# Patient Record
Sex: FEMALE | Race: WHITE | NOT HISPANIC OR LATINO | Employment: FULL TIME | URBAN - METROPOLITAN AREA
[De-identification: names, ages, dates, MRNs, and addresses within clinical notes are randomized per-mention and may not be internally consistent; named-entity substitution may affect disease eponyms.]

---

## 2017-02-24 PROBLEM — Z3A.39 39 WEEKS GESTATION OF PREGNANCY: Status: ACTIVE | Noted: 2017-02-24

## 2018-02-05 PROBLEM — Z3A.39 39 WEEKS GESTATION OF PREGNANCY: Status: RESOLVED | Noted: 2017-02-24 | Resolved: 2018-02-05

## 2018-04-20 PROBLEM — R10.2 PELVIC PAIN IN FEMALE: Status: ACTIVE | Noted: 2018-04-20

## 2018-06-05 PROBLEM — R06.4 HYPERVENTILATION: Status: ACTIVE | Noted: 2018-06-05

## 2018-10-05 ENCOUNTER — OFFICE VISIT (OUTPATIENT)
Dept: URGENT CARE | Facility: CLINIC | Age: 23
End: 2018-10-05
Payer: MEDICAID

## 2018-10-05 DIAGNOSIS — Z01.89 ENCOUNTER FOR LABORATORY TEST: Primary | ICD-10-CM

## 2018-10-05 PROCEDURE — 99000 SPECIMEN HANDLING OFFICE-LAB: CPT | Mod: S$GLB,,, | Performed by: PHYSICIAN ASSISTANT

## 2018-10-05 PROCEDURE — 36415 COLL VENOUS BLD VENIPUNCTURE: CPT | Mod: S$GLB,,, | Performed by: PHYSICIAN ASSISTANT

## 2018-10-05 PROCEDURE — 99204 OFFICE O/P NEW MOD 45 MIN: CPT | Mod: S$GLB,,, | Performed by: PHYSICIAN ASSISTANT

## 2018-10-05 NOTE — LETTER
October 5, 2018      [facility] [place]  [address], [address]  [place] [address]  Phone: [phone]  Fax: [phone]       Patient: Tess Rodríguez   YOB: 1995  Date of Visit: 10/05/2018    To Whom It May Concern:    Katelynn Rodríguez  was at [facility] on 10/05/2018. She may return to work/school on 10/05/2018 with no restrictions. If you have any questions or concerns, or if I can be of further assistance, please do not hesitate to contact me.    Sincerely,    RAFAT Osorio

## 2018-10-06 LAB — VZV IGG SER IA-ACNC: 1436 INDEX

## 2018-10-08 ENCOUNTER — TELEPHONE (OUTPATIENT)
Dept: URGENT CARE | Facility: CLINIC | Age: 23
End: 2018-10-08

## 2018-10-08 NOTE — TELEPHONE ENCOUNTER
Patient called about lab results from DOS 10/05.  Patient was given results, per Dr. Noonan, and stated will come and  a copy of results.

## 2020-01-30 PROBLEM — R10.13 ABDOMINAL PAIN, EPIGASTRIC: Status: ACTIVE | Noted: 2020-01-30

## 2020-04-19 PROBLEM — R10.31 RIGHT LOWER QUADRANT ABDOMINAL PAIN: Status: ACTIVE | Noted: 2020-04-19

## 2020-06-27 PROBLEM — R10.9 ABDOMINAL PAIN AFFECTING PREGNANCY: Status: ACTIVE | Noted: 2020-06-27

## 2020-06-27 PROBLEM — O26.899 ABDOMINAL PAIN AFFECTING PREGNANCY: Status: ACTIVE | Noted: 2020-06-27

## 2020-08-01 PROBLEM — O21.9 VOMITING AFFECTING PREGNANCY, ANTEPARTUM: Status: ACTIVE | Noted: 2020-08-01

## 2020-09-20 PROBLEM — O47.9 UTERINE CONTRACTIONS DURING PREGNANCY: Status: ACTIVE | Noted: 2020-09-20

## 2020-09-30 PROBLEM — Z3A.39 39 WEEKS GESTATION OF PREGNANCY: Status: ACTIVE | Noted: 2020-09-30

## 2021-07-27 PROBLEM — N94.4 PRIMARY DYSMENORRHEA: Status: ACTIVE | Noted: 2021-07-27

## 2021-09-13 PROBLEM — Z3A.39 39 WEEKS GESTATION OF PREGNANCY: Status: RESOLVED | Noted: 2020-09-30 | Resolved: 2021-09-13

## 2021-10-06 ENCOUNTER — OFFICE VISIT (OUTPATIENT)
Dept: URGENT CARE | Facility: CLINIC | Age: 26
End: 2021-10-06
Payer: MEDICAID

## 2021-10-06 VITALS
BODY MASS INDEX: 33.99 KG/M2 | DIASTOLIC BLOOD PRESSURE: 81 MMHG | OXYGEN SATURATION: 99 % | WEIGHT: 204 LBS | TEMPERATURE: 98 F | HEART RATE: 75 BPM | HEIGHT: 65 IN | SYSTOLIC BLOOD PRESSURE: 121 MMHG | RESPIRATION RATE: 16 BRPM

## 2021-10-06 DIAGNOSIS — Z11.59 SCREENING FOR VIRAL DISEASE: ICD-10-CM

## 2021-10-06 DIAGNOSIS — K52.9 GASTROENTERITIS: Primary | ICD-10-CM

## 2021-10-06 LAB
CTP QC/QA: YES
CTP QC/QA: YES
POC MOLECULAR INFLUENZA A AGN: NEGATIVE
POC MOLECULAR INFLUENZA B AGN: NEGATIVE
SARS-COV-2 RDRP RESP QL NAA+PROBE: NEGATIVE

## 2021-10-06 PROCEDURE — 87635 SARS-COV-2 COVID-19 AMP PRB: CPT | Mod: QW,S$GLB,, | Performed by: NURSE PRACTITIONER

## 2021-10-06 PROCEDURE — 87502 POCT INFLUENZA A/B MOLECULAR: ICD-10-PCS | Mod: QW,S$GLB,, | Performed by: NURSE PRACTITIONER

## 2021-10-06 PROCEDURE — 87502 INFLUENZA DNA AMP PROBE: CPT | Mod: QW,S$GLB,, | Performed by: NURSE PRACTITIONER

## 2021-10-06 PROCEDURE — 99214 OFFICE O/P EST MOD 30 MIN: CPT | Mod: S$GLB,CS,, | Performed by: NURSE PRACTITIONER

## 2021-10-06 PROCEDURE — 99214 PR OFFICE/OUTPT VISIT, EST, LEVL IV, 30-39 MIN: ICD-10-PCS | Mod: S$GLB,CS,, | Performed by: NURSE PRACTITIONER

## 2021-10-06 PROCEDURE — 87635: ICD-10-PCS | Mod: QW,S$GLB,, | Performed by: NURSE PRACTITIONER

## 2021-10-06 RX ORDER — DICYCLOMINE HYDROCHLORIDE 10 MG/1
10 CAPSULE ORAL 3 TIMES DAILY PRN
Qty: 12 CAPSULE | Refills: 0 | Status: SHIPPED | OUTPATIENT
Start: 2021-10-06 | End: 2021-12-13

## 2021-10-06 RX ORDER — ONDANSETRON 8 MG/1
8 TABLET, ORALLY DISINTEGRATING ORAL EVERY 8 HOURS PRN
Qty: 6 TABLET | Refills: 0 | Status: SHIPPED | OUTPATIENT
Start: 2021-10-06 | End: 2021-10-09

## 2021-10-06 RX ORDER — BUPROPION HYDROCHLORIDE 75 MG/1
75 TABLET ORAL 2 TIMES DAILY
COMMUNITY
End: 2021-12-13

## 2022-03-23 ENCOUNTER — OFFICE VISIT (OUTPATIENT)
Dept: URGENT CARE | Facility: CLINIC | Age: 27
End: 2022-03-23
Payer: MEDICAID

## 2022-03-23 VITALS
OXYGEN SATURATION: 98 % | BODY MASS INDEX: 32.15 KG/M2 | DIASTOLIC BLOOD PRESSURE: 87 MMHG | WEIGHT: 193 LBS | SYSTOLIC BLOOD PRESSURE: 124 MMHG | HEIGHT: 65 IN | TEMPERATURE: 98 F | HEART RATE: 100 BPM

## 2022-03-23 DIAGNOSIS — K52.9 GASTROENTERITIS: Primary | ICD-10-CM

## 2022-03-23 DIAGNOSIS — Z11.59 SCREENING FOR VIRAL DISEASE: ICD-10-CM

## 2022-03-23 DIAGNOSIS — R11.2 NAUSEA AND VOMITING, INTRACTABILITY OF VOMITING NOT SPECIFIED, UNSPECIFIED VOMITING TYPE: ICD-10-CM

## 2022-03-23 LAB
CTP QC/QA: YES
POC MOLECULAR INFLUENZA A AGN: NEGATIVE
POC MOLECULAR INFLUENZA B AGN: NEGATIVE

## 2022-03-23 PROCEDURE — 3008F PR BODY MASS INDEX (BMI) DOCUMENTED: ICD-10-PCS | Mod: CPTII,S$GLB,, | Performed by: NURSE PRACTITIONER

## 2022-03-23 PROCEDURE — 3074F PR MOST RECENT SYSTOLIC BLOOD PRESSURE < 130 MM HG: ICD-10-PCS | Mod: CPTII,S$GLB,, | Performed by: NURSE PRACTITIONER

## 2022-03-23 PROCEDURE — S0119 ONDANSETRON 4 MG: HCPCS | Mod: S$GLB,,, | Performed by: NURSE PRACTITIONER

## 2022-03-23 PROCEDURE — 1160F PR REVIEW ALL MEDS BY PRESCRIBER/CLIN PHARMACIST DOCUMENTED: ICD-10-PCS | Mod: CPTII,S$GLB,, | Performed by: NURSE PRACTITIONER

## 2022-03-23 PROCEDURE — 87502 INFLUENZA DNA AMP PROBE: CPT | Mod: QW,S$GLB,, | Performed by: NURSE PRACTITIONER

## 2022-03-23 PROCEDURE — 87502 POCT INFLUENZA A/B MOLECULAR: ICD-10-PCS | Mod: QW,S$GLB,, | Performed by: NURSE PRACTITIONER

## 2022-03-23 PROCEDURE — 3079F PR MOST RECENT DIASTOLIC BLOOD PRESSURE 80-89 MM HG: ICD-10-PCS | Mod: CPTII,S$GLB,, | Performed by: NURSE PRACTITIONER

## 2022-03-23 PROCEDURE — S0119 PR ONDANSETRON, ORAL, 4MG: ICD-10-PCS | Mod: S$GLB,,, | Performed by: NURSE PRACTITIONER

## 2022-03-23 PROCEDURE — 3079F DIAST BP 80-89 MM HG: CPT | Mod: CPTII,S$GLB,, | Performed by: NURSE PRACTITIONER

## 2022-03-23 PROCEDURE — 99214 OFFICE O/P EST MOD 30 MIN: CPT | Mod: S$GLB,,, | Performed by: NURSE PRACTITIONER

## 2022-03-23 PROCEDURE — 3074F SYST BP LT 130 MM HG: CPT | Mod: CPTII,S$GLB,, | Performed by: NURSE PRACTITIONER

## 2022-03-23 PROCEDURE — 1159F PR MEDICATION LIST DOCUMENTED IN MEDICAL RECORD: ICD-10-PCS | Mod: CPTII,S$GLB,, | Performed by: NURSE PRACTITIONER

## 2022-03-23 PROCEDURE — 1160F RVW MEDS BY RX/DR IN RCRD: CPT | Mod: CPTII,S$GLB,, | Performed by: NURSE PRACTITIONER

## 2022-03-23 PROCEDURE — 3008F BODY MASS INDEX DOCD: CPT | Mod: CPTII,S$GLB,, | Performed by: NURSE PRACTITIONER

## 2022-03-23 PROCEDURE — 1159F MED LIST DOCD IN RCRD: CPT | Mod: CPTII,S$GLB,, | Performed by: NURSE PRACTITIONER

## 2022-03-23 PROCEDURE — 99214 PR OFFICE/OUTPT VISIT, EST, LEVL IV, 30-39 MIN: ICD-10-PCS | Mod: S$GLB,,, | Performed by: NURSE PRACTITIONER

## 2022-03-23 RX ORDER — ONDANSETRON 8 MG/1
8 TABLET, ORALLY DISINTEGRATING ORAL
Status: COMPLETED | OUTPATIENT
Start: 2022-03-23 | End: 2022-03-23

## 2022-03-23 RX ORDER — DICYCLOMINE HYDROCHLORIDE 10 MG/1
10 CAPSULE ORAL
Qty: 16 CAPSULE | Refills: 0 | Status: SHIPPED | OUTPATIENT
Start: 2022-03-23 | End: 2022-03-27

## 2022-03-23 RX ORDER — ONDANSETRON 8 MG/1
8 TABLET, ORALLY DISINTEGRATING ORAL EVERY 8 HOURS PRN
Qty: 6 TABLET | Refills: 0 | Status: SHIPPED | OUTPATIENT
Start: 2022-03-23 | End: 2022-03-26

## 2022-03-23 RX ADMIN — ONDANSETRON 8 MG: 8 TABLET, ORALLY DISINTEGRATING ORAL at 04:03

## 2022-03-23 NOTE — LETTER
March 23, 2022      [facility]  [address], [address]  [place] [address]  Phone: [phone]  Fax: [phone]       Patient: Tess Rodríguez   YOB: 1995  Date of Visit: 03/23/2022    To Whom It May Concern:    Katelynn Rodríguez  was at [facility] on 03/23/2022. She may return to work/school on 03/25/2022 with no restrictions. If you have any questions or concerns, or if I can be of further assistance, please do not hesitate to contact me.    Sincerely,      Christine Magallon, NP

## 2022-03-23 NOTE — PATIENT INSTRUCTIONS
.1.  Take all medications as directed (only as needed for your symptoms).  2.  Rest and keep yourself/patient well hydrated. Start with small sips every 15 minutes and increase as tolerated. Use Gatorade/Pedialyte/Coconut water or rehydration packets to help stay hydrated.  Vitamin water and plain water do not contain rehydrating electrolytes.  Hold off on solids for 12-18 hours then advance to a BRAT/bland diet for the next several days. Increase as tolerated. Avoid all spicy, greasy, and acidic foods as these will make your symptoms worse. If you are unable to tolerate anything by mouth even after taking prescription meds and following the above instructions, you will likely need to go to the ER for IV fluids.  3. Perform good handwashing and Clorox/Lysol all surfaces well (especially bathrooms) to prevent spread of infection.   4.  For patients above 6 months of age who are not allergic to and are not on anticoagulants, you can alternate Tylenol and Motrin every 4-6 hours for fever above 100.4F and/or pain.  For patients less than 6 months of age, allergic to or intolerant to NSAIDS, have gastritis, gastric ulcers, or history of GI bleeds, are pregnant, or are on anticoagulant therapy, you can take Tylenol every 4 hours as needed for fever above 100.4F and/or pain.   5. You should schedule a follow-up appointment with your Primary Care Provider/Pediatrician for recheck in 2-3 days or as directed at this visit.   6.  If your condition fails to improve in a timely manner, you should receive another evaluation by your Primary Care Provider/Pediatrician to discuss your concerns or return to urgent care for a recheck.  If your condition worsens at any time, you should report immediately to your nearest Emergency Department for further evaluation. **You must understand that you have received Urgent Care treatment only and that you may be released before all of your medical problems are known or treated. You, the  patient, are responsible to arrange for follow-up care as instructed.

## 2022-03-23 NOTE — PROGRESS NOTES
"Subjective:       Patient ID: Tess Rodríguez is a 27 y.o. female.    Vitals:  height is 5' 5" (1.651 m) and weight is 87.5 kg (193 lb). Her tympanic temperature is 98.3 °F (36.8 °C). Her blood pressure is 124/87 and her pulse is 100. Her oxygen saturation is 98%.     Chief Complaint: Emesis (Patient stated she came back from lunch and started feeling bad.)    Pt reports symptoms started today after eating [employer] for lunch. She also states her daughter vomited this morning. She request a flu swab.     Emesis   This is a new problem. The current episode started today. The problem occurs 5 to 10 times per day. The problem has been gradually worsening. The emesis has an appearance of bile and stomach contents. There has been no fever. Associated symptoms include abdominal pain and diarrhea. She has tried nothing for the symptoms. The treatment provided no relief.       Constitution: Negative.   Neck: neck negative.   Cardiovascular: Negative.    Respiratory: Negative.    Gastrointestinal: Positive for abdominal pain, vomiting and diarrhea. Negative for abdominal trauma, abdominal bloating, history of abdominal surgery, nausea and constipation.       Objective:      Physical Exam   Constitutional: She is oriented to person, place, and time. She appears well-developed.  Non-toxic appearance. She does not appear ill. No distress.   HENT:   Head: Normocephalic and atraumatic.   Ears:   Right Ear: External ear normal.   Left Ear: External ear normal.   Nose: Nose normal.   Mouth/Throat: Mucous membranes are normal.   Eyes: Conjunctivae and lids are normal.   Neck: Trachea normal. Neck supple.   Cardiovascular: Normal rate, regular rhythm and normal heart sounds.   Pulmonary/Chest: Effort normal and breath sounds normal. No respiratory distress.   Abdominal: Normal appearance and bowel sounds are normal. She exhibits no distension, no abdominal bruit, no pulsatile midline mass and no mass. Soft. There is no abdominal " tenderness. There is no rebound, no guarding, no left CVA tenderness and no right CVA tenderness.   Musculoskeletal: Normal range of motion.         General: Normal range of motion.   Neurological: She is alert and oriented to person, place, and time. She has normal strength.   Skin: Skin is warm, dry, intact, not diaphoretic and not pale.   Psychiatric: Her speech is normal and behavior is normal. Judgment and thought content normal.   Nursing note and vitals reviewed.        Assessment:       1. Gastroenteritis    2. Nausea and vomiting, intractability of vomiting not specified, unspecified vomiting type    3. Screening for viral disease          Plan:         Gastroenteritis  -     dicyclomine (BENTYL) 10 MG capsule; Take 1 capsule (10 mg total) by mouth 4 (four) times daily before meals and nightly. for 4 days  Dispense: 16 capsule; Refill: 0  -     ondansetron (ZOFRAN-ODT) 8 MG TbDL; Take 1 tablet (8 mg total) by mouth every 8 (eight) hours as needed (nausea and vomiting).  Dispense: 6 tablet; Refill: 0    Nausea and vomiting, intractability of vomiting not specified, unspecified vomiting type  -     ondansetron disintegrating tablet 8 mg  -     dicyclomine (BENTYL) 10 MG capsule; Take 1 capsule (10 mg total) by mouth 4 (four) times daily before meals and nightly. for 4 days  Dispense: 16 capsule; Refill: 0  -     ondansetron (ZOFRAN-ODT) 8 MG TbDL; Take 1 tablet (8 mg total) by mouth every 8 (eight) hours as needed (nausea and vomiting).  Dispense: 6 tablet; Refill: 0    Screening for viral disease  -     POCT Influenza A/B MOLECULAR      Results for orders placed or performed in visit on 03/23/22   POCT Influenza A/B MOLECULAR   Result Value Ref Range    POC Molecular Influenza A Ag Negative Negative, Not Reported    POC Molecular Influenza B Ag Negative Negative, Not Reported     Acceptable Yes            Patient Instructions   .1.  Take all medications as directed (only as needed for your  symptoms).  2.  Rest and keep yourself/patient well hydrated. Start with small sips every 15 minutes and increase as tolerated. Use Gatorade/Pedialyte/Coconut water or rehydration packets to help stay hydrated.  Vitamin water and plain water do not contain rehydrating electrolytes.  Hold off on solids for 12-18 hours then advance to a BRAT/bland diet for the next several days. Increase as tolerated. Avoid all spicy, greasy, and acidic foods as these will make your symptoms worse. If you are unable to tolerate anything by mouth even after taking prescription meds and following the above instructions, you will likely need to go to the ER for IV fluids.  3. Perform good handwashing and Clorox/Lysol all surfaces well (especially bathrooms) to prevent spread of infection.   4.  For patients above 6 months of age who are not allergic to and are not on anticoagulants, you can alternate Tylenol and Motrin every 4-6 hours for fever above 100.4F and/or pain.  For patients less than 6 months of age, allergic to or intolerant to NSAIDS, have gastritis, gastric ulcers, or history of GI bleeds, are pregnant, or are on anticoagulant therapy, you can take Tylenol every 4 hours as needed for fever above 100.4F and/or pain.   5. You should schedule a follow-up appointment with your Primary Care Provider/Pediatrician for recheck in 2-3 days or as directed at this visit.   6.  If your condition fails to improve in a timely manner, you should receive another evaluation by your Primary Care Provider/Pediatrician to discuss your concerns or return to urgent care for a recheck.  If your condition worsens at any time, you should report immediately to your nearest Emergency Department for further evaluation. **You must understand that you have received Urgent Care treatment only and that you may be released before all of your medical problems are known or treated. You, the patient, are responsible to arrange for follow-up care as instructed.

## 2022-11-04 ENCOUNTER — TELEPHONE (OUTPATIENT)
Dept: GASTROENTEROLOGY | Facility: CLINIC | Age: 27
End: 2022-11-04
Payer: MEDICAID

## 2022-12-28 ENCOUNTER — TELEPHONE (OUTPATIENT)
Dept: GASTROENTEROLOGY | Facility: CLINIC | Age: 27
End: 2022-12-28
Payer: MEDICAID

## 2022-12-28 NOTE — TELEPHONE ENCOUNTER
Spoke with staff of Dr Delatorre's office explain that dr tamayo  is doesn't see pt for Gastris pacemakers or jejunostomy. It would best to refer pt to Dr Durán in general surgery.

## 2022-12-29 ENCOUNTER — TELEPHONE (OUTPATIENT)
Dept: GASTROENTEROLOGY | Facility: CLINIC | Age: 27
End: 2022-12-29

## 2022-12-29 NOTE — TELEPHONE ENCOUNTER
----- Message from Sandra Velez sent at 12/29/2022  2:03 PM CST -----  Regarding: Referral  Good Evening,      Dr. Rene would like to refer the following patient to  in the Gastro department. The patients diagnosis is possible pacemaker vs jejunotomy. I have scanned the patients referral and records into .     Thank you,   Sandra Rivera

## 2023-01-17 ENCOUNTER — OFFICE VISIT (OUTPATIENT)
Dept: URGENT CARE | Facility: CLINIC | Age: 28
End: 2023-01-17
Payer: MEDICAID

## 2023-01-17 VITALS
OXYGEN SATURATION: 98 % | HEIGHT: 65 IN | WEIGHT: 168 LBS | SYSTOLIC BLOOD PRESSURE: 99 MMHG | BODY MASS INDEX: 27.99 KG/M2 | HEART RATE: 59 BPM | DIASTOLIC BLOOD PRESSURE: 67 MMHG | TEMPERATURE: 98 F | RESPIRATION RATE: 18 BRPM

## 2023-01-17 DIAGNOSIS — R68.89 FLU-LIKE SYMPTOMS: Primary | ICD-10-CM

## 2023-01-17 DIAGNOSIS — J01.00 ACUTE NON-RECURRENT MAXILLARY SINUSITIS: ICD-10-CM

## 2023-01-17 LAB
CTP QC/QA: YES
POC MOLECULAR INFLUENZA A AGN: NEGATIVE
POC MOLECULAR INFLUENZA B AGN: NEGATIVE

## 2023-01-17 PROCEDURE — 99214 OFFICE O/P EST MOD 30 MIN: CPT | Mod: S$GLB,,, | Performed by: PHYSICIAN ASSISTANT

## 2023-01-17 PROCEDURE — 3078F DIAST BP <80 MM HG: CPT | Mod: CPTII,S$GLB,, | Performed by: PHYSICIAN ASSISTANT

## 2023-01-17 PROCEDURE — 1160F RVW MEDS BY RX/DR IN RCRD: CPT | Mod: CPTII,S$GLB,, | Performed by: PHYSICIAN ASSISTANT

## 2023-01-17 PROCEDURE — 1159F PR MEDICATION LIST DOCUMENTED IN MEDICAL RECORD: ICD-10-PCS | Mod: CPTII,S$GLB,, | Performed by: PHYSICIAN ASSISTANT

## 2023-01-17 PROCEDURE — 1160F PR REVIEW ALL MEDS BY PRESCRIBER/CLIN PHARMACIST DOCUMENTED: ICD-10-PCS | Mod: CPTII,S$GLB,, | Performed by: PHYSICIAN ASSISTANT

## 2023-01-17 PROCEDURE — 3074F PR MOST RECENT SYSTOLIC BLOOD PRESSURE < 130 MM HG: ICD-10-PCS | Mod: CPTII,S$GLB,, | Performed by: PHYSICIAN ASSISTANT

## 2023-01-17 PROCEDURE — 99214 PR OFFICE/OUTPT VISIT, EST, LEVL IV, 30-39 MIN: ICD-10-PCS | Mod: S$GLB,,, | Performed by: PHYSICIAN ASSISTANT

## 2023-01-17 PROCEDURE — 3008F BODY MASS INDEX DOCD: CPT | Mod: CPTII,S$GLB,, | Performed by: PHYSICIAN ASSISTANT

## 2023-01-17 PROCEDURE — 87502 INFLUENZA DNA AMP PROBE: CPT | Mod: QW,S$GLB,, | Performed by: PHYSICIAN ASSISTANT

## 2023-01-17 PROCEDURE — 3078F PR MOST RECENT DIASTOLIC BLOOD PRESSURE < 80 MM HG: ICD-10-PCS | Mod: CPTII,S$GLB,, | Performed by: PHYSICIAN ASSISTANT

## 2023-01-17 PROCEDURE — 87502 POCT INFLUENZA A/B MOLECULAR: ICD-10-PCS | Mod: QW,S$GLB,, | Performed by: PHYSICIAN ASSISTANT

## 2023-01-17 PROCEDURE — 1159F MED LIST DOCD IN RCRD: CPT | Mod: CPTII,S$GLB,, | Performed by: PHYSICIAN ASSISTANT

## 2023-01-17 PROCEDURE — 3008F PR BODY MASS INDEX (BMI) DOCUMENTED: ICD-10-PCS | Mod: CPTII,S$GLB,, | Performed by: PHYSICIAN ASSISTANT

## 2023-01-17 PROCEDURE — 3074F SYST BP LT 130 MM HG: CPT | Mod: CPTII,S$GLB,, | Performed by: PHYSICIAN ASSISTANT

## 2023-01-17 RX ORDER — FLUTICASONE PROPIONATE 50 MCG
1 SPRAY, SUSPENSION (ML) NASAL 2 TIMES DAILY PRN
Qty: 15 G | Refills: 0 | Status: SHIPPED | OUTPATIENT
Start: 2023-01-17

## 2023-01-17 RX ORDER — CETIRIZINE HYDROCHLORIDE 10 MG/1
10 TABLET ORAL DAILY
Qty: 30 TABLET | Refills: 0 | Status: SHIPPED | OUTPATIENT
Start: 2023-01-17 | End: 2024-01-17

## 2023-01-17 RX ORDER — DEXTROMETHORPHAN HYDROBROMIDE, GUAIFENESIN, PHENYLEPHRINE HYDROCHLORIDE 17.5; 400; 1 MG/1; MG/1; MG/1
1 TABLET ORAL
Qty: 20 TABLET | Refills: 0 | Status: ON HOLD | OUTPATIENT
Start: 2023-01-17 | End: 2023-05-19

## 2023-01-17 RX ORDER — AMOXICILLIN AND CLAVULANATE POTASSIUM 875; 125 MG/1; MG/1
1 TABLET, FILM COATED ORAL EVERY 12 HOURS
Qty: 14 TABLET | Refills: 0 | Status: SHIPPED | OUTPATIENT
Start: 2023-01-17 | End: 2023-01-24

## 2023-01-17 NOTE — PROGRESS NOTES
"Subjective:       Patient ID: Tess Rodríguez is a 27 y.o. female.    Vitals:  height is 5' 5" (1.651 m) and weight is 76.2 kg (168 lb). Her oral temperature is 97.7 °F (36.5 °C). Her blood pressure is 99/67 and her pulse is 59 (abnormal). Her respiration is 18 and oxygen saturation is 98%.     Chief Complaint: Sore Throat    Sore Throat   This is a new problem. The current episode started in the past 7 days. The problem has been gradually worsening. There has been no fever. Associated symptoms include congestion, ear pain, headaches, swollen glands and trouble swallowing. Pertinent negatives include no coughing or diarrhea. She has had no exposure to mono. She has tried acetaminophen (chloriseptic) for the symptoms.     HENT:  Positive for ear pain, congestion, sore throat and trouble swallowing.    Respiratory:  Negative for cough.    Gastrointestinal:  Negative for diarrhea.   Neurological:  Positive for headaches.     Objective:      Physical Exam   Constitutional: She is oriented to person, place, and time. She appears well-developed. She is cooperative.  Non-toxic appearance. She does not appear ill. No distress.   HENT:   Head: Normocephalic and atraumatic.   Ears:   Right Ear: Hearing, external ear and ear canal normal. Tympanic membrane is not erythematous, not retracted and not bulging. No middle ear effusion. impacted cerumen  Left Ear: Hearing, external ear and ear canal normal. Tympanic membrane is bulging. Tympanic membrane is not erythematous and not retracted. A middle ear effusion is present. impacted cerumen  Nose: No rhinorrhea or congestion. Right sinus exhibits maxillary sinus tenderness. Right sinus exhibits no frontal sinus tenderness. Left sinus exhibits maxillary sinus tenderness. Left sinus exhibits no frontal sinus tenderness.   Mouth/Throat: Mucous membranes are moist. No oropharyngeal exudate or posterior oropharyngeal erythema. Oropharynx is clear.   Dull left TM      Comments: Dull " left TM  Eyes: Conjunctivae and lids are normal. No scleral icterus.   Neck: Phonation normal. Neck supple.   Cardiovascular: Normal rate, regular rhythm and normal heart sounds.   No murmur heard.Exam reveals no gallop and no friction rub.   Pulmonary/Chest: Effort normal and breath sounds normal. No stridor. No respiratory distress. She has no decreased breath sounds. She has no wheezes. She has no rhonchi. She has no rales.   Abdominal: Normal appearance.   Neurological: She is alert and oriented to person, place, and time. Coordination normal.   Skin: Skin is intact, not diaphoretic and not pale.   Psychiatric: Her speech is normal and behavior is normal. Judgment and thought content normal.   Nursing note and vitals reviewed.      Assessment:       1. Flu-like symptoms    2. Acute non-recurrent maxillary sinusitis          Plan:         Flu-like symptoms  -     POCT Influenza A/B MOLECULAR    Acute non-recurrent maxillary sinusitis  -     fluticasone propionate (FLONASE) 50 mcg/actuation nasal spray; 1 spray (50 mcg total) by Each Nostril route 2 (two) times daily as needed.  Dispense: 15 g; Refill: 0  -     phenylephrine-DM-guaiFENesin (DECONEX DMX) 10-17.5-400 mg Tab; Take 1 tablet by mouth every 4 to 6 hours as needed (congestion/cough).  Dispense: 20 tablet; Refill: 0  -     cetirizine (ZYRTEC) 10 MG tablet; Take 1 tablet (10 mg total) by mouth once daily.  Dispense: 30 tablet; Refill: 0  -     amoxicillin-clavulanate 875-125mg (AUGMENTIN) 875-125 mg per tablet; Take 1 tablet by mouth every 12 (twelve) hours. for 7 days  Dispense: 14 tablet; Refill: 0      Results for orders placed or performed in visit on 01/17/23   POCT Influenza A/B MOLECULAR   Result Value Ref Range    POC Molecular Influenza A Ag Negative Negative, Not Reported    POC Molecular Influenza B Ag Negative Negative, Not Reported     Acceptable Yes           I have reviewed the patients previous visits, labs and images to look  for any trends or previous treatments.  Alternate ibuprofen and tylenol as needed for fever/pain/body aches every 4-6 hours. Rest, increase PO fluids.   Discussed with patient the importance of f/u with their primary care provider. Urged to go to the ER for any worsening signs or symptoms.

## 2023-02-07 ENCOUNTER — TELEPHONE (OUTPATIENT)
Dept: GASTROENTEROLOGY | Facility: CLINIC | Age: 28
End: 2023-02-07
Payer: MEDICAID

## 2023-02-07 ENCOUNTER — HOSPITAL ENCOUNTER (EMERGENCY)
Facility: HOSPITAL | Age: 28
Discharge: HOME | End: 2023-02-07
Attending: EMERGENCY MEDICINE
Payer: MEDICAID

## 2023-02-07 VITALS
BODY MASS INDEX: 25.49 KG/M2 | TEMPERATURE: 99 F | OXYGEN SATURATION: 98 % | WEIGHT: 153 LBS | HEART RATE: 86 BPM | HEIGHT: 65 IN | RESPIRATION RATE: 18 BRPM | SYSTOLIC BLOOD PRESSURE: 118 MMHG | DIASTOLIC BLOOD PRESSURE: 78 MMHG

## 2023-02-07 DIAGNOSIS — K31.84 GASTROPARESIS: Primary | ICD-10-CM

## 2023-02-07 LAB
ALBUMIN SERPL BCP-MCNC: 4.5 G/DL (ref 3.5–5.2)
ALP SERPL-CCNC: 44 U/L (ref 55–135)
ALT SERPL W/O P-5'-P-CCNC: 9 U/L (ref 10–44)
ANION GAP SERPL CALC-SCNC: 9 MMOL/L (ref 8–16)
AST SERPL-CCNC: 13 U/L (ref 10–40)
BASOPHILS # BLD AUTO: 0.02 K/UL (ref 0–0.2)
BASOPHILS NFR BLD: 0.4 % (ref 0–1.9)
BILIRUB SERPL-MCNC: 0.5 MG/DL (ref 0.1–1)
BILIRUB UR QL STRIP: NEGATIVE
BUN SERPL-MCNC: 7 MG/DL (ref 6–30)
BUN SERPL-MCNC: 8 MG/DL (ref 6–20)
CALCIUM SERPL-MCNC: 9.1 MG/DL (ref 8.7–10.5)
CHLORIDE SERPL-SCNC: 106 MMOL/L (ref 95–110)
CHLORIDE SERPL-SCNC: 110 MMOL/L (ref 95–110)
CLARITY UR REFRACT.AUTO: CLEAR
CO2 SERPL-SCNC: 22 MMOL/L (ref 23–29)
COLOR UR AUTO: YELLOW
CREAT SERPL-MCNC: 0.6 MG/DL (ref 0.5–1.4)
CREAT SERPL-MCNC: 0.8 MG/DL (ref 0.5–1.4)
DIFFERENTIAL METHOD: NORMAL
EOSINOPHIL # BLD AUTO: 0.1 K/UL (ref 0–0.5)
EOSINOPHIL NFR BLD: 0.9 % (ref 0–8)
ERYTHROCYTE [DISTWIDTH] IN BLOOD BY AUTOMATED COUNT: 13.2 % (ref 11.5–14.5)
EST. GFR  (NO RACE VARIABLE): >60 ML/MIN/1.73 M^2
GLUCOSE SERPL-MCNC: 94 MG/DL (ref 70–110)
GLUCOSE SERPL-MCNC: 95 MG/DL (ref 70–110)
GLUCOSE UR QL STRIP: NEGATIVE
HCT VFR BLD AUTO: 37.8 % (ref 37–48.5)
HCT VFR BLD CALC: 40 %PCV (ref 36–54)
HCV AB SERPL QL IA: NORMAL
HGB BLD-MCNC: 12.8 G/DL (ref 12–16)
HGB UR QL STRIP: NEGATIVE
HIV 1+2 AB+HIV1 P24 AG SERPL QL IA: NORMAL
IMM GRANULOCYTES # BLD AUTO: 0.01 K/UL (ref 0–0.04)
IMM GRANULOCYTES NFR BLD AUTO: 0.2 % (ref 0–0.5)
KETONES UR QL STRIP: NEGATIVE
LEUKOCYTE ESTERASE UR QL STRIP: NEGATIVE
LIPASE SERPL-CCNC: 14 U/L (ref 4–60)
LYMPHOCYTES # BLD AUTO: 1.2 K/UL (ref 1–4.8)
LYMPHOCYTES NFR BLD: 20.8 % (ref 18–48)
MAGNESIUM SERPL-MCNC: 2.1 MG/DL (ref 1.6–2.6)
MCH RBC QN AUTO: 30.4 PG (ref 27–31)
MCHC RBC AUTO-ENTMCNC: 33.9 G/DL (ref 32–36)
MCV RBC AUTO: 90 FL (ref 82–98)
MONOCYTES # BLD AUTO: 0.3 K/UL (ref 0.3–1)
MONOCYTES NFR BLD: 6.1 % (ref 4–15)
NEUTROPHILS # BLD AUTO: 4 K/UL (ref 1.8–7.7)
NEUTROPHILS NFR BLD: 71.6 % (ref 38–73)
NITRITE UR QL STRIP: NEGATIVE
NRBC BLD-RTO: 0 /100 WBC
PH UR STRIP: 7 [PH] (ref 5–8)
PLATELET # BLD AUTO: 194 K/UL (ref 150–450)
PMV BLD AUTO: 11.2 FL (ref 9.2–12.9)
POC IONIZED CALCIUM: 1.13 MMOL/L (ref 1.06–1.42)
POC TCO2 (MEASURED): 24 MMOL/L (ref 23–29)
POTASSIUM BLD-SCNC: 3.5 MMOL/L (ref 3.5–5.1)
POTASSIUM SERPL-SCNC: 3.5 MMOL/L (ref 3.5–5.1)
PROT SERPL-MCNC: 7.5 G/DL (ref 6–8.4)
PROT UR QL STRIP: NEGATIVE
RBC # BLD AUTO: 4.21 M/UL (ref 4–5.4)
SAMPLE: NORMAL
SODIUM BLD-SCNC: 142 MMOL/L (ref 136–145)
SODIUM SERPL-SCNC: 141 MMOL/L (ref 136–145)
SP GR UR STRIP: 1.01 (ref 1–1.03)
URN SPEC COLLECT METH UR: NORMAL
WBC # BLD AUTO: 5.57 K/UL (ref 3.9–12.7)

## 2023-02-07 PROCEDURE — 81003 URINALYSIS AUTO W/O SCOPE: CPT | Performed by: PHYSICIAN ASSISTANT

## 2023-02-07 PROCEDURE — 87389 HIV-1 AG W/HIV-1&-2 AB AG IA: CPT | Performed by: PHYSICIAN ASSISTANT

## 2023-02-07 PROCEDURE — 80053 COMPREHEN METABOLIC PANEL: CPT | Performed by: PHYSICIAN ASSISTANT

## 2023-02-07 PROCEDURE — 63600175 PHARM REV CODE 636 W HCPCS: Performed by: PHYSICIAN ASSISTANT

## 2023-02-07 PROCEDURE — 99284 PR EMERGENCY DEPT VISIT,LEVEL IV: ICD-10-PCS | Mod: ,,, | Performed by: PHYSICIAN ASSISTANT

## 2023-02-07 PROCEDURE — 83690 ASSAY OF LIPASE: CPT | Performed by: PHYSICIAN ASSISTANT

## 2023-02-07 PROCEDURE — 96374 THER/PROPH/DIAG INJ IV PUSH: CPT

## 2023-02-07 PROCEDURE — 99284 EMERGENCY DEPT VISIT MOD MDM: CPT | Mod: 25

## 2023-02-07 PROCEDURE — 86803 HEPATITIS C AB TEST: CPT | Performed by: PHYSICIAN ASSISTANT

## 2023-02-07 PROCEDURE — 80047 BASIC METABLC PNL IONIZED CA: CPT

## 2023-02-07 PROCEDURE — 83735 ASSAY OF MAGNESIUM: CPT | Performed by: PHYSICIAN ASSISTANT

## 2023-02-07 PROCEDURE — 96375 TX/PRO/DX INJ NEW DRUG ADDON: CPT

## 2023-02-07 PROCEDURE — 85025 COMPLETE CBC W/AUTO DIFF WBC: CPT | Performed by: PHYSICIAN ASSISTANT

## 2023-02-07 PROCEDURE — 99284 EMERGENCY DEPT VISIT MOD MDM: CPT | Mod: ,,, | Performed by: PHYSICIAN ASSISTANT

## 2023-02-07 RX ORDER — ONDANSETRON 2 MG/ML
4 INJECTION INTRAMUSCULAR; INTRAVENOUS
Status: COMPLETED | OUTPATIENT
Start: 2023-02-07 | End: 2023-02-07

## 2023-02-07 RX ORDER — DROPERIDOL 2.5 MG/ML
1.25 INJECTION, SOLUTION INTRAMUSCULAR; INTRAVENOUS ONCE
Status: COMPLETED | OUTPATIENT
Start: 2023-02-07 | End: 2023-02-07

## 2023-02-07 RX ORDER — METOCLOPRAMIDE 10 MG/1
10 TABLET ORAL EVERY 6 HOURS PRN
Qty: 12 TABLET | Refills: 0 | Status: SHIPPED | OUTPATIENT
Start: 2023-02-07

## 2023-02-07 RX ADMIN — DROPERIDOL 1.25 MG: 2.5 INJECTION, SOLUTION INTRAMUSCULAR; INTRAVENOUS at 12:02

## 2023-02-07 RX ADMIN — ONDANSETRON 4 MG: 2 INJECTION INTRAMUSCULAR; INTRAVENOUS at 11:02

## 2023-02-07 RX ADMIN — SODIUM CHLORIDE, POTASSIUM CHLORIDE, SODIUM LACTATE AND CALCIUM CHLORIDE 1000 ML: 600; 310; 30; 20 INJECTION, SOLUTION INTRAVENOUS at 12:02

## 2023-02-07 NOTE — ED TRIAGE NOTES
"Pt c/o "issues with gastroparesis".  Pt states she is having difficulty swallowing, sensation of something stuck in throat and vomiting.  Pt trying to see GI doctor but unable to get another referreal to new doctor here.   "

## 2023-02-07 NOTE — TELEPHONE ENCOUNTER
----- Message from Claudia Ochoa sent at 2/7/2023 12:08 PM CST -----  HILLLISA OBIE calling regarding Patient Advice (message) for #missed call from nurse but no name. call back [phone]

## 2023-02-07 NOTE — FIRST PROVIDER EVALUATION
" Emergency Department TeleTriage Encounter Note      CHIEF COMPLAINT    Chief Complaint   Patient presents with    Emesis     Hx gastroparesis, been vomiting since June, trouble swallowing throat burning       VITAL SIGNS   Initial Vitals [02/07/23 1014]   BP Pulse Resp Temp SpO2   (!) 144/109 110 18 98.6 °F (37 °C) 100 %      MAP       --            ALLERGIES    Review of patient's allergies indicates:   Allergen Reactions    Wellbutrin [bupropion hcl] Other (See Comments)     Suicidal thoughts       PROVIDER TRIAGE NOTE  Patient presents with complaint of nausea and vomiting for six days. She reports no change in bowel movements. She reports no fever. She denied urinary symptoms. States it is her "gastroparesis".       Phy:   Constitutional: well nourished, well developed, appearing stated age, NAD   HEENT: NCAT, symmetrical lids, No obvious facial deformity.  Normal phonation. Normal Conjunctiva   Neck: NAROM   Respiratory: Normal effort.  No obvious use of accessory muscles   Musculoskeletal: Moved upper extremities well   Neuro: Alert, answers questions appropriately    Psych: appropriate mood and affect      Initial orders will be placed and care will be transferred to an alternate provider when patient is roomed for a full evaluation. Any additional orders and the final disposition will be determined by that provider.        ORDERS  Labs Reviewed   HIV 1 / 2 ANTIBODY   HEPATITIS C ANTIBODY   CBC W/ AUTO DIFFERENTIAL   COMPREHENSIVE METABOLIC PANEL   LIPASE   URINALYSIS, REFLEX TO URINE CULTURE   MAGNESIUM   ISTAT CHEM8       ED Orders (720h ago, onward)      Start Ordered     Status Ordering Provider    02/07/23 1145 02/07/23 1139  sodium chloride 0.9% bolus 1,000 mL 1,000 mL  Once         Ordered MARIA EUGENIA MATHIAS    02/07/23 1145 02/07/23 1139  ondansetron injection 4 mg  ED 1 Time         Ordered MARIA EUGENIA MATHIAS    02/07/23 1141 02/07/23 1140  Magnesium  STAT         Ordered JUSTIN " ANDRY, MARIA EUGENIA    02/07/23 1140 02/07/23 1139  Vital signs  Every 2 hours         Ordered JUSTIN WILDE, MARIA EUGENIA    02/07/23 1140 02/07/23 1139  Diet NPO  Diet effective now         Ordered JUSTIN WILDE, MARIA EUGENIA    02/07/23 1140 02/07/23 1139  Insert peripheral IV  Once         Ordered JUSTIN WILDE, MARIA EUGENIA    02/07/23 1140 02/07/23 1139  CBC W/ AUTO DIFFERENTIAL  STAT         Ordered JUTSIN WILDE, MARIA EUGENIA    02/07/23 1140 02/07/23 1139  Comp. Metabolic Panel  STAT         Ordered JUSTIN WILDE, MARIA EUGENIA    02/07/23 1140 02/07/23 1139  ISTAT CHEM8  Once         Ordered JUSTIN WILDE, MARIA EUGENIA    02/07/23 1140 02/07/23 1139  Lipase  STAT         Ordered JUSTIN WILDE, MARIA EUGENIA    02/07/23 1140 02/07/23 1139  Urinalysis, Reflex to Urine Culture Urine, Clean Catch  STAT         Ordered MARIA EUGENIA MATHIAS    02/07/23 1016 02/07/23 1015  HIV 1/2 Ag/Ab (4th Gen)  STAT         Acknowledged VIRGINIA FLORES    02/07/23 1016 02/07/23 1015  Hepatitis C Antibody  STAT         Acknowledged VIRGINIA FLORES              Virtual Visit Note: The provider triage portion of this emergency department evaluation and documentation was performed via [employer], a HIPAA-compliant telemedicine application, in concert with a tele-presenter in the room. A face to face patient evaluation with one of my colleagues will occur once the patient is placed in an emergency department room.      DISCLAIMER: This note was prepared with [employer]stagg voice recognition transcription software. Garbled syntax, mangled pronouns, and other bizarre constructions may be attributed to that software system.

## 2023-02-07 NOTE — ED PROVIDER NOTES
Encounter Date: 2/7/2023       History     Chief Complaint   Patient presents with    Emesis     Hx gastroparesis, been vomiting since June, trouble swallowing throat burning     Patient is a 28-year-old female with a history of gastroparesis, endometriosis, anxiety who [place] [facility] [place] with abdominal pain, nausea, vomiting.    On chart review, patient has had intermittent abdominal pain since 2018.  She has had several CTs of her abdomen that never show any acute processes.  She recently had recent fluoroscopic upper GI test that showed:  Signs of delayed gastric emptying consistent with gastroparesis which she has already been diagnosed with.    Patient [place] [facility] for chronic symptoms and referral.  She states that she was told by her previous providers that she may need a feeding tube as well as gastric stimulator.    She endorses generalized abdominal pain with nausea, vomiting.  Her bowel movements have been normal.  She has several antiemetics at home that has not helped her.  She feels as if she is losing weight.  States sometimes she can only drank half of a water bottle.  She occasionally feels burning in her throat.    This is the extent of her medical complaints at this time.    She smokes marijuana but states that she started having intermittent abdominal pain prior to her THC use.      Review of patient's allergies indicates:   Allergen Reactions    Wellbutrin [bupropion hcl] Other (See Comments)     Suicidal thoughts     Past Medical History:   Diagnosis Date    Allergic sinusitis     Anxiety     Digestive disorder     ACID REFLUX    Dyspareunia due to medical condition in female     Endometriosis     Pelvic pain in female     Postpartum depression     Syncope     CHRONIC/RECURRING    UTI (urinary tract infection)     H/O     Past Surgical History:   Procedure Laterality Date    COLONOSCOPY N/A 1/30/2020    Procedure: COLONOSCOPY;  Surgeon: Jonathon Chisholm MD;  Location: [facility];   Service: Endoscopy;  Laterality: N/A;    epidural for childbirth      ESOPHAGOGASTRODUODENOSCOPY N/A 2020    Procedure: ESOPHAGOGASTRODUODENOSCOPY (EGD);  Surgeon: Jonathon Chisholm MD;  Location: [facility] ENDO;  Service: Endoscopy;  Laterality: N/A;    HYSTERECTOMY      IUD placement and removal      LAPAROSCOPIC CHOLECYSTECTOMY N/A 2021    Procedure: CHOLECYSTECTOMY, LAPAROSCOPIC;  Surgeon: Phillip Nguyen MD;  Location: [facility] OR;  Service: General;  Laterality: N/A;    LAPAROSCOPIC SALPINGECTOMY Bilateral 2021    Procedure: SALPINGECTOMY, LAPAROSCOPIC, bilateral;  Surgeon: Lev Giles Jr., MD;  Location: [facility];  Service: OB/GYN;  Laterality: Bilateral;    LAPAROSCOPIC TOTAL HYSTERECTOMY N/A 2021    Procedure: HYSTERECTOMY, TOTAL, LAPAROSCOPIC;  Surgeon: Lev Giles Jr., MD;  Location: [facility] OR;  Service: OB/GYN;  Laterality: N/A;  11:30 per Omari, negative     PELVIC LAPAROSCOPY      POSTPARTUM LIGATION OF FALLOPIAN TUBE Bilateral 2020    Procedure: LIGATION, FALLOPIAN TUBE, POSTPARTUM;  Surgeon: Lev Giles Jr., MD;  Location: [facility] OR;  Service: OB/GYN;  Laterality: Bilateral;    WISDOM TOOTH EXTRACTION       Family History   Problem Relation Age of Onset    Birth defects Sister     Diabetes Maternal Grandmother     Diabetes Paternal Grandfather     Stomach cancer Paternal Grandfather     No Known Problems Mother      Social History     Tobacco Use    Smoking status: Former     Types: Cigarettes     Quit date:      Years since quittin.1    Smokeless tobacco: Never   Substance Use Topics    Alcohol use: Not Currently     Comment: occasionally    Drug use: No     Review of Systems   Constitutional:  Positive for activity change, appetite change and unexpected weight change. Negative for chills and fever.   HENT:  Positive for sore throat.    Respiratory:  Negative for shortness of breath.    Cardiovascular:  Negative for chest pain.    Gastrointestinal:  Positive for abdominal pain, nausea and vomiting. Negative for constipation and diarrhea.   Genitourinary:  Negative for dysuria.   Musculoskeletal:  Negative for back pain.   Skin:  Negative for rash.   Neurological:  Negative for weakness.   Hematological:  Does not bruise/bleed easily.     Physical Exam     Initial Vitals [02/07/23 1014]   BP Pulse Resp Temp SpO2   (!) 144/109 110 18 98.6 °F (37 °C) 100 %      MAP       --         Physical Exam    Vitals reviewed.  Constitutional: She appears well-developed and well-nourished. She is not diaphoretic. She is cooperative.  Non-toxic appearance. She does not have a sickly appearance. She does not appear ill. No distress.   HENT:   Head: Normocephalic and atraumatic.   Nose: Nose normal.   Mouth/Throat: No trismus in the jaw.   Eyes: Conjunctivae and EOM are normal.   Neck:   Normal range of motion.  Pulmonary/Chest: No accessory muscle usage. No tachypnea. No respiratory distress.   Abdominal: Abdomen is soft. She exhibits no distension. There is no abdominal tenderness. There is no rebound and no guarding.   Musculoskeletal:         General: Normal range of motion.      Cervical back: Normal range of motion.     Neurological: She is alert. She has normal strength.   Skin: Skin is warm and dry. No erythema. No pallor.       ED Course   Procedures  Labs Reviewed   COMPREHENSIVE METABOLIC PANEL - Abnormal; Notable for the following components:       Result Value    CO2 22 (*)     Alkaline Phosphatase 44 (*)     ALT 9 (*)     All other components within normal limits   HIV 1 / 2 ANTIBODY    Narrative:     Release to patient->Immediate   HEPATITIS C ANTIBODY    Narrative:     Release to patient->Immediate   CBC W/ AUTO DIFFERENTIAL   LIPASE   URINALYSIS, REFLEX TO URINE CULTURE    Narrative:     Specimen Source->Urine   MAGNESIUM   ISTAT PROCEDURE          Imaging Results    None          Medications   ondansetron injection 4 mg (4 mg Intravenous  Given 2/7/23 1150)   droperidoL injection 1.25 mg (1.25 mg Intravenous Given 2/7/23 1244)   lactated ringers bolus 1,000 mL (1,000 mLs Intravenous New Bag 2/7/23 1242)     Medical Decision Making:   History:   Old Medical Records: I decided to obtain old medical records.  Old Records Summarized: records from another hospital and records from previous admission(s).  Initial Assessment:   Patient is a 28-year-old female with a history of gastroparesis, endometriosis, anxiety who [place] [facility] [place], nausea, vomiting.  Differential Diagnosis:   Includes but is not limited to gastroparesis, cyclical vomiting syndrome, hyperemesis cannabis syndrome, electrolyte abnormalities, dehydration, SHAUNA.  Patient presenting with chronic symptoms.  Her abdomen is soft, nontender nondistended.  Have considered but have a low suspicion for acute surgical abdomen, however will keep on differential if she does not improve.  Clinical Tests:   Lab Tests: Reviewed and Ordered  Radiological Study: Reviewed  ED Management:  Will initiate workup, give IV fluids and IV antiemetics (droperidol) for acute symptoms, and reassess.    CBC without leukocytosis or anemia.  CMP without significant electrolyte abnormalities.  No SHAUNA.  No signs of liver failure common bile duct obstruction, or pancreatitis.    Patient reassessed.  She reports significant improvement in her symptoms.  She was p.o. challenged successfully.  I have placed a referral to gastroenterology here for a gastric stimulator evaluation (she has made an appt at [facility]).  I do not feel that she needs a feeding tube at this time given that she is still able to tolerate orally and has not lost significant weight nor is malnourished.  Follow up accordingly.  All of her questions were answered.  Return to ED precautions were given Patient comfortable with plan and stable for discharge.                        Clinical Impression:   Final diagnoses:  [K31.84]  Gastroparesis (Primary)        ED Disposition Condition    Discharge Stable          ED Prescriptions       Medication Sig Dispense Start Date End Date Auth. Provider    metoclopramide HCl (REGLAN) 10 MG tablet Take 1 tablet (10 mg total) by mouth every 6 (six) hours as needed. 12 tablet 2/7/2023 -- Kathe Campbell PA-C          Follow-up Information       Follow up With Specialties Details Why Contact Info Additional Information    [facility] - [facility] 4th Fl Gastroenterology Schedule an appointment as soon as possible for a visit   [address] [facility] [id number] [phone] GI Center & Urology - Main Building, 4th Floor Please park in [facility] and take Atrium elevator    [facility] - Emergency Dept Emergency Medicine  If symptoms worsen [address] [facility] [id number] [phone]           Future Appointments   Date Time Provider Department Center   3/2/2023  8:00 AM SYBIL Chairez [facility] Leslie Campbell PA-C  02/08/23 1944

## 2023-02-07 NOTE — ED NOTES
I-STAT Chem-8+ Results:   Value Reference Range   Sodium 142 136-145 mmol/L   Potassium  3.5 3.5-5.1 mmol/L   Chloride 106  mmol/L   Ionized Calcium 1.13 1.06-1.42 mmol/L   CO2 (measured) 24 23-29 mmol/L   Glucose 95  mg/dL   BUN 7 6-30 mg/dL   Creatinine 0.6 0.5-1.4 mg/dL   Hematocrit 40 36-54%

## 2023-02-07 NOTE — DISCHARGE INSTRUCTIONS
Your labs are normal.  No signs of electrolyte abnormalities, dehydration, kidney injury, or liver failure.  Your pancreatic enzyme is normal.  He did not have signs of blood in her urine or urinary tract infection.  Take zofran or regland for your nausea every 6 hours as needed.   Start soft/fluid diet (bland foods) and advance to solids as tolerated.   Stay hydrated by drinking plenty of fluids (2 liters for females and 3 liters for males on a daily basis).  Follow up with primary care physician.   Return to the ER for new or worsening symptoms.

## 2023-02-28 ENCOUNTER — TELEPHONE (OUTPATIENT)
Dept: GASTROENTEROLOGY | Facility: CLINIC | Age: 28
End: 2023-02-28
Payer: MEDICAID

## 2023-02-28 NOTE — TELEPHONE ENCOUNTER
Called patient to inform her that we will have to cancel her appointment scheduled 03/02/2023 because the provider does not treat patient with gastroparesis.    ----- Message from SYBIL Chairez sent at 2/28/2023  8:45 AM CST -----  Patient needs to follow up with previous GI provider or [facility] as I do not manage gastroparesis.

## 2023-03-03 ENCOUNTER — TELEPHONE (OUTPATIENT)
Dept: SURGERY | Facility: CLINIC | Age: 28
End: 2023-03-03
Payer: MEDICAID

## 2023-03-06 ENCOUNTER — OFFICE VISIT (OUTPATIENT)
Dept: SURGERY | Facility: CLINIC | Age: 28
End: 2023-03-06
Payer: MEDICAID

## 2023-03-06 VITALS
WEIGHT: 151.06 LBS | OXYGEN SATURATION: 99 % | SYSTOLIC BLOOD PRESSURE: 126 MMHG | HEART RATE: 90 BPM | DIASTOLIC BLOOD PRESSURE: 87 MMHG | HEIGHT: 65 IN | BODY MASS INDEX: 25.17 KG/M2

## 2023-03-06 DIAGNOSIS — K31.84 GASTROPARESIS: ICD-10-CM

## 2023-03-06 PROCEDURE — 99202 OFFICE O/P NEW SF 15 MIN: CPT | Mod: S$PBB,,, | Performed by: SURGERY

## 2023-03-06 PROCEDURE — 99999 PR PBB SHADOW E&M-EST. PATIENT-LVL IV: ICD-10-PCS | Mod: PBBFAC,,, | Performed by: SURGERY

## 2023-03-06 PROCEDURE — 3079F PR MOST RECENT DIASTOLIC BLOOD PRESSURE 80-89 MM HG: ICD-10-PCS | Mod: CPTII,,, | Performed by: SURGERY

## 2023-03-06 PROCEDURE — 3074F SYST BP LT 130 MM HG: CPT | Mod: CPTII,,, | Performed by: SURGERY

## 2023-03-06 PROCEDURE — 99214 OFFICE O/P EST MOD 30 MIN: CPT | Mod: PBBFAC | Performed by: SURGERY

## 2023-03-06 PROCEDURE — 3008F BODY MASS INDEX DOCD: CPT | Mod: CPTII,,, | Performed by: SURGERY

## 2023-03-06 PROCEDURE — 1159F PR MEDICATION LIST DOCUMENTED IN MEDICAL RECORD: ICD-10-PCS | Mod: CPTII,,, | Performed by: SURGERY

## 2023-03-06 PROCEDURE — 3074F PR MOST RECENT SYSTOLIC BLOOD PRESSURE < 130 MM HG: ICD-10-PCS | Mod: CPTII,,, | Performed by: SURGERY

## 2023-03-06 PROCEDURE — 3079F DIAST BP 80-89 MM HG: CPT | Mod: CPTII,,, | Performed by: SURGERY

## 2023-03-06 PROCEDURE — 3008F PR BODY MASS INDEX (BMI) DOCUMENTED: ICD-10-PCS | Mod: CPTII,,, | Performed by: SURGERY

## 2023-03-06 PROCEDURE — 1159F MED LIST DOCD IN RCRD: CPT | Mod: CPTII,,, | Performed by: SURGERY

## 2023-03-06 PROCEDURE — 99999 PR PBB SHADOW E&M-EST. PATIENT-LVL IV: CPT | Mod: PBBFAC,,, | Performed by: SURGERY

## 2023-03-06 PROCEDURE — 99202 PR OFFICE/OUTPT VISIT, NEW, LEVL II, 15-29 MIN: ICD-10-PCS | Mod: S$PBB,,, | Performed by: SURGERY

## 2023-03-06 NOTE — PROGRESS NOTES
29 y/o woman with history of gastroparesis.      Referral was placed for Dr. Durán per note 3/2/2023    I do not place gastric pacemakers.    Another referral placed.

## 2023-03-16 ENCOUNTER — OFFICE VISIT (OUTPATIENT)
Dept: SURGERY | Facility: CLINIC | Age: 28
End: 2023-03-16
Payer: MEDICAID

## 2023-03-16 VITALS
BODY MASS INDEX: 23.41 KG/M2 | WEIGHT: 140.5 LBS | HEART RATE: 62 BPM | SYSTOLIC BLOOD PRESSURE: 132 MMHG | DIASTOLIC BLOOD PRESSURE: 74 MMHG | HEIGHT: 65 IN

## 2023-03-16 DIAGNOSIS — K31.84 GASTROPARESIS: ICD-10-CM

## 2023-03-16 PROCEDURE — 3008F PR BODY MASS INDEX (BMI) DOCUMENTED: ICD-10-PCS | Mod: CPTII,,, | Performed by: SURGERY

## 2023-03-16 PROCEDURE — 99214 OFFICE O/P EST MOD 30 MIN: CPT | Mod: S$PBB,,, | Performed by: SURGERY

## 2023-03-16 PROCEDURE — 1159F PR MEDICATION LIST DOCUMENTED IN MEDICAL RECORD: ICD-10-PCS | Mod: CPTII,,, | Performed by: SURGERY

## 2023-03-16 PROCEDURE — 3078F PR MOST RECENT DIASTOLIC BLOOD PRESSURE < 80 MM HG: ICD-10-PCS | Mod: CPTII,,, | Performed by: SURGERY

## 2023-03-16 PROCEDURE — 99999 PR PBB SHADOW E&M-EST. PATIENT-LVL III: ICD-10-PCS | Mod: PBBFAC,,, | Performed by: SURGERY

## 2023-03-16 PROCEDURE — 3008F BODY MASS INDEX DOCD: CPT | Mod: CPTII,,, | Performed by: SURGERY

## 2023-03-16 PROCEDURE — 1159F MED LIST DOCD IN RCRD: CPT | Mod: CPTII,,, | Performed by: SURGERY

## 2023-03-16 PROCEDURE — 99213 OFFICE O/P EST LOW 20 MIN: CPT | Mod: PBBFAC | Performed by: SURGERY

## 2023-03-16 PROCEDURE — 3075F SYST BP GE 130 - 139MM HG: CPT | Mod: CPTII,,, | Performed by: SURGERY

## 2023-03-16 PROCEDURE — 99999 PR PBB SHADOW E&M-EST. PATIENT-LVL III: CPT | Mod: PBBFAC,,, | Performed by: SURGERY

## 2023-03-16 PROCEDURE — 3075F PR MOST RECENT SYSTOLIC BLOOD PRESS GE 130-139MM HG: ICD-10-PCS | Mod: CPTII,,, | Performed by: SURGERY

## 2023-03-16 PROCEDURE — 99214 PR OFFICE/OUTPT VISIT, EST, LEVL IV, 30-39 MIN: ICD-10-PCS | Mod: S$PBB,,, | Performed by: SURGERY

## 2023-03-16 PROCEDURE — 3078F DIAST BP <80 MM HG: CPT | Mod: CPTII,,, | Performed by: SURGERY

## 2023-03-16 PROCEDURE — 1160F RVW MEDS BY RX/DR IN RCRD: CPT | Mod: CPTII,,, | Performed by: SURGERY

## 2023-03-16 PROCEDURE — 1160F PR REVIEW ALL MEDS BY PRESCRIBER/CLIN PHARMACIST DOCUMENTED: ICD-10-PCS | Mod: CPTII,,, | Performed by: SURGERY

## 2023-03-16 NOTE — PROGRESS NOTES
I have seen the patient, reviewed the Student's history and physical, assessment and plan. I have personally interviewed and examined the patient at bedside and: agree with the findings.     27y/o with gerd and gastroparesis.  She has had symptoms for 3 years and they are worsening.  She has severe to extremely severe symptoms.  She has tried many medications including reglan without relief.  She says thc helps.  She also has dysphagia and it feels like it is in the upper esophagus.  She has lost 60 pounds in the last year and her weight is still not stable.  She has been to the ER about 5 times in the last year.     shows several narcotics rx in 2021 but none since.    She is not able to work due to symptoms.    Cbc, cmp basically ok  Ugi with small bowel 2022 reviewed, films viewed    -Delayed gastric emptying consistent with the clinical diagnosis of gastro paresis.    -Normal appearing small bowel.    -Questionable area in the cervical esophagus.  Would consider endoscopy.  Ct 2022 reviewed, films viewed    -No abnormality.    -Stomach appears ok  Ges 2022 reviewed    -At 241 minutes, gastric emptying was noted to be 61%, c/w gastroparesis  Egd 2020 reviewed    - Normal esophagus.     - Gastritis. Biopsied.     - Normal examined duodenum.     - Biopsies were taken with a cold forceps for         histology in the second portion of the duodenum.   Ekg 2023 reviewed    -Normal    Gastroparesis with severe protein calorie malnutrition.  Awaiting repeat egd for dysphagia and questionable area in the cervical esophagus.  For an operation for gastroparesis her weight will have to be stable for 2 weeks and she thinks this is not tolerable.  For robotic j tube and peg/lap assisted.  Pcp clearance.  After she is stable for 2 weeks consider gstim or pyloric procedure.

## 2023-03-16 NOTE — PROGRESS NOTES
History & Physical    SUBJECTIVE:     History of Present Illness:  Patient is a 28 y.o. female with bmi 23.38 and PMHx of gastroparesis, GERD, and s/p lap cholecystectomy 11/21 who presents with worsening nausea and vomiting. She notes that she has been experiencing symptoms of nausea, vomiting, early satiety, bloating, epigastric pain, and belching for 3 years. She was diagnosed with gastroparesis in June 2022 by her GI clinic in [place]. Was was trialed on reglan bid 10mg for a year, omeprazole qd for 9 months, pantoprazole 40mg qd for 1 month, kerafate with every meal for one year, zofran 4mg prn, and a liquid diet, none of which relieved her symptoms. Had an GES and Upper GI done last year confirming diagnosis of gastroparesis. Of note, her GI doctor thought her symptoms could be related to her gallbladder, so she got a HIDA scan that showed a non-functional gallbladder (s/p cholecystectomy 11/21). This did not relieve her symptoms either. Pt has not been able to work since July 2022 due to her symptoms. She also finds it hard to carry out her ADLs. She denies opioid and alcohol use in the past year.    Current Gastroparesis Meds  Acid Control BID Protonix - no relief of symptoms   Nausea Control reglan 10mg bid, zofran PRN - no relief of symptoms  Pain Control - Smoking marijuana with relief of nausea and vomiting    Gastroparesis scoring  Vomting:  Severity (3) /Frequency (4)  Nausea: Severity (3) /Frequency (4)  Early satiety: Severity (3) /Frequency (4)  Bloating: Severity (3) /Frequency (2)  Postprandial fullness: Severity (3) /Frequency (3)  Epigastric pain: Severity (3) / Frequency (4)  Epigastric burning: Severity (3) / Frequency (3)      GERD Questionnaire   Yes PPI  Yes Typical heartburn, occasionally  Yes Regurgitation  Yes Dysphagia solids  Yes Dysphagia liquids  Yes Hoarseness 2/2 vomiting  Yes Sore throat 2/2 vomiting  Yes Cough  No Asthma  Yes Chest pain, occasionally as flare up from  gastroparesis  Yes Water brash  Yes Globus  Yes Nausea  Yes Vomiting         Review of patient's allergies indicates:   Allergen Reactions    Wellbutrin [bupropion hcl] Other (See Comments)     Suicidal thoughts       Current Outpatient Medications   Medication Sig Dispense Refill    cetirizine (ZYRTEC) 10 MG tablet Take 1 tablet (10 mg total) by mouth once daily. 30 tablet 0    fluticasone propionate (FLONASE) 50 mcg/actuation nasal spray 1 spray (50 mcg total) by Each Nostril route 2 (two) times daily as needed. 15 g 0    haloperidoL (HALDOL) 2 MG tablet Take 1 tablet (2 mg total) by mouth 3 (three) times daily as needed (Nausea). 15 tablet 0    hyoscyamine (LEVSIN/SL) 0.125 mg Subl Place 1 tablet (0.125 mg total) under the tongue every 4 (four) hours as needed. 180 tablet 11    linaCLOtide (LINZESS) 72 mcg Cap capsule Take 1 capsule (72 mcg total) by mouth before breakfast. 30 capsule 11    metoclopramide HCl (REGLAN) 10 MG tablet Take 1 tablet (10 mg total) by mouth every 6 (six) hours as needed. 12 tablet 0    metoclopramide HCl (REGLAN) 5 mg/5 mL Soln Take 10 mLs (10 mg total) by mouth 3 (three) times daily before meals. 210 mL 0    montelukast (SINGULAIR) 10 mg tablet Take 10 mg by mouth every evening.      ondansetron (ZOFRAN) 4 MG tablet Take 4 mg by mouth 2 (two) times daily.      pantoprazole (PROTONIX) 40 MG tablet Take 40 mg by mouth once daily.      phenylephrine-DM-guaiFENesin (DECONEX DMX) 10-17.5-400 mg Tab Take 1 tablet by mouth every 4 to 6 hours as needed (congestion/cough). 20 tablet 0     No current facility-administered medications for this visit.       Past Medical History:   Diagnosis Date    Allergic sinusitis     Anxiety     Digestive disorder     ACID REFLUX    Dyspareunia due to medical condition in female     Endometriosis     GERD (gastroesophageal reflux disease)     Pelvic pain in female     Postpartum depression     Syncope     CHRONIC/RECURRING    UTI (urinary tract infection)      H/O     Past Surgical History:   Procedure Laterality Date    COLONOSCOPY N/A 2020    Procedure: COLONOSCOPY;  Surgeon: Jonathon Chisholm MD;  Location: [facility];  Service: Endoscopy;  Laterality: N/A;    epidural for childbirth      ESOPHAGOGASTRODUODENOSCOPY N/A 2020    Procedure: ESOPHAGOGASTRODUODENOSCOPY (EGD);  Surgeon: Jonathon Chisholm MD;  Location: [facility];  Service: Endoscopy;  Laterality: N/A;    HYSTERECTOMY      IUD placement and removal      LAPAROSCOPIC CHOLECYSTECTOMY N/A 2021    Procedure: CHOLECYSTECTOMY, LAPAROSCOPIC;  Surgeon: Phillip Nguyen MD;  [facility] OR;  Service: General;  Laterality: N/A;    LAPAROSCOPIC SALPINGECTOMY Bilateral 2021    Procedure: SALPINGECTOMY, LAPAROSCOPIC, bilateral;  Surgeon: Lev Giles Jr., MD;  Location: [facility] ER OR;  Service: OB/GYN;  Laterality: Bilateral;    LAPAROSCOPIC TOTAL HYSTERECTOMY N/A 2021    Procedure: HYSTERECTOMY, TOTAL, LAPAROSCOPIC;  Surgeon: Lev Giles Jr., MD;  Location: [facility] OR;  Service: OB/GYN;  Laterality: N/A;  11:30 per Omari, negative     PELVIC LAPAROSCOPY      POSTPARTUM LIGATION OF FALLOPIAN TUBE Bilateral 2020    Procedure: LIGATION, FALLOPIAN TUBE, POSTPARTUM;  Surgeon: Lev Giles Jr., MD;  Location: [facility] OR;  Service: OB/GYN;  Laterality: Bilateral;    WISDOM TOOTH EXTRACTION       Family History   Problem Relation Age of Onset    Birth defects Sister     Diabetes Maternal Grandmother     Diabetes Paternal Grandfather     Stomach cancer Paternal Grandfather     No Known Problems Mother      Social History     Tobacco Use    Smoking status: Former     Types: Cigarettes     Quit date:      Years since quittin.2    Smokeless tobacco: Never    Tobacco comments:     Currently smokes medical marijuana    Substance Use Topics    Alcohol use: Not Currently     Comment: occasionally    Drug use: Yes     Types: Marijuana        Review of  "Systems:  Review of Systems   Constitutional:  Positive for appetite change and fatigue.   Respiratory:  Positive for cough and chest tightness. Negative for shortness of breath.    Gastrointestinal:  Positive for abdominal pain, constipation, nausea and vomiting.   Endocrine: Negative.    Genitourinary: Negative.    Musculoskeletal: Negative.    Allergic/Immunologic: Negative.    Neurological: Negative.    Hematological: Negative.    Psychiatric/Behavioral: Negative.       OBJECTIVE:     Vital Signs (Most Recent)  Pulse: 62 (03/16/23 1436)  BP: 132/74 (03/16/23 1436)  5' 5" (1.651 m)  63.7 kg (140 lb 8 oz)     Physical Exam:  Physical Exam  Constitutional:       Appearance: She is ill-appearing.      Comments: Belching and with vomit bag   HENT:      Head: Normocephalic and atraumatic.   Pulmonary:      Effort: Pulmonary effort is normal.   Abdominal:      Tenderness: There is abdominal tenderness.      Comments: Epigastric tenderness on palpation   Musculoskeletal:         General: Normal range of motion.      Cervical back: Normal range of motion and neck supple.   Skin:     General: Skin is warm and dry.   Neurological:      General: No focal deficit present.      Mental Status: She is alert and oriented to person, place, and time.   Psychiatric:         Mood and Affect: Mood normal.         Behavior: Behavior normal.     Laboratory  CBC: Reviewed  CMP: Reviewed    Diagnostic Results:  Upper GI: Reviewed  GES: Reviewed    ASSESSMENT/PLAN:     Patient is a 28 y.o. female with bmi 23.38 and PMHx of gastroparesis, GERD, and s/p lap cholecystectomy 11/21 who presents with worsening nausea and vomiting. Pt was trialed on gastroparesis meds with no relief of symptoms. Notes symptoms only relieved with smoking marijuana. She denies opioid and alcohol use in the past year.  On PE, pt is belching and with vomit bag. She is protein-malnourished. Gastroparesis symptoms are not controlled with conservative medical " management.    PLAN:  - PCP clearance for surgery  - to OR for robotic j tube and peg/lap assisted    Suni Bob, MS4  [facility]

## 2023-03-16 NOTE — H&P (VIEW-ONLY)
I have seen the patient, reviewed the Student's history and physical, assessment and plan. I have personally interviewed and examined the patient at bedside and: agree with the findings.     29y/o with gerd and gastroparesis.  She has had symptoms for 3 years and they are worsening.  She has severe to extremely severe symptoms.  She has tried many medications including reglan without relief.  She says thc helps.  She also has dysphagia and it feels like it is in the upper esophagus.  She has lost 60 pounds in the last year and her weight is still not stable.  She has been to the ER about 5 times in the last year.     shows several narcotics rx in 2021 but none since.    She is not able to work due to symptoms.    Cbc, cmp basically ok  Ugi with small bowel 2022 reviewed, films viewed    -Delayed gastric emptying consistent with the clinical diagnosis of gastro paresis.    -Normal appearing small bowel.    -Questionable area in the cervical esophagus.  Would consider endoscopy.  Ct 2022 reviewed, films viewed    -No abnormality.    -Stomach appears ok  Ges 2022 reviewed    -At 241 minutes, gastric emptying was noted to be 61%, c/w gastroparesis  Egd 2020 reviewed    - Normal esophagus.     - Gastritis. Biopsied.     - Normal examined duodenum.     - Biopsies were taken with a cold forceps for         histology in the second portion of the duodenum.   Ekg 2023 reviewed    -Normal    Gastroparesis with severe protein calorie malnutrition.  Awaiting repeat egd for dysphagia and questionable area in the cervical esophagus.  For an operation for gastroparesis her weight will have to be stable for 2 weeks and she thinks this is not tolerable.  For robotic j tube and peg/lap assisted.  Pcp clearance.  After she is stable for 2 weeks consider gstim or pyloric procedure.

## 2023-03-17 ENCOUNTER — TELEPHONE (OUTPATIENT)
Dept: SURGERY | Facility: CLINIC | Age: 28
End: 2023-03-17
Payer: MEDICAID

## 2023-03-17 DIAGNOSIS — K31.84 GASTROPARESIS: Primary | ICD-10-CM

## 2023-03-23 ENCOUNTER — TELEPHONE (OUTPATIENT)
Dept: SURGERY | Facility: CLINIC | Age: 28
End: 2023-03-23
Payer: MEDICAID

## 2023-03-23 ENCOUNTER — ANESTHESIA EVENT (OUTPATIENT)
Dept: SURGERY | Facility: HOSPITAL | Age: 28
End: 2023-03-23
Payer: MEDICAID

## 2023-03-23 RX ORDER — MIDAZOLAM HYDROCHLORIDE 1 MG/ML
.5-4 INJECTION INTRAMUSCULAR; INTRAVENOUS
Status: CANCELLED | OUTPATIENT
Start: 2023-03-23

## 2023-03-23 NOTE — ANESTHESIA PREPROCEDURE EVALUATION
[facility]  Anesthesia Pre-Operative Evaluation         Patient Name: Tess Rodríguez  YOB: 1995  MRN: [id number]    SUBJECTIVE:     Pre-operative evaluation for Procedure(s) (LRB):  XI ROBOTIC JEJUNOSTOMY (W/ PEG TUBE PLACEMENT) (N/A)     03/23/2023    Tess Rodríguez is a 28 y.o. female w/ a significant PMHx of gastroparesis with severe symptoms leading to weight loss.   Pt has daily nausea and vomiting. This morning she reports nausea without vomiting and mild abdominal pain.    Patient now presents for the above procedure(s).    Prev airway [12/2021]:  Method of Intubation: Direct laryngoscopy;   Mask Ventilation: Easy  Grade: Grade II  Complicating Factors: None    Patient Active Problem List   Diagnosis    Prolonged latent phase of labor    Pelvic pain in female    Hyperventilation    Epigastric pain    Right lower quadrant abdominal pain    Abdominal pain affecting pregnancy    Vomiting affecting pregnancy, antepartum    Uterine contractions during pregnancy    Primary dysmenorrhea    Gastroparesis       Review of patient's allergies indicates:   Allergen Reactions    Wellbutrin [bupropion hcl] Other (See Comments)     Suicidal thoughts       Current Inpatient Medications:      No current facility-administered medications on file prior to encounter.     Current Outpatient Medications on File Prior to Encounter   Medication Sig Dispense Refill    ondansetron (ZOFRAN) 4 MG tablet Take 4 mg by mouth 2 (two) times daily.      pantoprazole (PROTONIX) 40 MG tablet Take 40 mg by mouth once daily.      cetirizine (ZYRTEC) 10 MG tablet Take 1 tablet (10 mg total) by mouth once daily. 30 tablet 0    fluticasone propionate (FLONASE) 50 mcg/actuation nasal spray 1 spray (50 mcg total) by Each Nostril route 2 (two) times daily as needed. 15 g 0    haloperidoL (HALDOL) 2 MG tablet Take 1 tablet (2 mg total) by mouth 3 (three) times daily as needed (Nausea). 15 tablet 0     hyoscyamine (LEVSIN/SL) 0.125 mg Subl Place 1 tablet (0.125 mg total) under the tongue every 4 (four) hours as needed. 180 tablet 11    linaCLOtide (LINZESS) 72 mcg Cap capsule Take 1 capsule (72 mcg total) by mouth before breakfast. 30 capsule 11    metoclopramide HCl (REGLAN) 10 MG tablet Take 1 tablet (10 mg total) by mouth every 6 (six) hours as needed. 12 tablet 0    metoclopramide HCl (REGLAN) 5 mg/5 mL Soln Take 10 mLs (10 mg total) by mouth 3 (three) times daily before meals. 210 mL 0    phenylephrine-DM-guaiFENesin (DECONEX DMX) 10-17.5-400 mg Tab Take 1 tablet by mouth every 4 to 6 hours as needed (congestion/cough). 20 tablet 0       Past Surgical History:   Procedure Laterality Date    COLONOSCOPY N/A 1/30/2020    Procedure: COLONOSCOPY;  Surgeon: Jonathon Chisholm MD;  Location: [facility];  Service: Endoscopy;  Laterality: N/A;    epidural for childbirth      ESOPHAGOGASTRODUODENOSCOPY N/A 1/30/2020    Procedure: ESOPHAGOGASTRODUODENOSCOPY (EGD);  Surgeon: Jonathon Chisholm MD;  Location: [facility];  Service: Endoscopy;  Laterality: N/A;    HYSTERECTOMY      IUD placement and removal      LAPAROSCOPIC CHOLECYSTECTOMY N/A 12/14/2021    Procedure: CHOLECYSTECTOMY, LAPAROSCOPIC;  Surgeon: Phillip Nguyen MD;  Location: [facility];  Service: General;  Laterality: N/A;    LAPAROSCOPIC SALPINGECTOMY Bilateral 7/27/2021    Procedure: SALPINGECTOMY, LAPAROSCOPIC, bilateral;  Surgeon: Lev Giles Jr., MD;  Location: [facility] OR;  Service: OB/GYN;  Laterality: Bilateral;    LAPAROSCOPIC TOTAL HYSTERECTOMY N/A 7/27/2021    Procedure: HYSTERECTOMY, TOTAL, LAPAROSCOPIC;  Surgeon: Lev Giles Jr., MD;  Location: [facility] OR;  Service: OB/GYN;  Laterality: N/A;  11:30 per Omari, negative 07/20    PELVIC LAPAROSCOPY      POSTPARTUM LIGATION OF FALLOPIAN TUBE Bilateral 9/30/2020    Procedure: LIGATION, FALLOPIAN TUBE, POSTPARTUM;  Surgeon: Lev Giles Jr., MD;  Location:  [facility] OR;  Service: OB/GYN;  Laterality: Bilateral;    WISDOM TOOTH EXTRACTION         OBJECTIVE:     Vital Signs Range (Last 24H):         Significant Labs:  Lab Results   Component Value Date    WBC 5.57 02/07/2023    HGB 12.8 02/07/2023    HCT 37.8 02/07/2023     02/07/2023     02/07/2023    K 3.5 02/07/2023     02/07/2023    CREATININE 0.8 02/07/2023    BUN 8 02/07/2023    CO2 22 (L) 02/07/2023    INR 1.0 05/16/2020       Diagnostic Studies: No relevant studies.    EKG:   Results for orders placed or performed during the hospital encounter of 02/02/23   EKG 12-lead    Collection Time: 02/02/23  1:00 AM    Narrative    Test Reason : R07.9,    Vent. Rate : 098 BPM     Atrial Rate : 098 BPM     P-R Int : 134 ms          QRS Dur : 084 ms      QT Int : 358 ms       P-R-T Axes : 052 030 011 degrees     QTc Int : 457 ms    Normal sinus rhythm  Normal ECG  When compared with ECG of 30-NOV-2020 20:51,  No significant change was found  Confirmed by Gray Tamez MD ([id number]) on 2/2/2023 1:23:40 PM    Referred By: NO PCP           Confirmed By:Gray Tamez MD         ASSESSMENT/PLAN:       Pre-op Assessment    I have reviewed the Patient Summary Reports.     I have reviewed the Nursing Notes. I have reviewed the NPO Status.   I have reviewed the Medications.     Review of Systems  Anesthesia Hx:  No problems with previous Anesthesia  Denies Family Hx of Anesthesia complications.   Denies Personal Hx of Anesthesia complications.   Social:  Marijuana use daily   Cardiovascular:  Cardiovascular Normal     Pulmonary:  Pulmonary Normal    Hepatic/GI:   GERD, poorly controlled Gastroparesis    Neurological:  Neurology Normal    Psych:   Psychiatric History anxiety          Physical Exam  General: Well nourished, Alert, Oriented and Cooperative    Airway:  Mallampati: I   Mouth Opening: Normal  TM Distance: Normal  Neck ROM: Normal ROM    Dental:  Intact    Chest/Lungs:  Normal Respiratory Rate, Clear  to auscultation    Heart:  Rate: Normal  Rhythm: Regular Rhythm        Anesthesia Plan  Type of Anesthesia, risks & benefits discussed:    Anesthesia Type: Gen ETT, Regional  Intra-op Monitoring Plan: Standard ASA Monitors  Post Op Pain Control Plan: multimodal analgesia  Induction:  rapid sequence  Airway Plan: Video, Post-Induction  Informed Consent: Informed consent signed with the Patient and all parties understand the risks and agree with anesthesia plan.  All questions answered.   ASA Score: 2  Day of Surgery Review of History & Physical: H&P Update referred to the surgeon/provider.    Ready For Surgery From Anesthesia Perspective.     .

## 2023-03-24 ENCOUNTER — ANESTHESIA (OUTPATIENT)
Dept: SURGERY | Facility: HOSPITAL | Age: 28
End: 2023-03-24
Payer: MEDICAID

## 2023-03-24 ENCOUNTER — HOSPITAL ENCOUNTER (OUTPATIENT)
Facility: HOSPITAL | Age: 28
Discharge: HOME HEALTH | End: 2023-03-26
Attending: SURGERY | Admitting: SURGERY
Payer: MEDICAID

## 2023-03-24 DIAGNOSIS — K31.84 GASTROPARESIS: Primary | ICD-10-CM

## 2023-03-24 PROCEDURE — 63600175 PHARM REV CODE 636 W HCPCS: Performed by: STUDENT IN AN ORGANIZED HEALTH CARE EDUCATION/TRAINING PROGRAM

## 2023-03-24 PROCEDURE — 25000003 PHARM REV CODE 250: Performed by: STUDENT IN AN ORGANIZED HEALTH CARE EDUCATION/TRAINING PROGRAM

## 2023-03-24 PROCEDURE — 71000016 HC POSTOP RECOV ADDL HR: Performed by: SURGERY

## 2023-03-24 PROCEDURE — 71000033 HC RECOVERY, INTIAL HOUR: Performed by: SURGERY

## 2023-03-24 PROCEDURE — 00790 ANES IPER UPR ABD NOS: CPT | Performed by: SURGERY

## 2023-03-24 PROCEDURE — 44186 PR LAP, SURG JEJUNOSTOMY: ICD-10-PCS | Mod: ,,, | Performed by: SURGERY

## 2023-03-24 PROCEDURE — 37000009 HC ANESTHESIA EA ADD 15 MINS: Performed by: SURGERY

## 2023-03-24 PROCEDURE — D9220A PRA ANESTHESIA: ICD-10-PCS | Mod: ,,, | Performed by: ANESTHESIOLOGY

## 2023-03-24 PROCEDURE — 25000003 PHARM REV CODE 250: Performed by: ANESTHESIOLOGY

## 2023-03-24 PROCEDURE — D9220A PRA ANESTHESIA: Mod: ,,, | Performed by: ANESTHESIOLOGY

## 2023-03-24 PROCEDURE — 36000710: Performed by: SURGERY

## 2023-03-24 PROCEDURE — 27201423 OPTIME MED/SURG SUP & DEVICES STERILE SUPPLY: Performed by: SURGERY

## 2023-03-24 PROCEDURE — 44186 LAP JEJUNOSTOMY: CPT | Mod: ,,, | Performed by: SURGERY

## 2023-03-24 PROCEDURE — 36000711: Performed by: SURGERY

## 2023-03-24 PROCEDURE — 63600175 PHARM REV CODE 636 W HCPCS

## 2023-03-24 PROCEDURE — 37000008 HC ANESTHESIA 1ST 15 MINUTES: Performed by: SURGERY

## 2023-03-24 PROCEDURE — 27800903 OPTIME MED/SURG SUP & DEVICES OTHER IMPLANTS: Performed by: SURGERY

## 2023-03-24 PROCEDURE — 71000015 HC POSTOP RECOV 1ST HR: Performed by: SURGERY

## 2023-03-24 DEVICE — TUBE BLLN MIC JENUNAL FEED 12F: Type: IMPLANTABLE DEVICE | Site: ABDOMEN | Status: FUNCTIONAL

## 2023-03-24 RX ORDER — DEXAMETHASONE SODIUM PHOSPHATE 4 MG/ML
INJECTION, SOLUTION INTRA-ARTICULAR; INTRALESIONAL; INTRAMUSCULAR; INTRAVENOUS; SOFT TISSUE
Status: DISCONTINUED | OUTPATIENT
Start: 2023-03-24 | End: 2023-03-24

## 2023-03-24 RX ORDER — ACETAMINOPHEN 325 MG/1
650 TABLET ORAL EVERY 6 HOURS
Status: DISCONTINUED | OUTPATIENT
Start: 2023-03-24 | End: 2023-03-25

## 2023-03-24 RX ORDER — MIDAZOLAM HYDROCHLORIDE 1 MG/ML
INJECTION INTRAMUSCULAR; INTRAVENOUS
Status: DISCONTINUED | OUTPATIENT
Start: 2023-03-24 | End: 2023-03-24

## 2023-03-24 RX ORDER — ONDANSETRON 2 MG/ML
INJECTION INTRAMUSCULAR; INTRAVENOUS
Status: DISCONTINUED | OUTPATIENT
Start: 2023-03-24 | End: 2023-03-24

## 2023-03-24 RX ORDER — METOCLOPRAMIDE 5 MG/1
10 TABLET ORAL EVERY 6 HOURS PRN
Status: DISCONTINUED | OUTPATIENT
Start: 2023-03-24 | End: 2023-03-26 | Stop reason: HOSPADM

## 2023-03-24 RX ORDER — PROPOFOL 10 MG/ML
VIAL (ML) INTRAVENOUS
Status: DISCONTINUED | OUTPATIENT
Start: 2023-03-24 | End: 2023-03-24

## 2023-03-24 RX ORDER — SUCCINYLCHOLINE CHLORIDE 20 MG/ML
INJECTION INTRAMUSCULAR; INTRAVENOUS
Status: DISCONTINUED | OUTPATIENT
Start: 2023-03-24 | End: 2023-03-24

## 2023-03-24 RX ORDER — PANTOPRAZOLE SODIUM 40 MG/1
40 TABLET, DELAYED RELEASE ORAL DAILY
Status: DISCONTINUED | OUTPATIENT
Start: 2023-03-24 | End: 2023-03-25

## 2023-03-24 RX ORDER — BUPIVACAINE HYDROCHLORIDE 7.5 MG/ML
INJECTION, SOLUTION EPIDURAL; RETROBULBAR
Status: COMPLETED | OUTPATIENT
Start: 2023-03-24 | End: 2023-03-24

## 2023-03-24 RX ORDER — ONDANSETRON 2 MG/ML
4 INJECTION INTRAMUSCULAR; INTRAVENOUS EVERY 4 HOURS PRN
Status: DISCONTINUED | OUTPATIENT
Start: 2023-03-24 | End: 2023-03-26 | Stop reason: HOSPADM

## 2023-03-24 RX ORDER — KETOROLAC TROMETHAMINE 15 MG/ML
15 INJECTION, SOLUTION INTRAMUSCULAR; INTRAVENOUS EVERY 8 HOURS PRN
Status: DISCONTINUED | OUTPATIENT
Start: 2023-03-24 | End: 2023-03-24 | Stop reason: HOSPADM

## 2023-03-24 RX ORDER — ACETAMINOPHEN 10 MG/ML
INJECTION, SOLUTION INTRAVENOUS
Status: DISCONTINUED | OUTPATIENT
Start: 2023-03-24 | End: 2023-03-24

## 2023-03-24 RX ORDER — DEXTROSE MONOHYDRATE, SODIUM CHLORIDE, AND POTASSIUM CHLORIDE 50; 1.49; 4.5 G/1000ML; G/1000ML; G/1000ML
INJECTION, SOLUTION INTRAVENOUS CONTINUOUS
Status: DISCONTINUED | OUTPATIENT
Start: 2023-03-24 | End: 2023-03-26 | Stop reason: HOSPADM

## 2023-03-24 RX ORDER — NEOSTIGMINE METHYLSULFATE 0.5 MG/ML
INJECTION, SOLUTION INTRAVENOUS
Status: DISCONTINUED | OUTPATIENT
Start: 2023-03-24 | End: 2023-03-24

## 2023-03-24 RX ORDER — ROCURONIUM BROMIDE 10 MG/ML
INJECTION, SOLUTION INTRAVENOUS
Status: DISCONTINUED | OUTPATIENT
Start: 2023-03-24 | End: 2023-03-24

## 2023-03-24 RX ORDER — HALOPERIDOL 5 MG/ML
0.5 INJECTION INTRAMUSCULAR EVERY 10 MIN PRN
Status: DISCONTINUED | OUTPATIENT
Start: 2023-03-24 | End: 2023-03-24 | Stop reason: HOSPADM

## 2023-03-24 RX ORDER — CEFAZOLIN SODIUM 1 G/3ML
INJECTION, POWDER, FOR SOLUTION INTRAMUSCULAR; INTRAVENOUS
Status: DISCONTINUED | OUTPATIENT
Start: 2023-03-24 | End: 2023-03-24

## 2023-03-24 RX ORDER — LIDOCAINE HYDROCHLORIDE 20 MG/ML
INJECTION INTRAVENOUS
Status: DISCONTINUED | OUTPATIENT
Start: 2023-03-24 | End: 2023-03-24

## 2023-03-24 RX ORDER — GABAPENTIN 300 MG/1
300 CAPSULE ORAL 3 TIMES DAILY
Status: DISCONTINUED | OUTPATIENT
Start: 2023-03-24 | End: 2023-03-25

## 2023-03-24 RX ORDER — SODIUM CHLORIDE 0.9 % (FLUSH) 0.9 %
10 SYRINGE (ML) INJECTION
Status: DISCONTINUED | OUTPATIENT
Start: 2023-03-24 | End: 2023-03-24 | Stop reason: HOSPADM

## 2023-03-24 RX ORDER — KETOROLAC TROMETHAMINE 15 MG/ML
15 INJECTION, SOLUTION INTRAMUSCULAR; INTRAVENOUS EVERY 6 HOURS
Status: DISCONTINUED | OUTPATIENT
Start: 2023-03-24 | End: 2023-03-26 | Stop reason: HOSPADM

## 2023-03-24 RX ORDER — ESMOLOL HYDROCHLORIDE 10 MG/ML
INJECTION INTRAVENOUS
Status: DISCONTINUED | OUTPATIENT
Start: 2023-03-24 | End: 2023-03-24

## 2023-03-24 RX ORDER — HYDROMORPHONE HYDROCHLORIDE 1 MG/ML
0.2 INJECTION, SOLUTION INTRAMUSCULAR; INTRAVENOUS; SUBCUTANEOUS EVERY 4 HOURS PRN
Status: DISCONTINUED | OUTPATIENT
Start: 2023-03-24 | End: 2023-03-26 | Stop reason: HOSPADM

## 2023-03-24 RX ORDER — SCOLOPAMINE TRANSDERMAL SYSTEM 1 MG/1
PATCH, EXTENDED RELEASE TRANSDERMAL
Status: DISPENSED
Start: 2023-03-24 | End: 2023-03-24

## 2023-03-24 RX ORDER — SODIUM CHLORIDE 9 MG/ML
INJECTION, SOLUTION INTRAVENOUS CONTINUOUS
Status: DISCONTINUED | OUTPATIENT
Start: 2023-03-24 | End: 2023-03-24

## 2023-03-24 RX ORDER — KETAMINE HCL IN 0.9 % NACL 50 MG/5 ML
SYRINGE (ML) INTRAVENOUS
Status: DISCONTINUED | OUTPATIENT
Start: 2023-03-24 | End: 2023-03-24

## 2023-03-24 RX ORDER — DEXMEDETOMIDINE HYDROCHLORIDE 100 UG/ML
INJECTION, SOLUTION INTRAVENOUS
Status: DISCONTINUED | OUTPATIENT
Start: 2023-03-24 | End: 2023-03-24

## 2023-03-24 RX ORDER — SCOLOPAMINE TRANSDERMAL SYSTEM 1 MG/1
1 PATCH, EXTENDED RELEASE TRANSDERMAL
Status: DISCONTINUED | OUTPATIENT
Start: 2023-03-24 | End: 2023-03-26 | Stop reason: HOSPADM

## 2023-03-24 RX ADMIN — ACETAMINOPHEN 1000 MG: 10 INJECTION, SOLUTION INTRAVENOUS at 10:03

## 2023-03-24 RX ADMIN — KETOROLAC TROMETHAMINE 15 MG: 15 INJECTION, SOLUTION INTRAMUSCULAR; INTRAVENOUS at 05:03

## 2023-03-24 RX ADMIN — Medication 20 MG: at 12:03

## 2023-03-24 RX ADMIN — CEFAZOLIN 2 G: 330 INJECTION, POWDER, FOR SOLUTION INTRAMUSCULAR; INTRAVENOUS at 10:03

## 2023-03-24 RX ADMIN — HYDROMORPHONE HYDROCHLORIDE 0.2 MG: 1 INJECTION, SOLUTION INTRAMUSCULAR; INTRAVENOUS; SUBCUTANEOUS at 08:03

## 2023-03-24 RX ADMIN — MIDAZOLAM HYDROCHLORIDE 2 MG: 1 INJECTION, SOLUTION INTRAMUSCULAR; INTRAVENOUS at 09:03

## 2023-03-24 RX ADMIN — HALOPERIDOL LACTATE 0.5 MG: 5 INJECTION, SOLUTION INTRAMUSCULAR at 12:03

## 2023-03-24 RX ADMIN — PROPOFOL 160 MG: 10 INJECTION, EMULSION INTRAVENOUS at 09:03

## 2023-03-24 RX ADMIN — ESMOLOL HYDROCHLORIDE 45 MG: 100 INJECTION, SOLUTION INTRAVENOUS at 09:03

## 2023-03-24 RX ADMIN — DEXAMETHASONE SODIUM PHOSPHATE 4 MG: 4 INJECTION, SOLUTION INTRAMUSCULAR; INTRAVENOUS at 10:03

## 2023-03-24 RX ADMIN — SODIUM CHLORIDE: 0.9 INJECTION, SOLUTION INTRAVENOUS at 09:03

## 2023-03-24 RX ADMIN — DEXTROSE, SODIUM CHLORIDE, AND POTASSIUM CHLORIDE: 5; .45; .15 INJECTION INTRAVENOUS at 07:03

## 2023-03-24 RX ADMIN — METHOCARBAMOL 500 MG: 100 INJECTION, SOLUTION INTRAMUSCULAR; INTRAVENOUS at 06:03

## 2023-03-24 RX ADMIN — ROCURONIUM BROMIDE 30 MG: 10 INJECTION, SOLUTION INTRAVENOUS at 10:03

## 2023-03-24 RX ADMIN — DEXMEDETOMIDINE HYDROCHLORIDE 12 MCG: 100 INJECTION, SOLUTION INTRAVENOUS at 10:03

## 2023-03-24 RX ADMIN — ONDANSETRON 4 MG: 2 INJECTION INTRAMUSCULAR; INTRAVENOUS at 11:03

## 2023-03-24 RX ADMIN — SCOPALAMINE 1 PATCH: 1 PATCH, EXTENDED RELEASE TRANSDERMAL at 09:03

## 2023-03-24 RX ADMIN — NEOSTIGMINE METHYLSULFATE 5 MG: 0.5 INJECTION, SOLUTION INTRAVENOUS at 11:03

## 2023-03-24 RX ADMIN — METHOCARBAMOL 500 MG: 100 INJECTION, SOLUTION INTRAMUSCULAR; INTRAVENOUS at 11:03

## 2023-03-24 RX ADMIN — KETOROLAC TROMETHAMINE 15 MG: 15 INJECTION, SOLUTION INTRAMUSCULAR; INTRAVENOUS at 11:03

## 2023-03-24 RX ADMIN — SUCCINYLCHOLINE CHLORIDE 100 MG: 20 INJECTION, SOLUTION INTRAMUSCULAR; INTRAVENOUS at 09:03

## 2023-03-24 RX ADMIN — DEXTROSE, SODIUM CHLORIDE, AND POTASSIUM CHLORIDE: 5; .45; .15 INJECTION INTRAVENOUS at 12:03

## 2023-03-24 RX ADMIN — DEXMEDETOMIDINE HYDROCHLORIDE 12 MCG: 100 INJECTION, SOLUTION INTRAVENOUS at 12:03

## 2023-03-24 RX ADMIN — BUPIVACAINE HYDROCHLORIDE 30 ML: 7.5 INJECTION, SOLUTION EPIDURAL; RETROBULBAR at 09:03

## 2023-03-24 RX ADMIN — Medication 10 MG: at 11:03

## 2023-03-24 RX ADMIN — LIDOCAINE HYDROCHLORIDE 70 MG: 20 INJECTION INTRAVENOUS at 09:03

## 2023-03-24 RX ADMIN — METHOCARBAMOL 500 MG: 100 INJECTION, SOLUTION INTRAMUSCULAR; INTRAVENOUS at 12:03

## 2023-03-24 RX ADMIN — Medication 30 MG: at 10:03

## 2023-03-24 RX ADMIN — GLYCOPYRROLATE 0.6 MG: 0.2 INJECTION, SOLUTION INTRAMUSCULAR; INTRAVENOUS at 11:03

## 2023-03-24 NOTE — PLAN OF CARE
[facility]  Initial Discharge Assessment       Primary Care Provider: Eusebio Escamilla MD    Admission Diagnosis: Gastroparesis [K31.84]    Admission Date: 3/24/2023  Expected Discharge Date: 3/26/2023    Discharge Barriers Identified: None    Payor: MEDICAID / Plan: [place] [employer] / Product Type: Managed Medicaid /     Extended Emergency Contact Information  Primary Emergency Contact: SHAQUILLE MARIA  Mobile Phone: [phone]  Relation: Significant other  Preferred language: English   needed? No  Secondary Emergency Contact: Michael Guevaraey  Mobile Phone: [phone]  Relation: Friend    Discharge Plan A: Home Health, Home with family  Discharge Plan B: Home with family      [facility] - [place], [place] - [address]  [address]  [place] [address]  Phone: [phone] Fax: [phone]    [facility] - [place], [place] - [address]  [address]  [place] [address]  Phone: [phone] Fax: [phone]    [id number] - [place], [place] - [address]  [address]  [place] [address]  Phone: [phone] Fax: [phone]      Initial Assessment (most recent)       Adult Discharge Assessment - 03/24/23 1415          Discharge Assessment    Assessment Type Discharge Planning Assessment     Confirmed/corrected address, phone number and insurance Yes     Confirmed Demographics Correct on Facesheet     Source of Information patient;family   Shaquille plasencia    Communicated RIN with patient/caregiver Yes     People in Home child(aida), dependent;significant other   Shaquille plasencia    Do you expect to return to your current living situation? Yes     Do you have help at home or someone to help you manage your care at home? Yes     Who are your caregiver(s) and their phone number(s)? Shaquille plasencia     Prior to hospitilization cognitive status: Alert/Oriented      Current cognitive status: Alert/Oriented     Home Layout Able to live on 1st floor     Equipment Currently Used at Home none     Do you currently have service(s) that help you manage your care at home? No     Do you take prescription medications? Yes     Do you have prescription coverage? Yes     Do you have any problems affording any of your prescribed medications? No     Is the patient taking medications as prescribed? yes     How do you get to doctors appointments? car, drives self;family or friend will provide     Are you on dialysis? No     Do you take coumadin? No     Discharge Plan A Home Health;Home with family     Discharge Plan B Home with family     DME Needed Upon Discharge  none     Discharge Plan discussed with: Patient;Spouse/sig other     Discharge Barriers Identified None                   Patient lives with Jonathon plasencia and six children ages 2,6,9,12,16 and 19 in a single story home with one small entry step. Patient does not have preference of HH or infusion company. Amenable to referrals to [facility]. Patient states she will discharge Sunday if she tolerates tube feedings. Jonathon to provide transportation home.

## 2023-03-24 NOTE — BRIEF OP NOTE
Operative Note       Surgery Date: 3/24/2023     Surgeon(s) and Role:     * Kelvin Durán MD - Primary     * KATIUSKA Weaver MD - Resident - Assisting    Pre-op Diagnosis:  Gastroparesis [K31.84]    Post-op Diagnosis:  Gastroparesis [K31.84]    Procedure(s) (LRB):  XI ROBOTIC JEJUNOSTOMY (N/A)  INSERTION, PEG TUBE    Anesthesia: General/Regional    Procedure in Detail/Findings:  J tube and peg without apparent complication.    Estimated Blood Loss: Minimal           Specimens (From admission, onward)      None          Implants:   Implant Name Type Inv. Item Serial No.  Lot No. LRB No. Used Action   TUBE BLLN BARBRA JENUNAL FEED 12F - [id number]  TUBE BLLN BARBRA JENUNAL FEED 12F  [facility] [id number] N/A 1 Implanted              Disposition: PACU - hemodynamically stable.           Condition: Good    Attestation:  I was present and scrubbed for the entire procedure.

## 2023-03-24 NOTE — NURSING TRANSFER
Nursing Transfer Note      3/24/2023     Reason patient is being transferred: post op    Transfer To: [id number]    Transfer via bed    Transfer with IVF infusing     Transported by PCT X 2    Medicines sent: None    Any special needs or follow-up needed: None    Chart send with patient: Yes    Notified: pt spouse     Patient reassessed at: 3/24/2023 1245 Report given to Tram     Upon arrival to floor: patient oriented to room, call bell in reach, and bed in lowest position

## 2023-03-24 NOTE — BRIEF OP NOTE
[facility]  Surgery Department  Operative Note    SUMMARY     Date of Procedure: 3/24/2023     Procedure: Procedure(s) (LRB):  XI ROBOTIC JEJUNOSTOMY (N/A)  INSERTION, PEG TUBE     Surgeon(s) and Role:     * Kelvin Durán MD - Primary     * KATIUSKA Weaver MD - Resident - Assisting        Pre-Operative Diagnosis: Gastroparesis [K31.84]    Post-Operative Diagnosis: Post-Op Diagnosis Codes:     * Gastroparesis [K31.84]    Anesthesia: General/Regional    Operative Findings (including complications, if any): expected anatomy    Description of Technical Procedures: Patient brought to OR. Placed supine. Prepped and draped. Time out called and all in agreement.    A initially a a 5 mm trocar was attempted to be placed in the Optiview method in the right mid abdomen in the anterior axillary line.  However, the fascia was of very tough here and we did not feel comfortable accessing this point.  We then attempted with the same method in the left upper quadrant at the expected site of the J-tube and were successful in establishing pneumoperitoneum.      A 3 8 mm ports were placed in the right anterior axillary line and the robot was brought in and docked to the patient.      We elevated the transverse colon identified ligament of Treitz.  We then measured out until we were able to bring a loop of jejunum up to the anterior abdominal wall.  This was then secured laterally with a running 2 0 V lock suture.  A 2 0 chromic was then used to make a pursestring.  An enterotomy was made in the middle of the pursestring.  The J-tube which had been previously placed through the initial 5 mm port entry site was then advanced into the jejunum.  It was clearly within the jejunal lumen.  We then tightened the pursestring and tied off and then completed at circumferential suturing of the small bowel to the anterior abdominal wall with the 2-0 be locked.      We would then undocked the robot and turned our attention to the  PEG tube.  Under laparoscopic visualization we insufflated the stomach with the gastroscope.  We then advanced a needle through the skin in the left upper quadrant and into the lumen of the stomach.  We passed a wire through this which was grasped with the gastroscope in withdrawn.  The PEG tube was then attached to the wire then the wire was pulled through the stomach and through the abdominal wall.  It was secured in place at 3 cm at the skin.  There was no bleeding in the stomach.  It rotated freely.      She tolerated these 2 procedures very well.  She was brought to PACU in stable condition.      Estimated Blood Loss (EBL): 10 ml           Implants:   Implant Name Type Inv. Item Serial No.  Lot No. LRB No. Used Action   TUBE BLLN BARBRA JENUNAL FEED 12F - [id number]  TUBE BLLN BARBRA JENUNAL FEED 12F  [facility] [id number] N/A 1 Implanted       Specimens:   Specimen (24h ago, onward)      None                    Condition: Good    Disposition: PACU - hemodynamically stable.    Attestation: I was present and scrubbed for the entire procedure.    KATIUSKA Weaver MD   General Surgery, PGY-5

## 2023-03-24 NOTE — OP NOTE
[facility]  Surgery Department  Operative Note    SUMMARY     Date of Procedure: 3/24/2023     Procedure: Procedure(s) (LRB):  XI ROBOTIC JEJUNOSTOMY (N/A)  INSERTION, PEG TUBE     Surgeon(s) and Role:     * Kelvin Durán MD - Primary     * KATIUSKA Weaver MD - Resident - Assisting        Pre-Operative Diagnosis: Gastroparesis [K31.84]    Post-Operative Diagnosis: Post-Op Diagnosis Codes:     * Gastroparesis [K31.84]    Anesthesia: General/Regional    Operative Findings (including complications, if any): expected anatomy    Description of Technical Procedures: Patient brought to OR. Placed supine. Prepped and draped. Time out called and all in agreement.    A initially a a 5 mm trocar was attempted to be placed in the Optiview method in the right mid abdomen in the anterior axillary line.  However, the fascia was of very tough here and we did not feel comfortable accessing this point.  We then attempted with the same method in the left upper quadrant at the expected site of the J-tube and were successful in establishing pneumoperitoneum.      A 3 8 mm ports were placed in the right anterior axillary line and the robot was brought in and docked to the patient.      We elevated the transverse colon identified ligament of Treitz.  We then measured out until we were able to bring a loop of jejunum up to the anterior abdominal wall.  This was then secured laterally with a running 2 0 V lock suture.  A 2 0 chromic was then used to make a pursestring.  An enterotomy was made in the middle of the pursestring.  The 12F J-tube which had been previously placed through the initial 5 mm port entry site was then advanced into the jejunum. The balloon was not inflated. It was clearly within the jejunal lumen.  We then tightened the pursestring and tied off and then completed at circumferential suturing of the small bowel to the anterior abdominal wall with the 2-0 be locked.  Jtube secured to skin with three 2-0  nylon.    We then undocked the robot and turned our attention to the PEG tube.  Under laparoscopic visualization we insufflated the stomach with the gastroscope.  We then advanced a needle through the skin in the left upper quadrant and into the lumen of the stomach.  We passed a wire through this which was grasped with the gastroscope in withdrawn.  The PEG tube was then attached to the wire then the wire was pulled through the stomach and through the abdominal wall.  It was secured in place at 3 cm at the skin.  There was no bleeding in the stomach.  It rotated freely.      Then skin was closed with  4-0 monocryl and dermabond.    She tolerated these 2 procedures very well.  She was brought to PACU in stable condition.      Estimated Blood Loss (EBL): 10 ml           Implants:   Implant Name Type Inv. Item Serial No.  Lot No. LRB No. Used Action   TUBE BLLN BARBRA JENUNAL FEED 12F - [id number]  TUBE BLLN BARBRA JENUNAL FEED 12F  [facility] [id number] N/A 1 Implanted       Specimens:   Specimen (24h ago, onward)      None                    Condition: Good    Disposition: PACU - hemodynamically stable.    Attestation: I was present and scrubbed for the entire procedure.    KATIUSKA Weaver MD   General Surgery, PGY-5

## 2023-03-24 NOTE — TRANSFER OF CARE
"Anesthesia Transfer of Care Note    Patient: Tess Rodríguez    Procedure(s) Performed: Procedure(s) (LRB):  XI ROBOTIC JEJUNOSTOMY (N/A)  INSERTION, PEG TUBE    Patient location: PACU    Anesthesia Type: general    Transport from OR: Transported from OR on 6-10 L/min O2 by face mask with adequate spontaneous ventilation    Post pain: adequate analgesia    Post assessment: no apparent anesthetic complications    Post vital signs: stable    Level of consciousness: awake and alert    Nausea/Vomiting: no nausea/vomiting    Complications: none    Transfer of care protocol was followed      Last vitals:   Visit Vitals  /89   Pulse 61   Temp 36.5 °C (97.7 °F) (Temporal)   Resp (!) 22   Ht 5' 5" (1.651 m)   Wt 63.7 kg (140 lb 6.9 oz)   LMP 10/06/2021   SpO2 97%   Breastfeeding No   BMI 23.37 kg/m²     "

## 2023-03-24 NOTE — PLAN OF CARE
1) When medically able imitate Spiral Gateway Peptide 1.5 @ 20 ml/hr, slowly advance to goal rate of Spiral Gateway Peptide 1.5 @ 50 ml/hr   -Provides 1846 kcals, 89 g protein, 840 ml fluid   -Monitor tolerance     2) ADAT per Physician       3) RD following    Goals: Meet % een/epn by next rd f/u  Nutrition Goal Status: new  Communication of RD Recs: other (comment) (POC)

## 2023-03-24 NOTE — BRIEF OP NOTE
[facility] - [facility] ([facility])  Brief Operative Note    SUMMARY     Surgery Date: 3/24/2023     Surgeon(s) and Role:     * Kelvin Durán MD - Primary     * KATIUSKA Weaver MD - Resident - Assisting        Pre-op Diagnosis:  Gastroparesis [K31.84]    Post-op Diagnosis:  Post-Op Diagnosis Codes:     * Gastroparesis [K31.84]    Procedure(s) (LRB):  XI ROBOTIC JEJUNOSTOMY (N/A)  INSERTION, PEG TUBE    Anesthesia: General/Regional    Operative Findings: expected anatomy    Estimated Blood Loss: 5 ml    Estimated Blood Loss has been documented.         Specimens:   Specimen (24h ago, onward)      None            [id number]        KATIUSKA Weaver MD   General Surgery, PGY-5

## 2023-03-24 NOTE — PLAN OF CARE
Brandyn angella - Surgery ([facility])      HOME HEALTH ORDERS  FACE TO FACE ENCOUNTER    Patient Name: Tess Rodríguez  YOB: 1995    PCP: Eusebio Escamilla MD   PCP Address: [address] / [place] [address]  PCP Phone Number: [phone]  PCP Fax: [phone]    Encounter Date: 3/17/23    Admit to Home Health    Diagnoses:  There are no hospital problems to display for this patient.      Follow Up Appointments:  No future appointments.    Allergies:  Review of patient's allergies indicates:   Allergen Reactions    Wellbutrin [bupropion hcl] Other (See Comments)     Suicidal thoughts       Medications: Review discharge medications with patient and family and provide education.    Current Facility-Administered Medications   Medication Dose Route Frequency Provider Last Rate Last Admin    0.9%  NaCl infusion   Intravenous Continuous Vicente Espinoza MD        ceFAZolin 2 g in dextrose 5 % in water (D5W) 5 % 50 mL IVPB (MB+)  2 g Intravenous On Call Procedure Vicente Espinoza MD         Current Discharge Medication List        CONTINUE these medications which have NOT CHANGED    Details   ondansetron (ZOFRAN) 4 MG tablet Take 4 mg by mouth 2 (two) times daily.      pantoprazole (PROTONIX) 40 MG tablet Take 40 mg by mouth once daily.      cetirizine (ZYRTEC) 10 MG tablet Take 1 tablet (10 mg total) by mouth once daily.  Qty: 30 tablet, Refills: 0    Associated Diagnoses: Acute non-recurrent maxillary sinusitis      fluticasone propionate (FLONASE) 50 mcg/actuation nasal spray 1 spray (50 mcg total) by Each Nostril route 2 (two) times daily as needed.  Qty: 15 g, Refills: 0    Associated Diagnoses: Acute non-recurrent maxillary sinusitis      haloperidoL (HALDOL) 2 MG tablet Take 1 tablet (2 mg total) by mouth 3 (three) times daily as needed (Nausea).  Qty: 15 tablet, Refills: 0      hyoscyamine (LEVSIN/SL) 0.125 mg Subl Place 1 tablet (0.125 mg total) under the tongue every 4 (four) hours  as needed.  Qty: 180 tablet, Refills: 11      linaCLOtide (LINZESS) 72 mcg Cap capsule Take 1 capsule (72 mcg total) by mouth before breakfast.  Qty: 30 capsule, Refills: 11      metoclopramide HCl (REGLAN) 10 MG tablet Take 1 tablet (10 mg total) by mouth every 6 (six) hours as needed.  Qty: 12 tablet, Refills: 0      metoclopramide HCl (REGLAN) 5 mg/5 mL Soln Take 10 mLs (10 mg total) by mouth 3 (three) times daily before meals.  Qty: 210 mL, Refills: 0      phenylephrine-DM-guaiFENesin (DECONEX DMX) 10-17.5-400 mg Tab Take 1 tablet by mouth every 4 to 6 hours as needed (congestion/cough).  Qty: 20 tablet, Refills: 0    Associated Diagnoses: Acute non-recurrent maxillary sinusitis               I have seen and examined this patient within the last 30 days. My clinical findings that support the need for the home health skilled services and home bound status are the following:no   Patient with medication mismanagement issues requiring home bound status as evidenced by  gastroparesis and needs tuybe feeds.     Diet:   other NPO    Labs:  None    Referrals/ Consults   to evaluate for community resources/long-range planning.  Aide to provide assistance with personal care, ADLs, and vital signs.    Activities:   activity as tolerated    Nursing:   Agency to admit patient within 24 hours of hospital discharge unless specified on physician order or at patient request    SN to complete comprehensive assessment including routine vital signs. Instruct on disease process and s/s of complications to report to MD. Review/verify medication list sent home with the patient at time of discharge  and instruct patient/caregiver as needed. Frequency may be adjusted depending on start of care date.     Skilled nurse to perform up to 3 visits PRN for symptoms related to diagnosis    Notify MD if SBP > 160 or < 90; DBP > 90 or < 50; HR > 120 or < 50; Temp > 101; O2 < 88%; Other:       Ok to schedule additional visits based  on staff availability and patient request on consecutive days within the home health episode.    When multiple disciplines ordered:    Start of Care occurs on Sunday - Wednesday schedule remaining discipline evaluations as ordered on separate consecutive days following the start of care.    Thursday SOC -schedule subsequent evaluations Friday and Monday the following week.     Friday - Saturday SOC - schedule subsequent discipline evaluations on consecutive days starting Monday of the following week.    For all post-discharge communication and subsequent orders please contact patient's primary care physician. If unable to reach primary care physician or do not receive response within 30 minutes, please contact Dr. Durán's office at the [facility] for clinical staff order clarification    Miscellaneous   PEG Care:  Instruct patient/caregiver to clean site.  Monitor skin integrity.    Flush Gtube with 20 cc water daily  Flush Jtube with 20 cc water daily    TUBE FEEDS  [employer] Peptide 1.5 @ 20 ml/hr, slowly advance to goal rate of [employer] Peptide 1.5 @ 50 ml/hr   If [employer] Peptide not available, ok to have Impact peptide 1.5 or equivalent. Goals same as prior.       Home Health Aide:  Home Health Aide Other: as needed    Wound Care Orders  no    I certify that this patient is confined to her home and needs intermittent skilled nursing care.    KATIUSKA Weaver MD   General Surgery, PGY-5

## 2023-03-24 NOTE — ANESTHESIA PROCEDURE NOTES
BL TRISTON Single Shot    Patient location during procedure: OR   Block not for primary anesthetic.  Reason for block: at surgeon's request and post-op pain management   Post-op Pain Location: Abdominal pain   Start time: 3/24/2023 9:49 AM  Timeout: 3/24/2023 9:45 AM   End time: 3/24/2023 9:53 AM    Staffing  Authorizing Provider: Catina Martinez MD  Performing Provider: Diane Travis MD    Preanesthetic Checklist  Completed: patient identified, IV checked, site marked, risks and benefits discussed, surgical consent, monitors and equipment checked, pre-op evaluation and timeout performed  Peripheral Block  Patient position: sitting  Prep: ChloraPrep  Patient monitoring: heart rate, cardiac monitor, continuous pulse ox, continuous capnometry and frequent blood pressure checks  Block type: erector spinae plane  Laterality: bilateral  Injection technique: single shot  Interspace: T8-9    Needle  Needle type: Echogenic   Needle gauge: 20 G  Needle length: 4 in  Needle localization: anatomical landmarks and ultrasound guidance   -ultrasound image captured on disc.  Assessment  Injection assessment: negative aspiration, negative parasthesia and local visualized surrounding nerve  Paresthesia pain: none  Heart rate change: no  Slow fractionated injection: yes  Pain Tolerance: comfortable throughout block and no complaints  Medications:    Medications: bupivacaine (pf) (MARCAINE) injection 0.75% - Perineural   30 mL - 3/24/2023 9:53:00 AM    Additional Notes  Patient tolerated well.  See record for vitals.

## 2023-03-24 NOTE — ANESTHESIA POSTPROCEDURE EVALUATION
Anesthesia Post Evaluation    Patient: Tess Rodríguez    Procedure(s) Performed: Procedure(s) (LRB):  XI ROBOTIC JEJUNOSTOMY (N/A)  INSERTION, PEG TUBE    Final Anesthesia Type: general      Patient location during evaluation: PACU  Patient participation: Yes- Able to Participate  Level of consciousness: awake and alert and oriented  Post-procedure vital signs: reviewed and stable  Pain management: adequate  Airway patency: patent    PONV status at discharge: No PONV  Anesthetic complications: no      Cardiovascular status: blood pressure returned to baseline  Respiratory status: unassisted, room air and spontaneous ventilation  Hydration status: euvolemic  Follow-up not needed.          Vitals Value Taken Time   /82 03/24/23 1255   Temp 36.5 °C (97.7 °F) 03/24/23 1142   Pulse 51 03/24/23 1306   Resp 8 03/24/23 1306   SpO2 95 % 03/24/23 1306   Vitals shown include unvalidated device data.      No case tracking events are documented in the log.      Pain/Mary Ann Score: Pain Rating Prior to Med Admin: 10 (3/24/2023 11:57 AM)  Mary Ann Score: 10 (3/24/2023 12:45 PM)

## 2023-03-24 NOTE — CONSULTS
Brandyn Denson - Surgery  Adult Nutrition  Consult Note    SUMMARY     Recommendations    1) When medically able imitate Marisol [employer] Peptide 1.5 @ 20 ml/hr, slowly advance to goal rate of Marisol [employer] Peptide 1.5 @ 50 ml/hr   -Provides 1846 kcals, 89 g protein, 840 ml fluid   -Monitor tolerance     2) ADAT per Physician       3) RD following    Goals: Meet % een/epn by next rd f/u  Nutrition Goal Status: new  Communication of RD Recs: other (comment) (POC)    Assessment and Plan    Nutrition Problem:  Moderate/Severe Protein-Calorie Malnutrition  Malnutrition in the context of Chronic Illness/Injury    Related to (etiology):  Gastroparesis     Signs and Symptoms (as evidenced by):    Muscle Mass Depletion: severe depletion of temples, clavicle region, scapular region, interosseous muscle, and lower extremities   Weight Loss: 10% x 6 months (33% in 6 months)     Interventions(treatment strategy):  Collaboration with other providers  EN    Nutrition Diagnosis Status:  New        Malnutrition Assessment  Malnutrition Type: chronic illness      Micronutrient Evaluation: no deficiencies   Weight Loss (Malnutrition): 10% in 6 months (33% wt loss in 6 months)  Muscle Mass (Malnutrition): moderate depletion   Orbital Region (Subcutaneous Fat Loss): well nourished  Upper Arm Region (Subcutaneous Fat Loss): mild depletion   Jew Region (Muscle Loss): well nourished  Clavicle Bone Region (Muscle Loss): moderate depletion  Clavicle and Acromion Bone Region (Muscle Loss): mild depletion  Scapular Bone Region (Muscle Loss): moderate depletion  Dorsal Hand (Muscle Loss): severe depletion  Patellar Region (Muscle Loss): mild depletion  Anterior Thigh Region (Muscle Loss): moderate depletion  Posterior Calf Region (Muscle Loss): moderate depletion   Edema (Fluid Accumulation): 0-->no edema present   Subcutaneous Fat Loss (Final Summary): well nourished  Muscle Loss Evaluation (Final Summary): severe protein-calorie malnutrition   "  Severe Weight Loss (Malnutrition): greater than 10% in 6 months    Reason for Assessment    Reason For Assessment: consult  Diagnosis: gastrointestinal disease (J tube placement)  Relevant Medical History: gerd and gastroparesis  Interdisciplinary Rounds: did not attend  General Information Comments: RD consulted for J Tube Recs. Pt seen at bedside with family member. Denies N/V/D/C, chewing/swallowing issues. LBM 3/23. States she was eating 1 solid meal per week last week and liquids (including Ensure). # (2022) - #. (33% wt loss in 6 months, significant) NFPE completed 3/24, noted with moderate muscle loss. Meets ASPEN criteria for chronic severe malnutrition. RD following  Nutrition Discharge Planning: pending medical course    Nutrition Risk Screen    Nutrition Risk Screen: unintentional loss of 10 lbs or more in the past 2 months    Nutrition/Diet History    Patient Reported Diet/Restrictions/Preferences: other (see comments) (mostly liquid)  Typical Food/Fluid Intake: 1 solid meal per week last week and liquids.  Spiritual, Cultural Beliefs, Evangelical Practices, Values that Affect Care: no  Supplemental Drinks or Food Habits: Ensure Enlive  Food Allergies: NKFA    Anthropometrics    Temp: 97 °F (36.1 °C)  Height Method: Stated  Height: 5' 5" (165.1 cm)  Height (inches): 65 in  Weight Method: Standard Scale  Weight: 63.5 kg (140 lb)  Weight (lb): 140 lb  Ideal Body Weight (IBW), Female: 125 lb  % Ideal Body Weight, Female (lb): 112 %  BMI (Calculated): 23.3  Usual Body Weight (UBW), k.45 kg  % Usual Body Weight: 66.67  % Weight Change From Usual Weight: -33.47 %       Lab/Procedures/Meds    Pertinent Labs Reviewed: reviewed  Pertinent Labs Comments: ALT: 9  Pertinent Medications Reviewed: reviewed  Pertinent Medications Comments: gabapentin, pantoprazole, D5    Estimated/Assessed Needs    Weight Used For Calorie Calculations: 63.5 kg (140 lb)  Energy Calorie Requirements (kcal): " 3985-6678 (25-30 kcal/kg of actual BW)  Energy Need Method: Kcal/kg  Protein Requirements: 76-82 (1.2-1.3 g/kg of actual BW)  Weight Used For Protein Calculations: 63.5 kg (140 lb)        RDA Method (mL): 1587         Nutrition Prescription Ordered    Current Diet Order: NPO    Evaluation of Received Nutrient/Fluid Intake    I/O: N/A  Comments: LBM 3/23  % Intake of Estimated Energy Needs: 0 - 25 %  % Meal Intake: NPO    Nutrition Risk    Level of Risk/Frequency of Follow-up: low (f/u: 1x/week)       Monitor and Evaluation    Food and Nutrient Intake: enteral nutrition intake  Food and Nutrient Adminstration: enteral and parenteral nutrition administration  Knowledge/Beliefs/Attitudes: food and nutrition knowledge/skill, beliefs and attitudes  Physical Activity and Function: nutrition-related ADLs and IADLs  Anthropometric Measurements: height/length, weight, weight change, body mass index  Biochemical Data, Medical Tests and Procedures: electrolyte and renal panel, gastrointestinal profile, inflammatory profile, lipid profile, glucose/endocrine profile  Nutrition-Focused Physical Findings: overall appearance, extremities, muscles and bones, head and eyes, skin       Nutrition Follow-Up    RD Follow-up?: Yes    Kristina Lawler, Registration Eligible, Provisional LDN

## 2023-03-24 NOTE — ANESTHESIA PROCEDURE NOTES
Intubation    Date/Time: 3/24/2023 9:58 AM  Performed by: Juan Interiano MD  Authorized by: Jesse Love MD     Intubation:     Induction:  Rapid sequence induction    Intubated:  Postinduction    Mask Ventilation:  Not attempted    Attempts:  1    Attempted By:  Resident anesthesiologist    Method of Intubation:  Video laryngoscopy    Blade:  Thomas 3    Laryngeal View Grade: Grade I - full view of cords      Difficult Airway Encountered?: No      Complications:  None    Airway Device:  Oral endotracheal tube    Airway Device Size:  7.0    Style/Cuff Inflation:  Cuffed (inflated to minimal occlusive pressure)    Tube secured:  23    Secured at:  The lips    Placement Verified By:  Capnometry    Complicating Factors:  None    Findings Post-Intubation:  BS equal bilateral and atraumatic/condition of teeth unchanged

## 2023-03-25 PROCEDURE — C9113 INJ PANTOPRAZOLE SODIUM, VIA: HCPCS | Performed by: STUDENT IN AN ORGANIZED HEALTH CARE EDUCATION/TRAINING PROGRAM

## 2023-03-25 PROCEDURE — 25000003 PHARM REV CODE 250: Performed by: STUDENT IN AN ORGANIZED HEALTH CARE EDUCATION/TRAINING PROGRAM

## 2023-03-25 PROCEDURE — 63600175 PHARM REV CODE 636 W HCPCS: Performed by: STUDENT IN AN ORGANIZED HEALTH CARE EDUCATION/TRAINING PROGRAM

## 2023-03-25 PROCEDURE — 94761 N-INVAS EAR/PLS OXIMETRY MLT: CPT

## 2023-03-25 RX ORDER — IBUPROFEN 600 MG/1
600 TABLET ORAL EVERY 6 HOURS PRN
Status: ON HOLD | COMMUNITY
Start: 2023-03-25 | End: 2023-05-20 | Stop reason: HOSPADM

## 2023-03-25 RX ORDER — ACETAMINOPHEN 500 MG
500 TABLET ORAL EVERY 6 HOURS PRN
Refills: 0 | COMMUNITY
Start: 2023-03-25

## 2023-03-25 RX ORDER — GABAPENTIN 250 MG/5ML
250 SOLUTION ORAL EVERY 8 HOURS
Status: DISCONTINUED | OUTPATIENT
Start: 2023-03-25 | End: 2023-03-26 | Stop reason: HOSPADM

## 2023-03-25 RX ORDER — PANTOPRAZOLE SODIUM 40 MG/10ML
40 INJECTION, POWDER, LYOPHILIZED, FOR SOLUTION INTRAVENOUS DAILY
Status: DISCONTINUED | OUTPATIENT
Start: 2023-03-25 | End: 2023-03-26 | Stop reason: HOSPADM

## 2023-03-25 RX ORDER — ACETAMINOPHEN 650 MG/20.3ML
650 LIQUID ORAL EVERY 6 HOURS
Status: DISCONTINUED | OUTPATIENT
Start: 2023-03-25 | End: 2023-03-26 | Stop reason: HOSPADM

## 2023-03-25 RX ADMIN — GABAPENTIN 250 MG: 250 SOLUTION ORAL at 10:03

## 2023-03-25 RX ADMIN — ACETAMINOPHEN 650 MG: 160 SOLUTION ORAL at 05:03

## 2023-03-25 RX ADMIN — METHOCARBAMOL 500 MG: 100 INJECTION, SOLUTION INTRAMUSCULAR; INTRAVENOUS at 06:03

## 2023-03-25 RX ADMIN — KETOROLAC TROMETHAMINE 15 MG: 15 INJECTION, SOLUTION INTRAMUSCULAR; INTRAVENOUS at 10:03

## 2023-03-25 RX ADMIN — ACETAMINOPHEN 650 MG: 160 SOLUTION ORAL at 01:03

## 2023-03-25 RX ADMIN — KETOROLAC TROMETHAMINE 15 MG: 15 INJECTION, SOLUTION INTRAMUSCULAR; INTRAVENOUS at 06:03

## 2023-03-25 RX ADMIN — DEXTROSE, SODIUM CHLORIDE, AND POTASSIUM CHLORIDE: 5; .45; .15 INJECTION INTRAVENOUS at 09:03

## 2023-03-25 RX ADMIN — METHOCARBAMOL 500 MG: 100 INJECTION, SOLUTION INTRAMUSCULAR; INTRAVENOUS at 12:03

## 2023-03-25 RX ADMIN — METHOCARBAMOL 500 MG: 100 INJECTION, SOLUTION INTRAMUSCULAR; INTRAVENOUS at 10:03

## 2023-03-25 RX ADMIN — GABAPENTIN 250 MG: 250 SOLUTION ORAL at 01:03

## 2023-03-25 RX ADMIN — KETOROLAC TROMETHAMINE 15 MG: 15 INJECTION, SOLUTION INTRAMUSCULAR; INTRAVENOUS at 05:03

## 2023-03-25 RX ADMIN — ACETAMINOPHEN 650 MG: 160 SOLUTION ORAL at 10:03

## 2023-03-25 RX ADMIN — PANTOPRAZOLE SODIUM 40 MG: 40 INJECTION, POWDER, FOR SOLUTION INTRAVENOUS at 09:03

## 2023-03-25 RX ADMIN — METHOCARBAMOL 500 MG: 100 INJECTION, SOLUTION INTRAMUSCULAR; INTRAVENOUS at 05:03

## 2023-03-25 RX ADMIN — KETOROLAC TROMETHAMINE 15 MG: 15 INJECTION, SOLUTION INTRAMUSCULAR; INTRAVENOUS at 11:03

## 2023-03-25 NOTE — DISCHARGE INSTRUCTIONS
You have dermabond covering your incision. This purple glue will come off on its own in 10-14 days. Do not submerge yourself in water. You may shower, letting water run over the incision. Do not scrub. Pat dry.    No lifting greater than weight of a jug of milk for four weeks.    Drain Gtube into bag or toilet for nausea/emesis    Do not put crushed meds into the Jtube. This will clog the Jtube.     Flush the G and J tube daily with 20 cc of water.    Advance tube feeds to a goal of 50 cc/hr running for 24 hours.    You need to be on goal tube feeds for 2 weeks before we can consider gastroparesis surgery    You need to be off all narcotic medication for three months before we can do gastroparesis surgery

## 2023-03-25 NOTE — NURSING
Tube feed has been infusing at goal 50mL for an hour and pt is tolerating very well. No c/o of pain or nausea.

## 2023-03-25 NOTE — PLAN OF CARE
ANGELICA contacted Farhan from [facility] via secure chat. Pt unable to discharge until tolerating feeds for 24 hrs. Farhan aware, and following. Will provide supplies at bedside.     Pt also has TPN needs with [facility] in [place] [phone].         Vaishnavi Schwartz LMSW   Pediatric/PICU    [facility]  [phone]

## 2023-03-25 NOTE — PLAN OF CARE
VSS. Pt a/ox4 and turns independently in bed. J tube and PEG tube c/d/I. PRN pain meds given. Pt nauseous and unclamped peg TUBE. Nausea symptoms relieved. No other significant events during shift.  at bedside throughout shift. Bed in lowest position with call light within reach. Pt currently resting comfortably in bed.   Problem: Adult Inpatient Plan of Care  Goal: Plan of Care Review  Outcome: Ongoing, Progressing  Goal: Patient-Specific Goal (Individualized)  Outcome: Ongoing, Progressing  Goal: Absence of Hospital-Acquired Illness or Injury  Outcome: Ongoing, Progressing  Goal: Optimal Comfort and Wellbeing  Outcome: Ongoing, Progressing  Goal: Readiness for Transition of Care  Outcome: Ongoing, Progressing     Problem: Fall Injury Risk  Goal: Absence of Fall and Fall-Related Injury  Outcome: Ongoing, Progressing

## 2023-03-25 NOTE — PLAN OF CARE
[facility]    Orders noted for Marisol [employer] Peptide 1.5@ 20 ml/hr to slowly advance to 50 ml/hr via J-tube. Patient will need to be on tube feeds for 24 hours prior to discharge to confirm tolerance. Informed Vaishnavi DOMINGUEZ via secure chat. Called and spoke with patient and informed her. Will call her tomorrow for education and tube feed delivery.     [facility]  Farhan Jean Rn, Clinical Educator  Cell [phone]  Office [phone]  Fax [phone]

## 2023-03-25 NOTE — PROGRESS NOTES
Paged [place] regarding verifying tube feeding should be started in J-tube and NOT PEG tube. Per MD on call, ok to start tube feeding on J tube and not PEG tube. Impact peptide 1.5 tube feeding started in J tube at 20cc/hr per order.

## 2023-03-25 NOTE — PROGRESS NOTES
Notified on call gen surg pain 6/10 and pt NPO. MD currently in surgery but spoke with circulating nurse. Circulating nurse delegated order from Jonathan García. Dilaudid 0.2mg IV for PRN pain.

## 2023-03-25 NOTE — DISCHARGE SUMMARY
Brandyn angella - Surgery  General Surgery  Discharge Summary      Patient Name: Tess Rodríguez  MRN: [id number]  Admission Date: 3/24/2023  Hospital Length of Stay: 0 days  Discharge Date and Time:  03/25/2023 9:30 AM  Attending Physician: Kelvin Durán MD   Discharging Provider: VINCE Jordan MD  Primary Care Provider: Eusebio Escamilla MD    HPI:   No notes on file    Procedure(s) (LRB):  XI ROBOTIC JEJUNOSTOMY (N/A)  INSERTION, PEG TUBE      Indwelling Lines/Drains at time of discharge:   Lines/Drains/Airways     Drain  Duration                Gastrostomy/Enterostomy 03/24/23 Jejunostomy tube LUQ 1 day         Gastrostomy/Enterostomy 03/24/23 1109 Percutaneous endoscopic gastrostomy (PEG) LUQ feeding <1 day              Hospital Course:   Robotic G and J tubes  Went well  Started tube feeds on POD1  HH setup  Discharged    Physical Exam:  General: well-appearing, well nourished, afebrile  Neuro: AAOx3  Cardiac: normal rate; good perfusion  Pulm: unlabored; normal effort  Abdomen: soft, appropriately tender; G and J tube in place  Extremities: no edema      Goals of Care Treatment Preferences:  Code Status: Full Code      Consults:   Consults (From admission, onward)        Status Ordering Provider     Inpatient consult to Social Work  Once        Provider:  (Not yet assigned)    Acknowledged KATIUSKA JORDAN     Inpatient consult to Registered Dietitian/Nutritionist  Once        Provider:  (Not yet assigned)    Completed KATIUSKA JORDAN          Significant Diagnostic Studies: see chart    Pending Diagnostic Studies:     None        There are no hospital problems to display for this patient.     Discharged Condition: good    Disposition: Home or Self Care    Follow Up:    Patient Instructions:      Lifting restrictions   Order Comments: No lifting greater than weight of a jug of milk for four weeks.     No dressing needed   Order Comments: You have dermabond covering your incision. This purple glue will  come off on its own in 10-14 days. Do not submerge yourself in water. You may shower, letting water run over the incision. Do not scrub. Pat dry.     Notify your health care provider if you experience any of the following:  temperature >100.4     Notify your health care provider if you experience any of the following:  persistent nausea and vomiting or diarrhea     Notify your health care provider if you experience any of the following:  redness, tenderness, or signs of infection (pain, swelling, redness, odor or green/yellow discharge around incision site)     Notify your health care provider if you experience any of the following:  difficulty breathing or increased cough     Notify your health care provider if you experience any of the following:  severe persistent headache     Notify your health care provider if you experience any of the following:  worsening rash     Notify your health care provider if you experience any of the following:  persistent dizziness, light-headedness, or visual disturbances     Notify your health care provider if you experience any of the following:  increased confusion or weakness     Notify your health care provider if you experience any of the following:     Activity as tolerated     Medications:  Reconciled Home Medications:      Medication List      START taking these medications    acetaminophen 500 MG tablet  Commonly known as: TYLENOL  Take 1 tablet (500 mg total) by mouth every 6 (six) hours as needed for Pain.     ibuprofen 600 MG tablet  Commonly known as: ADVIL,MOTRIN  Take 1 tablet (600 mg total) by mouth every 6 (six) hours as needed for Pain.        CONTINUE taking these medications    cetirizine 10 MG tablet  Commonly known as: ZYRTEC  Take 1 tablet (10 mg total) by mouth once daily.     DECONEX DMX 10-17.5-400 mg Tab  Generic drug: phenylephrine-DM-guaiFENesin  Take 1 tablet by mouth every 4 to 6 hours as needed (congestion/cough).     fluticasone propionate 50  mcg/actuation nasal spray  Commonly known as: FLONASE  1 spray (50 mcg total) by Each Nostril route 2 (two) times daily as needed.     haloperidoL 2 MG tablet  Commonly known as: HALDOL  Take 1 tablet (2 mg total) by mouth 3 (three) times daily as needed (Nausea).     hyoscyamine 0.125 mg Subl  Commonly known as: LEVSIN/SL  Place 1 tablet (0.125 mg total) under the tongue every 4 (four) hours as needed.     linaCLOtide 72 mcg Cap capsule  Commonly known as: LINZESS  Take 1 capsule (72 mcg total) by mouth before breakfast.     * metoclopramide HCl 5 mg/5 mL Soln  Commonly known as: REGLAN  Take 10 mLs (10 mg total) by mouth 3 (three) times daily before meals.     * metoclopramide HCl 10 MG tablet  Commonly known as: REGLAN  Take 1 tablet (10 mg total) by mouth every 6 (six) hours as needed.     ondansetron 4 MG tablet  Commonly known as: ZOFRAN  Take 4 mg by mouth 2 (two) times daily.     pantoprazole 40 MG tablet  Commonly known as: PROTONIX  Take 40 mg by mouth once daily.         * This list has 2 medication(s) that are the same as other medications prescribed for you. Read the directions carefully, and ask your doctor or other care provider to review them with you.              Time spent on the discharge of patient: 15 minutes    VINCE Weaver MD  General Surgery  [facility] - Surgery

## 2023-03-26 VITALS
BODY MASS INDEX: 23.32 KG/M2 | TEMPERATURE: 98 F | RESPIRATION RATE: 16 BRPM | SYSTOLIC BLOOD PRESSURE: 106 MMHG | OXYGEN SATURATION: 99 % | HEIGHT: 65 IN | DIASTOLIC BLOOD PRESSURE: 60 MMHG | HEART RATE: 68 BPM | WEIGHT: 140 LBS

## 2023-03-26 LAB
ALBUMIN SERPL BCP-MCNC: 3.6 G/DL (ref 3.5–5.2)
ALP SERPL-CCNC: 34 U/L (ref 55–135)
ALT SERPL W/O P-5'-P-CCNC: 16 U/L (ref 10–44)
ANION GAP SERPL CALC-SCNC: 7 MMOL/L (ref 8–16)
AST SERPL-CCNC: 17 U/L (ref 10–40)
BASOPHILS # BLD AUTO: 0.01 K/UL (ref 0–0.2)
BASOPHILS NFR BLD: 0.2 % (ref 0–1.9)
BILIRUB SERPL-MCNC: 0.3 MG/DL (ref 0.1–1)
BUN SERPL-MCNC: 12 MG/DL (ref 6–20)
CALCIUM SERPL-MCNC: 8.5 MG/DL (ref 8.7–10.5)
CHLORIDE SERPL-SCNC: 110 MMOL/L (ref 95–110)
CO2 SERPL-SCNC: 24 MMOL/L (ref 23–29)
CREAT SERPL-MCNC: 0.7 MG/DL (ref 0.5–1.4)
DIFFERENTIAL METHOD: ABNORMAL
EOSINOPHIL # BLD AUTO: 0 K/UL (ref 0–0.5)
EOSINOPHIL NFR BLD: 0.6 % (ref 0–8)
ERYTHROCYTE [DISTWIDTH] IN BLOOD BY AUTOMATED COUNT: 13.4 % (ref 11.5–14.5)
EST. GFR  (NO RACE VARIABLE): >60 ML/MIN/1.73 M^2
GLUCOSE SERPL-MCNC: 95 MG/DL (ref 70–110)
HCT VFR BLD AUTO: 38.3 % (ref 37–48.5)
HGB BLD-MCNC: 12.1 G/DL (ref 12–16)
IMM GRANULOCYTES # BLD AUTO: 0.02 K/UL (ref 0–0.04)
IMM GRANULOCYTES NFR BLD AUTO: 0.3 % (ref 0–0.5)
LYMPHOCYTES # BLD AUTO: 1.4 K/UL (ref 1–4.8)
LYMPHOCYTES NFR BLD: 20.3 % (ref 18–48)
MAGNESIUM SERPL-MCNC: 1.9 MG/DL (ref 1.6–2.6)
MCH RBC QN AUTO: 29.9 PG (ref 27–31)
MCHC RBC AUTO-ENTMCNC: 31.6 G/DL (ref 32–36)
MCV RBC AUTO: 95 FL (ref 82–98)
MONOCYTES # BLD AUTO: 0.4 K/UL (ref 0.3–1)
MONOCYTES NFR BLD: 5.7 % (ref 4–15)
NEUTROPHILS # BLD AUTO: 4.8 K/UL (ref 1.8–7.7)
NEUTROPHILS NFR BLD: 72.9 % (ref 38–73)
NRBC BLD-RTO: 0 /100 WBC
PHOSPHATE SERPL-MCNC: 2.7 MG/DL (ref 2.7–4.5)
PLATELET # BLD AUTO: 159 K/UL (ref 150–450)
PMV BLD AUTO: 11.6 FL (ref 9.2–12.9)
POTASSIUM SERPL-SCNC: 4.3 MMOL/L (ref 3.5–5.1)
PROT SERPL-MCNC: 6.2 G/DL (ref 6–8.4)
RBC # BLD AUTO: 4.05 M/UL (ref 4–5.4)
SODIUM SERPL-SCNC: 141 MMOL/L (ref 136–145)
WBC # BLD AUTO: 6.64 K/UL (ref 3.9–12.7)

## 2023-03-26 PROCEDURE — 80053 COMPREHEN METABOLIC PANEL: CPT | Performed by: STUDENT IN AN ORGANIZED HEALTH CARE EDUCATION/TRAINING PROGRAM

## 2023-03-26 PROCEDURE — 36415 COLL VENOUS BLD VENIPUNCTURE: CPT | Performed by: STUDENT IN AN ORGANIZED HEALTH CARE EDUCATION/TRAINING PROGRAM

## 2023-03-26 PROCEDURE — 83735 ASSAY OF MAGNESIUM: CPT | Performed by: STUDENT IN AN ORGANIZED HEALTH CARE EDUCATION/TRAINING PROGRAM

## 2023-03-26 PROCEDURE — 85025 COMPLETE CBC W/AUTO DIFF WBC: CPT | Performed by: STUDENT IN AN ORGANIZED HEALTH CARE EDUCATION/TRAINING PROGRAM

## 2023-03-26 PROCEDURE — 25000003 PHARM REV CODE 250: Performed by: STUDENT IN AN ORGANIZED HEALTH CARE EDUCATION/TRAINING PROGRAM

## 2023-03-26 PROCEDURE — 63600175 PHARM REV CODE 636 W HCPCS: Performed by: STUDENT IN AN ORGANIZED HEALTH CARE EDUCATION/TRAINING PROGRAM

## 2023-03-26 PROCEDURE — 84100 ASSAY OF PHOSPHORUS: CPT | Performed by: STUDENT IN AN ORGANIZED HEALTH CARE EDUCATION/TRAINING PROGRAM

## 2023-03-26 PROCEDURE — C9113 INJ PANTOPRAZOLE SODIUM, VIA: HCPCS | Performed by: STUDENT IN AN ORGANIZED HEALTH CARE EDUCATION/TRAINING PROGRAM

## 2023-03-26 RX ADMIN — PANTOPRAZOLE SODIUM 40 MG: 40 INJECTION, POWDER, FOR SOLUTION INTRAVENOUS at 09:03

## 2023-03-26 RX ADMIN — ONDANSETRON 4 MG: 2 INJECTION INTRAMUSCULAR; INTRAVENOUS at 10:03

## 2023-03-26 RX ADMIN — ACETAMINOPHEN 650 MG: 160 SOLUTION ORAL at 05:03

## 2023-03-26 RX ADMIN — ACETAMINOPHEN 650 MG: 160 SOLUTION ORAL at 11:03

## 2023-03-26 RX ADMIN — METHOCARBAMOL 500 MG: 100 INJECTION, SOLUTION INTRAMUSCULAR; INTRAVENOUS at 05:03

## 2023-03-26 RX ADMIN — KETOROLAC TROMETHAMINE 15 MG: 15 INJECTION, SOLUTION INTRAMUSCULAR; INTRAVENOUS at 11:03

## 2023-03-26 RX ADMIN — KETOROLAC TROMETHAMINE 15 MG: 15 INJECTION, SOLUTION INTRAMUSCULAR; INTRAVENOUS at 05:03

## 2023-03-26 RX ADMIN — METHOCARBAMOL 500 MG: 100 INJECTION, SOLUTION INTRAMUSCULAR; INTRAVENOUS at 12:03

## 2023-03-26 RX ADMIN — GABAPENTIN 250 MG: 250 SOLUTION ORAL at 05:03

## 2023-03-26 RX ADMIN — DEXTROSE, SODIUM CHLORIDE, AND POTASSIUM CHLORIDE: 5; .45; .15 INJECTION INTRAVENOUS at 05:03

## 2023-03-26 NOTE — PLAN OF CARE
[facility]    Spoke with patient and informed her will make delivery to room and educate patient today.   She is agreeable.     [facility]  Farhan Jean Rn, Clinical Educator  Cell [phone]  Office [phone]  Fax [phone]

## 2023-03-26 NOTE — DISCHARGE SUMMARY
[facility] - Surgery  General Surgery  Discharge Summary      Patient Name: Tess Rodríguez  MRN: [id number]  Admission Date: 3/24/2023  Hospital Length of Stay: 0 days  Discharge Date and Time:  03/26/2023 9:13 AM  Attending Physician: Kelvin Durán MD   Discharging Provider: VINCE Jordan MD  Primary Care Provider: Eusebio Escamilla MD    HPI:   Gastroparesis with malnutrition  Needs optimization in advance of targeted surgical therapy for gastroparesis    Procedure(s) (LRB):  XI ROBOTIC JEJUNOSTOMY (N/A)  INSERTION, PEG TUBE      Indwelling Lines/Drains at time of discharge:   Lines/Drains/Airways       Drain  Duration                  Gastrostomy/Enterostomy 03/24/23 Jejunostomy tube LUQ 2 days         Gastrostomy/Enterostomy 03/24/23 1109 Percutaneous endoscopic gastrostomy (PEG) LUQ feeding 1 day                  Hospital Course: Robotic G and J tubes  Went well  Started tube feeds on POD1  HH setup  DME provider insisted she tolerate tube feeds for 24 hours prior to discharge so she stayed another night  Pain was reasonably well controlled  Discharged on POD2    Physical Exam:  General: well-appearing, well nourished, afebrile  Neuro: AAOx3  Cardiac: normal rate; good perfusion  Pulm: unlabored; normal effort  Abdomen: soft, appropriately tender, G and J tubes, incisions are clean, dry and intact  Extremities: no edema      Goals of Care Treatment Preferences:  Code Status: Full Code      Consults:   Consults (From admission, onward)          Status Ordering Provider     Inpatient consult to Social Work  Once        Provider:  (Not yet assigned)    Acknowledged KATIUSKA JORDAN     Inpatient consult to Registered Dietitian/Nutritionist  Once        Provider:  (Not yet assigned)    Completed KATIUSKA JORDAN            Significant Diagnostic Studies: see chart    Pending Diagnostic Studies:       Procedure Component Value Units Date/Time    CBC Auto Differential [[id number]]     Order Status: Sent Lab  Status: No result     Specimen: Blood     Comprehensive Metabolic Panel [[id number]]     Order Status: Sent Lab Status: No result     Specimen: Blood     Magnesium [[id number]]     Order Status: Sent Lab Status: No result     Specimen: Blood     Phosphorus [[id number]]     Order Status: Sent Lab Status: No result     Specimen: Blood           There are no hospital problems to display for this patient.     Discharged Condition: good    Disposition: Home-Health Care [facility] [place]    Follow Up:    Patient Instructions:      Lifting restrictions   Order Comments: No lifting greater than weight of a jug of milk for four weeks.     No dressing needed   Order Comments: You have dermabond covering your incision. This purple glue will come off on its own in 10-14 days. Do not submerge yourself in water. You may shower, letting water run over the incision. Do not scrub. Pat dry.     Notify your health care provider if you experience any of the following:  temperature >100.4     Notify your health care provider if you experience any of the following:  persistent nausea and vomiting or diarrhea     Notify your health care provider if you experience any of the following:  redness, tenderness, or signs of infection (pain, swelling, redness, odor or green/yellow discharge around incision site)     Notify your health care provider if you experience any of the following:  difficulty breathing or increased cough     Notify your health care provider if you experience any of the following:  severe persistent headache     Notify your health care provider if you experience any of the following:  worsening rash     Notify your health care provider if you experience any of the following:  persistent dizziness, light-headedness, or visual disturbances     Notify your health care provider if you experience any of the following:  increased confusion or weakness     Notify your health care provider if you experience any of the following:     Activity as  tolerated     Medications:  Reconciled Home Medications:      Medication List        START taking these medications      acetaminophen 500 MG tablet  Commonly known as: TYLENOL  Take 1 tablet (500 mg total) by mouth every 6 (six) hours as needed for Pain.     ibuprofen 600 MG tablet  Commonly known as: ADVIL,MOTRIN  Take 1 tablet (600 mg total) by mouth every 6 (six) hours as needed for Pain.            CONTINUE taking these medications      cetirizine 10 MG tablet  Commonly known as: ZYRTEC  Take 1 tablet (10 mg total) by mouth once daily.     DECONEX DMX 10-17.5-400 mg Tab  Generic drug: phenylephrine-DM-guaiFENesin  Take 1 tablet by mouth every 4 to 6 hours as needed (congestion/cough).     fluticasone propionate 50 mcg/actuation nasal spray  Commonly known as: FLONASE  1 spray (50 mcg total) by Each Nostril route 2 (two) times daily as needed.     haloperidoL 2 MG tablet  Commonly known as: HALDOL  Take 1 tablet (2 mg total) by mouth 3 (three) times daily as needed (Nausea).     hyoscyamine 0.125 mg Subl  Commonly known as: LEVSIN/SL  Place 1 tablet (0.125 mg total) under the tongue every 4 (four) hours as needed.     linaCLOtide 72 mcg Cap capsule  Commonly known as: LINZESS  Take 1 capsule (72 mcg total) by mouth before breakfast.     * metoclopramide HCl 5 mg/5 mL Soln  Commonly known as: REGLAN  Take 10 mLs (10 mg total) by mouth 3 (three) times daily before meals.     * metoclopramide HCl 10 MG tablet  Commonly known as: REGLAN  Take 1 tablet (10 mg total) by mouth every 6 (six) hours as needed.     ondansetron 4 MG tablet  Commonly known as: ZOFRAN  Take 4 mg by mouth 2 (two) times daily.     pantoprazole 40 MG tablet  Commonly known as: PROTONIX  Take 40 mg by mouth once daily.           * This list has 2 medication(s) that are the same as other medications prescribed for you. Read the directions carefully, and ask your doctor or other care provider to review them with you.                Time spent on  the discharge of patient: 15 minutes    VINCE Weaver MD  General Surgery  [facility] - Surgery

## 2023-03-26 NOTE — PLAN OF CARE
VSS> pt a/ox4 and turns independently in bed. Pt ambulatory. Continuous tube feeding monitored. No significant events during shift. Bed in lowest position with call light within reach. Pt currently resting comfortably in bed.  Problem: Adult Inpatient Plan of Care  Goal: Plan of Care Review  Outcome: Ongoing, Progressing  Goal: Patient-Specific Goal (Individualized)  Outcome: Ongoing, Progressing  Goal: Absence of Hospital-Acquired Illness or Injury  Outcome: Ongoing, Progressing  Goal: Optimal Comfort and Wellbeing  Outcome: Ongoing, Progressing  Goal: Readiness for Transition of Care  Outcome: Ongoing, Progressing     Problem: Fall Injury Risk  Goal: Absence of Fall and Fall-Related Injury  Outcome: Ongoing, Progressing

## 2023-03-26 NOTE — PLAN OF CARE
[facility]     Patient educated on Kangaroo Ari pump for tube feeds. Patient is comfortable with administration. Delivery made to bedside. Patient education checklist complete.24/7 Pharmacy number provided. Patient nurse notified of above. No further infusion needs noted.         [facility]  Farhan Jean Rn, Clinical Educator  Cell [phone]  Office [phone]  Fax [phone]

## 2023-03-26 NOTE — HOSPITAL COURSE
Robotic G and J tubes  Went well  Started tube feeds on POD1  HH setup  DME provider insisted she tolerate tube feeds for 24 hours prior to discharge so she stayed another night  Pain was reasonably well controlled  Discharged on POD2

## 2023-03-27 PROCEDURE — G0180 MD CERTIFICATION HHA PATIENT: HCPCS | Mod: ,,, | Performed by: SURGERY

## 2023-03-27 PROCEDURE — G0180 PR HOME HEALTH MD CERTIFICATION: ICD-10-PCS | Mod: ,,, | Performed by: SURGERY

## 2023-04-03 ENCOUNTER — PATIENT MESSAGE (OUTPATIENT)
Dept: SURGERY | Facility: CLINIC | Age: 28
End: 2023-04-03
Payer: MEDICAID

## 2023-04-05 ENCOUNTER — HOSPITAL ENCOUNTER (EMERGENCY)
Facility: HOSPITAL | Age: 28
Discharge: HOME | End: 2023-04-05
Attending: EMERGENCY MEDICINE
Payer: MEDICAID

## 2023-04-05 VITALS
OXYGEN SATURATION: 95 % | TEMPERATURE: 98 F | DIASTOLIC BLOOD PRESSURE: 69 MMHG | HEART RATE: 90 BPM | SYSTOLIC BLOOD PRESSURE: 106 MMHG | RESPIRATION RATE: 30 BRPM

## 2023-04-05 DIAGNOSIS — R10.9 ABDOMINAL PAIN: ICD-10-CM

## 2023-04-05 LAB
ALBUMIN SERPL BCP-MCNC: 3.6 G/DL (ref 3.5–5.2)
ALP SERPL-CCNC: 50 U/L (ref 55–135)
ALT SERPL W/O P-5'-P-CCNC: 6 U/L (ref 10–44)
AMYLASE SERPL-CCNC: 63 U/L (ref 20–110)
ANION GAP SERPL CALC-SCNC: 12 MMOL/L (ref 8–16)
AST SERPL-CCNC: 13 U/L (ref 10–40)
BASOPHILS # BLD AUTO: 0.02 K/UL (ref 0–0.2)
BASOPHILS NFR BLD: 0.2 % (ref 0–1.9)
BILIRUB SERPL-MCNC: 0.3 MG/DL (ref 0.1–1)
BUN SERPL-MCNC: 9 MG/DL (ref 6–20)
CALCIUM SERPL-MCNC: 9 MG/DL (ref 8.7–10.5)
CHLORIDE SERPL-SCNC: 101 MMOL/L (ref 95–110)
CO2 SERPL-SCNC: 28 MMOL/L (ref 23–29)
CREAT SERPL-MCNC: 0.7 MG/DL (ref 0.5–1.4)
DIFFERENTIAL METHOD: ABNORMAL
EOSINOPHIL # BLD AUTO: 0.1 K/UL (ref 0–0.5)
EOSINOPHIL NFR BLD: 0.6 % (ref 0–8)
ERYTHROCYTE [DISTWIDTH] IN BLOOD BY AUTOMATED COUNT: 12.9 % (ref 11.5–14.5)
EST. GFR  (NO RACE VARIABLE): >60 ML/MIN/1.73 M^2
GLUCOSE SERPL-MCNC: 86 MG/DL (ref 70–110)
HCT VFR BLD AUTO: 38.2 % (ref 37–48.5)
HGB BLD-MCNC: 12.7 G/DL (ref 12–16)
IMM GRANULOCYTES # BLD AUTO: 0.02 K/UL (ref 0–0.04)
IMM GRANULOCYTES NFR BLD AUTO: 0.2 % (ref 0–0.5)
LIPASE SERPL-CCNC: 15 U/L (ref 4–60)
LYMPHOCYTES # BLD AUTO: 1.5 K/UL (ref 1–4.8)
LYMPHOCYTES NFR BLD: 11.6 % (ref 18–48)
MCH RBC QN AUTO: 30.3 PG (ref 27–31)
MCHC RBC AUTO-ENTMCNC: 33.2 G/DL (ref 32–36)
MCV RBC AUTO: 91 FL (ref 82–98)
MONOCYTES # BLD AUTO: 0.6 K/UL (ref 0.3–1)
MONOCYTES NFR BLD: 4.5 % (ref 4–15)
NEUTROPHILS # BLD AUTO: 11.1 K/UL (ref 1.8–7.7)
NEUTROPHILS NFR BLD: 82.9 % (ref 38–73)
NRBC BLD-RTO: 0 /100 WBC
PLATELET # BLD AUTO: 241 K/UL (ref 150–450)
PMV BLD AUTO: 10.3 FL (ref 9.2–12.9)
POTASSIUM SERPL-SCNC: 4 MMOL/L (ref 3.5–5.1)
PROT SERPL-MCNC: 7.2 G/DL (ref 6–8.4)
RBC # BLD AUTO: 4.19 M/UL (ref 4–5.4)
SODIUM SERPL-SCNC: 141 MMOL/L (ref 136–145)
WBC # BLD AUTO: 13.32 K/UL (ref 3.9–12.7)

## 2023-04-05 PROCEDURE — 96375 TX/PRO/DX INJ NEW DRUG ADDON: CPT

## 2023-04-05 PROCEDURE — 99285 PR EMERGENCY DEPT VISIT,LEVEL V: ICD-10-PCS | Mod: ,,, | Performed by: EMERGENCY MEDICINE

## 2023-04-05 PROCEDURE — 99284 EMERGENCY DEPT VISIT MOD MDM: CPT | Mod: 25

## 2023-04-05 PROCEDURE — 63600175 PHARM REV CODE 636 W HCPCS

## 2023-04-05 PROCEDURE — 25500020 PHARM REV CODE 255: Performed by: STUDENT IN AN ORGANIZED HEALTH CARE EDUCATION/TRAINING PROGRAM

## 2023-04-05 PROCEDURE — 85025 COMPLETE CBC W/AUTO DIFF WBC: CPT

## 2023-04-05 PROCEDURE — 96374 THER/PROPH/DIAG INJ IV PUSH: CPT

## 2023-04-05 PROCEDURE — 80053 COMPREHEN METABOLIC PANEL: CPT

## 2023-04-05 PROCEDURE — 83690 ASSAY OF LIPASE: CPT

## 2023-04-05 PROCEDURE — 99285 EMERGENCY DEPT VISIT HI MDM: CPT | Mod: ,,, | Performed by: EMERGENCY MEDICINE

## 2023-04-05 PROCEDURE — 82150 ASSAY OF AMYLASE: CPT

## 2023-04-05 RX ORDER — DROPERIDOL 2.5 MG/ML
1.25 INJECTION, SOLUTION INTRAMUSCULAR; INTRAVENOUS
Status: COMPLETED | OUTPATIENT
Start: 2023-04-05 | End: 2023-04-05

## 2023-04-05 RX ORDER — MORPHINE SULFATE 4 MG/ML
4 INJECTION, SOLUTION INTRAMUSCULAR; INTRAVENOUS
Status: COMPLETED | OUTPATIENT
Start: 2023-04-05 | End: 2023-04-05

## 2023-04-05 RX ADMIN — IOHEXOL 30 ML: 350 INJECTION, SOLUTION INTRAVENOUS at 07:04

## 2023-04-05 RX ADMIN — MORPHINE SULFATE 4 MG: 4 INJECTION INTRAVENOUS at 07:04

## 2023-04-05 RX ADMIN — DROPERIDOL 1.25 MG: 2.5 INJECTION, SOLUTION INTRAMUSCULAR; INTRAVENOUS at 07:04

## 2023-04-05 NOTE — ED PROVIDER NOTES
Encounter Date: 4/5/2023       History     Chief Complaint   Patient presents with    Abdominal Pain     Recent peg & J tube placed 3/24 , pt states it feels like her peg tube is coiled up inside. Hx of gastroparesis      Patient is a 29 yo female with gerd and gastroparesis s/p PEG and J-tube placement on 3/24/23 who presents to the ED from home for 2 days of abdominal pain. She first noticed the pain on Monday as well as some swelling beneath the skin just above the PEG tube. She has also noticed her tube feeds have been slow to pass and has had to let them drain more frequently over the past 2 days. She states that the pain has progressively worsened and comes in waves. She denies any fever, chills, nausea, vomiting, diarrhea, blood in her stools, chest pain, or SOB.     Review of patient's allergies indicates:   Allergen Reactions    Wellbutrin [bupropion hcl] Other (See Comments)     Suicidal thoughts     Past Medical History:   Diagnosis Date    Allergic sinusitis     Anxiety     Digestive disorder     ACID REFLUX    Dyspareunia due to medical condition in female     Endometriosis     GERD (gastroesophageal reflux disease)     Pelvic pain in female     Postpartum depression     Syncope     CHRONIC/RECURRING    UTI (urinary tract infection)     H/O     Past Surgical History:   Procedure Laterality Date    COLONOSCOPY N/A 1/30/2020    Procedure: COLONOSCOPY;  Surgeon: Jonathon Chisholm MD;  Location: [facility];  Service: Endoscopy;  Laterality: N/A;    epidural for childbirth      ESOPHAGOGASTRODUODENOSCOPY N/A 1/30/2020    Procedure: ESOPHAGOGASTRODUODENOSCOPY (EGD);  Surgeon: Jonathon Chisholm MD;  Location: [facility];  Service: Endoscopy;  Laterality: N/A;    HYSTERECTOMY      INSERTION, PEG TUBE  3/24/2023    Procedure: INSERTION, PEG TUBE;  Surgeon: Kelvin Durán MD;  Location: [address];  Service: General;;    IUD placement and removal      LAPAROSCOPIC CHOLECYSTECTOMY N/A  2021    Procedure: CHOLECYSTECTOMY, LAPAROSCOPIC;  Surgeon: Phillip Nguyen MD;  Location: [facility] OR;  Service: General;  Laterality: N/A;    LAPAROSCOPIC SALPINGECTOMY Bilateral 2021    Procedure: SALPINGECTOMY, LAPAROSCOPIC, bilateral;  Surgeon: Lev Giles Jr., MD;  Location: [facility] OR;  Service: OB/GYN;  Laterality: Bilateral;    LAPAROSCOPIC TOTAL HYSTERECTOMY N/A 2021    Procedure: HYSTERECTOMY, TOTAL, LAPAROSCOPIC;  Surgeon: Lev Giles Jr., MD;  Location: [facility] OR;  Service: OB/GYN;  Laterality: N/A;  11:30 per Omari, negative     PELVIC LAPAROSCOPY      POSTPARTUM LIGATION OF FALLOPIAN TUBE Bilateral 2020    Procedure: LIGATION, FALLOPIAN TUBE, POSTPARTUM;  Surgeon: Lev Giles Jr., MD;  Location: [facility] OR;  Service: OB/GYN;  Laterality: Bilateral;    WISDOM TOOTH EXTRACTION      XI ROBOTIC JEJUNOSTOMY N/A 3/24/2023    Procedure: XI ROBOTIC JEJUNOSTOMY;  Surgeon: Kelvin Durán MD;  Location: [address];  Service: General;  Laterality: N/A;  NO NARCOTICS     Family History   Problem Relation Age of Onset    Birth defects Sister     Diabetes Maternal Grandmother     Diabetes Paternal Grandfather     Stomach cancer Paternal Grandfather     No Known Problems Mother      Social History     Tobacco Use    Smoking status: Former     Types: Cigarettes     Quit date:      Years since quittin.2    Smokeless tobacco: Never    Tobacco comments:     Currently smokes medical marijuana    Substance Use Topics    Alcohol use: Not Currently     Comment: occasionally    Drug use: Yes     Types: Marijuana     Review of Systems    Physical Exam     Initial Vitals   BP Pulse Resp Temp SpO2   23 1734 23 1733 23 1733 23 1733 23 1733   121/74 (!) 116 20 99.1 °F (37.3 °C) 99 %      MAP       --                Physical Exam    Nursing note and vitals reviewed.  Constitutional: She appears well-developed and well-nourished. She is not  diaphoretic. She appears distressed (writhing in pain).   HENT:   Head: Normocephalic and atraumatic.   Right Ear: External ear normal.   Left Ear: External ear normal.   Neck: Neck supple.   Normal range of motion.  Cardiovascular:  Regular rhythm.   Tachycardia present.         Pulmonary/Chest: No respiratory distress. She has no wheezes.   Abdominal: Abdomen is soft. She exhibits distension. There is abdominal tenderness (exquisetly TTP around PEG) in the left upper quadrant.   PEG tube in LUQ with area of induration superior to site  J-tube beneath PEG in LUQ with erythema at site of sutures       Musculoskeletal:         General: No edema. Normal range of motion.      Cervical back: Normal range of motion and neck supple.     Neurological: She is alert and oriented to person, place, and time.   Psychiatric: She has a normal mood and affect. Her behavior is normal. Thought content normal.       ED Course   Procedures  Labs Reviewed   CBC W/ AUTO DIFFERENTIAL - Abnormal; Notable for the following components:       Result Value    WBC 13.32 (*)     Gran # (ANC) 11.1 (*)     Gran % 82.9 (*)     Lymph % 11.6 (*)     All other components within normal limits   COMPREHENSIVE METABOLIC PANEL - Abnormal; Notable for the following components:    Alkaline Phosphatase 50 (*)     ALT 6 (*)     All other components within normal limits   AMYLASE   LIPASE   PREGNANCY TEST, URINE RAPID          Imaging Results              XR Gastric tube check, non-radiologist performed (Final result)  Result time 04/05/23 20:47:27   Procedure changed from X-Ray Abdomen AP 1 View (KUB)     Final result by Tyler Rodriguez MD (04/05/23 20:47:27)                   Impression:      As above.      Electronically signed by: Tyler Rodriguez MD  Date:    04/05/2023  Time:    20:47               Narrative:    EXAMINATION:  XR GASTRIC TUBE CHECK, NON-RADIOLOGIST PERFORMED    CLINICAL HISTORY:  peg tube check;  Unspecified abdominal  pain    TECHNIQUE:  Single view of the abdomen was performed following administration of 30 mL Isovue 350 contrast material through the patient's G-tube in the emergency department.    COMPARISON:  02/02/2023.    FINDINGS:  Nonobstructed bowel-gas pattern.  G-tube tip located over the stomach.  Injected contrast pools in the gastric lumen with trace amount of contrast passing into the proximal duodenum.  No extravasation of contrast identified.    Cholecystectomy clips right upper quadrant.  Surgical clip in the pelvis.                                    X-Rays:   Independently Interpreted Readings:   Abdomen: Injected contrast pools in the gastric lumen with trace amount of contrast passing into the proximal duodenum.  No extravasation of contrast identified.   Medications   morphine injection 4 mg (4 mg Intravenous Given 4/5/23 1927)   iohexol (OMNIPAQUE) oral solution 30 mL (30 mLs Oral Given 4/5/23 1957)   droperidoL injection 1.25 mg (1.25 mg Intravenous Given 4/5/23 1955)     Medical Decision Making:   History:   Old Medical Records: I decided to obtain old medical records.  Initial Assessment:   Patient arrived tachycardic to 110's but AF and otherwise HDS satting well on room air. She is tearful and writhing in pain on the bed. PEG tube and J-tube to abdomen with tender induration just superior to the PEG.   Differential Diagnosis:   Dislodged PEG tube, pancreatitis, SBO, bowel perforation  Clinical Tests:   Lab Tests: Ordered and Reviewed  Radiological Study: Ordered and Reviewed  ED Management:  General surgery was consulted in the ED and recommended a KUB with PEG tube study. Pain was treated with morphine which provided relief only briefly. IV droperidol was administered to control pain. Significant relief obtained with droperidol. Tachycardia improved with pain relief. CBC remarkable for leukocytosis to 13.3K. Amylase and lipase wnl, pancreatitis unlikely. PEG tube study normal without extravasation.  Patient was discharged home with strict return precautions and PCP f/u.            ED Course as of 04/05/23 2110 Wed Apr 05, 2023 1947 WBC(!): 13.32 [MM]   1949 28-year-old female past medical history of gastroparesis postop day 12 days status post robotic G and J tubes on 3/24/23 by Dr. Luis Armando rojas abdominal pain.  [BD]   2054 XR Gastric tube check, non-radiologist performed [BD]   2055 FINDINGS:  Nonobstructed bowel-gas pattern.  G-tube tip located over the stomach.  Injected contrast pools in the gastric lumen with trace amount of contrast passing into the proximal duodenum.  No extravasation of contrast identified.     Cholecystectomy clips right upper quadrant.  Surgical clip in the pelvis.     Impression:     As above.        Electronically signed by: Tyler Rodriguez MD  Date:                                            04/05/2023 [BD]      ED Course User Index  [BD] Ochoa Tiwari MD  [MM] Meena Hester MD                 Clinical Impression:   Final diagnoses:  [R10.9] Abdominal pain        ED Disposition Condition    Discharge Stable          ED Prescriptions    None       Follow-up Information       Follow up With Specialties Details Why Contact Info    Eusebio Escamilla MD Internal Medicine   [address] [facility] [address] [phone]      [facility] - Emergency Dept Emergency Medicine   [facility] [facility] [address] [phone]             Meena Hester MD  Resident  04/05/23 2111

## 2023-04-06 ENCOUNTER — OFFICE VISIT (OUTPATIENT)
Dept: SURGERY | Facility: CLINIC | Age: 28
End: 2023-04-06
Payer: MEDICAID

## 2023-04-06 ENCOUNTER — LAB VISIT (OUTPATIENT)
Dept: LAB | Facility: HOSPITAL | Age: 28
End: 2023-04-06
Attending: SURGERY
Payer: MEDICAID

## 2023-04-06 VITALS
SYSTOLIC BLOOD PRESSURE: 109 MMHG | WEIGHT: 137.13 LBS | HEIGHT: 65 IN | DIASTOLIC BLOOD PRESSURE: 73 MMHG | HEART RATE: 85 BPM | BODY MASS INDEX: 22.85 KG/M2

## 2023-04-06 DIAGNOSIS — K31.84 GASTROPARESIS: Primary | ICD-10-CM

## 2023-04-06 DIAGNOSIS — K31.84 GASTROPARESIS: ICD-10-CM

## 2023-04-06 LAB — PREALB SERPL-MCNC: 17 MG/DL (ref 20–43)

## 2023-04-06 PROCEDURE — 3074F PR MOST RECENT SYSTOLIC BLOOD PRESSURE < 130 MM HG: ICD-10-PCS | Mod: CPTII,,, | Performed by: SURGERY

## 2023-04-06 PROCEDURE — 1160F PR REVIEW ALL MEDS BY PRESCRIBER/CLIN PHARMACIST DOCUMENTED: ICD-10-PCS | Mod: CPTII,,, | Performed by: SURGERY

## 2023-04-06 PROCEDURE — 99024 POSTOP FOLLOW-UP VISIT: CPT | Mod: ,,, | Performed by: SURGERY

## 2023-04-06 PROCEDURE — 3074F SYST BP LT 130 MM HG: CPT | Mod: CPTII,,, | Performed by: SURGERY

## 2023-04-06 PROCEDURE — 36415 COLL VENOUS BLD VENIPUNCTURE: CPT | Performed by: SURGERY

## 2023-04-06 PROCEDURE — 1159F PR MEDICATION LIST DOCUMENTED IN MEDICAL RECORD: ICD-10-PCS | Mod: CPTII,,, | Performed by: SURGERY

## 2023-04-06 PROCEDURE — 3078F PR MOST RECENT DIASTOLIC BLOOD PRESSURE < 80 MM HG: ICD-10-PCS | Mod: CPTII,,, | Performed by: SURGERY

## 2023-04-06 PROCEDURE — 3008F PR BODY MASS INDEX (BMI) DOCUMENTED: ICD-10-PCS | Mod: CPTII,,, | Performed by: SURGERY

## 2023-04-06 PROCEDURE — 1159F MED LIST DOCD IN RCRD: CPT | Mod: CPTII,,, | Performed by: SURGERY

## 2023-04-06 PROCEDURE — 99213 OFFICE O/P EST LOW 20 MIN: CPT | Mod: PBBFAC | Performed by: SURGERY

## 2023-04-06 PROCEDURE — 3008F BODY MASS INDEX DOCD: CPT | Mod: CPTII,,, | Performed by: SURGERY

## 2023-04-06 PROCEDURE — 84134 ASSAY OF PREALBUMIN: CPT | Performed by: SURGERY

## 2023-04-06 PROCEDURE — 1160F RVW MEDS BY RX/DR IN RCRD: CPT | Mod: CPTII,,, | Performed by: SURGERY

## 2023-04-06 PROCEDURE — 3078F DIAST BP <80 MM HG: CPT | Mod: CPTII,,, | Performed by: SURGERY

## 2023-04-06 PROCEDURE — 99024 PR POST-OP FOLLOW-UP VISIT: ICD-10-PCS | Mod: ,,, | Performed by: SURGERY

## 2023-04-06 PROCEDURE — 99999 PR PBB SHADOW E&M-EST. PATIENT-LVL III: ICD-10-PCS | Mod: PBBFAC,,, | Performed by: SURGERY

## 2023-04-06 PROCEDURE — 99999 PR PBB SHADOW E&M-EST. PATIENT-LVL III: CPT | Mod: PBBFAC,,, | Performed by: SURGERY

## 2023-04-06 RX ORDER — KETOROLAC TROMETHAMINE 10 MG/1
10 TABLET, FILM COATED ORAL EVERY 6 HOURS PRN
Qty: 40 TABLET | Refills: 0 | Status: SHIPPED | OUTPATIENT
Start: 2023-04-06 | End: 2023-04-11

## 2023-04-06 RX ORDER — SULFAMETHOXAZOLE AND TRIMETHOPRIM 800; 160 MG/1; MG/1
1 TABLET ORAL 2 TIMES DAILY
Qty: 20 TABLET | Refills: 0 | Status: SHIPPED | OUTPATIENT
Start: 2023-04-06 | End: 2023-04-16

## 2023-04-06 RX ORDER — GABAPENTIN 600 MG/1
600 TABLET ORAL 3 TIMES DAILY
Qty: 90 TABLET | Refills: 11 | Status: SHIPPED | OUTPATIENT
Start: 2023-04-06 | End: 2024-04-05

## 2023-04-06 NOTE — ED TRIAGE NOTES
"Pt came in due to abdominal pain, with a pain score of 10 of 10. Pt states the pain two days and has gotten progressively worse. Pt stated that she felt something " tear inside her abdomen near the peg tube. Pt has a hx of gastroparesis.   "

## 2023-04-06 NOTE — DISCHARGE INSTRUCTIONS
Take tylenol and ibuprofen as needed for abdominal pain.     Please follow up with PCP.     Please return to the ED if you develop new or worsening symptoms such as but not limited to fevers, abdominal pain, vomiting, or lightheadedness.

## 2023-04-06 NOTE — PROGRESS NOTES
I have seen the patient, reviewed the Resident's history and physical, assessment and plan. I have personally interviewed and examined the patient at bedside and: agree with the findings.     29y/o with gerd and gastroparesis s/p peg and j tube 3/24/23.  Her gastroparesis symptoms are improved a little.  She has extremely severe epigastric pain (at peg site), moderate nausea and no vomiting.  See resident note for meds.  She went to the ER for pain at her peg site.  She vents twice a day for about 200cc a time.  Her weight is stable.  She was on full tube feeds for a while but decreased this weekend due to pain.  She is now 20 cc below goal per hour.        shows several narcotics rx in 2021 but none since.  She did get morphine in the ER but pain only improved for a short time.     She is not able to work due to symptoms.    PE Thickened area at peg site with some pus drainage.  J tube site clear.     Cbc, cmp basically ok  Ugi with small bowel 2022 reviewed, films viewed    -Delayed gastric emptying consistent with the clinical diagnosis of gastro paresis.    -Normal appearing small bowel.    -Questionable area in the cervical esophagus.  Would consider endoscopy.  Ct 2022 reviewed, films viewed    -No abnormality.    -Stomach appears ok  Ct 2023 (for peg tube site pain), reviewed, films viewed    1. Peg tube in the stomach, no abscess and appears appropriately placed to me    2. Previous cholecystectomy    3. There is a catheter in the left abdomen question if this is a drainage catheter    4. No bowel obstruction  Ges 2022 reviewed    -At 241 minutes, gastric emptying was noted to be 61%, c/w gastroparesis  Egd 2020 reviewed    - Normal esophagus.     - Gastritis. Biopsied.     - Normal examined duodenum.     - Biopsies were taken with a cold forceps for         histology in the second portion of the duodenum.   Ekg 2023 reviewed    -Normal     Gastroparesis with severe protein calorie malnutrition.  Pain at  peg site, likely abscess.  Awaiting repeat egd for dysphagia and questionable area in the cervical esophagus.  See gyn for ovarian cyst.  Obtain prealbumin and see if we can schedule pyloric procedure (she is more interested in this over gstim).   Pcp clearance.  She wants to try to eat some solid food but this may worsen symptoms and increase gastric fluid production.

## 2023-04-06 NOTE — CONSULTS
"GENERAL SURGERY  Consultation      REASON FOR CONSULT:  Pain near PEG site     SUBJECTIVE:     HISTORY OF PRESENT ILLNESS:  Tess Rodríguez is a 28 y.o. female with gastroparesis s/p laparoscopic jejunostomy tube and PEG placement 3/24/23 presents to the ED with increasing pain from the PEG site. She notes generalized soreness from around the PEG site since hospital discharge 3/27, however notes when increasing sharp pain and a "twisting feel" with a positional change two days ago. She presented to the ED 4/1/23 for similar symptoms, and was discharged from the ED. She notes some redness, particularly at the two suture sites of her jejunostomy, not from PEG site. No drainage. Normal bowel movements. Her pain is focused on the tube exit site. No fever/chills at home.     General Surgery has been consulted for evaluation of the PEG tube.      MEDICATIONS:  Home Medications:  Current Facility-Administered Medications on File Prior to Encounter   Medication Dose Route Frequency Provider Last Rate Last Admin    ceFAZolin 2 g in dextrose 5 % in water (D5W) 5 % 50 mL IVPB (MB+)  2 g Intravenous On Call Procedure Vicente Espinoza MD         Current Outpatient Medications on File Prior to Encounter   Medication Sig Dispense Refill    acetaminophen (TYLENOL) 500 MG tablet Take 1 tablet (500 mg total) by mouth every 6 (six) hours as needed for Pain.  0    cetirizine (ZYRTEC) 10 MG tablet Take 1 tablet (10 mg total) by mouth once daily. 30 tablet 0    fluticasone propionate (FLONASE) 50 mcg/actuation nasal spray 1 spray (50 mcg total) by Each Nostril route 2 (two) times daily as needed. 15 g 0    hyoscyamine (LEVSIN/SL) 0.125 mg Subl Place 1 tablet (0.125 mg total) under the tongue every 4 (four) hours as needed. 180 tablet 11    ibuprofen (ADVIL,MOTRIN) 600 MG tablet Take 1 tablet (600 mg total) by mouth every 6 (six) hours as needed for Pain.      metoclopramide HCl (REGLAN) 10 MG tablet Take 1 tablet (10 mg total) " by mouth every 6 (six) hours as needed. 12 tablet 0    metoclopramide HCl (REGLAN) 5 mg/5 mL Soln Take 10 mLs (10 mg total) by mouth 3 (three) times daily before meals. 210 mL 0    ondansetron (ZOFRAN) 4 MG tablet Take 4 mg by mouth 2 (two) times daily.      pantoprazole (PROTONIX) 40 MG tablet Take 40 mg by mouth once daily.      phenylephrine-DM-guaiFENesin (DECONEX DMX) 10-17.5-400 mg Tab Take 1 tablet by mouth every 4 to 6 hours as needed (congestion/cough). 20 tablet 0     Inpatient Medications:  Infusions:  PRN Medications:    ALLERGIES:    Review of patient's allergies indicates:   Allergen Reactions    Wellbutrin [bupropion hcl] Other (See Comments)     Suicidal thoughts       PAST MEDICAL HISTORY:    Past Medical History:   Diagnosis Date    Allergic sinusitis     Anxiety     Digestive disorder     ACID REFLUX    Dyspareunia due to medical condition in female     Endometriosis     GERD (gastroesophageal reflux disease)     Pelvic pain in female     Postpartum depression     Syncope     CHRONIC/RECURRING    UTI (urinary tract infection)     H/O       SURGICAL HISTORY:  Past Surgical History:   Procedure Laterality Date    COLONOSCOPY N/A 1/30/2020    Procedure: COLONOSCOPY;  Surgeon: Jonathon Chisholm MD;  Location: [facility];  Service: Endoscopy;  Laterality: N/A;    epidural for childbirth      ESOPHAGOGASTRODUODENOSCOPY N/A 1/30/2020    Procedure: ESOPHAGOGASTRODUODENOSCOPY (EGD);  Surgeon: Jonathon Chisholm MD;  Location: [facility];  Service: Endoscopy;  Laterality: N/A;    HYSTERECTOMY      INSERTION, PEG TUBE  3/24/2023    Procedure: INSERTION, PEG TUBE;  Surgeon: Kelvin Durán MD;  Location: [address];  Service: General;;    IUD placement and removal      LAPAROSCOPIC CHOLECYSTECTOMY N/A 12/14/2021    Procedure: CHOLECYSTECTOMY, LAPAROSCOPIC;  Surgeon: Phillip Nguyen MD;  Location: [facility];  Service: General;  Laterality: N/A;    LAPAROSCOPIC SALPINGECTOMY Bilateral  2021    Procedure: SALPINGECTOMY, LAPAROSCOPIC, bilateral;  Surgeon: Lev Giles Jr., MD;  Location: [facility] OR;  Service: OB/GYN;  Laterality: Bilateral;    LAPAROSCOPIC TOTAL HYSTERECTOMY N/A 2021    Procedure: HYSTERECTOMY, TOTAL, LAPAROSCOPIC;  Surgeon: Lev Giles Jr., MD;  Location: [facility] OR;  Service: OB/GYN;  Laterality: N/A;  11:30 per Omari, negative     PELVIC LAPAROSCOPY      POSTPARTUM LIGATION OF FALLOPIAN TUBE Bilateral 2020    Procedure: LIGATION, FALLOPIAN TUBE, POSTPARTUM;  Surgeon: Lev Giles Jr., MD;  Location: [facility] OR;  Service: OB/GYN;  Laterality: Bilateral;    WISDOM TOOTH EXTRACTION      XI ROBOTIC JEJUNOSTOMY N/A 3/24/2023    Procedure: XI ROBOTIC JEJUNOSTOMY;  Surgeon: Kelvin Durán MD;  Location: [facility] OR [address] [place] [address];  Service: General;  Laterality: N/A;  NO NARCOTICS       FAMILY HISTORY:  Family History   Problem Relation Age of Onset    Birth defects Sister     Diabetes Maternal Grandmother     Diabetes Paternal Grandfather     Stomach cancer Paternal Grandfather     No Known Problems Mother        SOCIAL HISTORY:  Social History     Tobacco Use    Smoking status: Former     Types: Cigarettes     Quit date:      Years since quittin.2    Smokeless tobacco: Never    Tobacco comments:     Currently smokes medical marijuana    Substance Use Topics    Alcohol use: Not Currently     Comment: occasionally    Drug use: Yes     Types: Marijuana        REVIEW OF SYSTEMS:  A 10-point review of systems is negative except for the above mentioned in the HPI.    OBJECTIVE:     Most Recent Vitals:  Temp: 98.3 °F (36.8 °C) (23)  Pulse: 110 (23)  Resp: (!) 30 (23)  BP: 120/77 (23)  SpO2: 96 % (23)    24-Hour Vitals:  Temp:  [98.3 °F (36.8 °C)-99.1 °F (37.3 °C)]   Pulse:  [110-116]   Resp:  [18-30]   BP: (120-121)/(74-77)   SpO2:  [96 %-99 %]     24-Hour I&O:No intake or output data in the 24  hours ending 04/05/23 2100    PHYSICAL EXAM:  AAO, NAD, well developed and well nourished.  Head normocephalic, atraumatic.  Trachea midline, neck supple.  Respirations unlabored with good inspiratory effort.  Heart regular rate and rhythm.  Abdomen soft, thin, nondistended, some tenderness around the gastrostomy tube site. No overlying skin changes. Mild induration around the tube exit site, but no overlying dressing or fluid collections. Jejunostomy tube site is clean/dry with no redness or induration      LABORATORY VALUES:  Recent Labs   Lab 04/05/23 1932   WBC 13.32*   HGB 12.7   HCT 38.2        Recent Labs   Lab 04/05/23 1932      K 4.0      CO2 28   BUN 9   CREATININE 0.7   GLU 86   CALCIUM 9.0   AST 13   ALT 6*   ALKPHOS 50*   BILITOT 0.3   PROT 7.2   ALBUMIN 3.6   AMYLASE 63   LIPASE 15   No results for input(s): INR, PTT, LABHEPA, LACTATE, TROPONINI, CPK, CPKMB, MB, BNP in the last 72 hours.No results for input(s): PH, PCO2, PO2, HCO3 in the last 72 hours.    DIAGNOSTIC STUDIES:  X-Ray: Reviewed    ASSESSMENT:     Tess Rodríguez is a 28 y.o. female with PMH gastroparesis s/p laparoscopic jejunostomy tube placement and PEG on 3/23/23. She notes increasing pain and irritation around the gastrostomy site. Tube study ensures adequate placement, and both G/J tubes flush without issue. No signs of acute infection. Discussed with patient recommendation to alternate tylenol/ibuprofen for tube exit site discomfort. Abdominal exam is otherwise non-revealing, without peritoneal signs.     PEG bumper loosened, previously at 3cm at the skin with induration, to 5 cm. There was signs of pressure-related skin changes around external bolster which could be contributory to discomfort today. I discussed with patient to keep external bumper freely mobile to prevent buried bumper syndrome.       PLAN:  No acute surgical intervention indicated at this time. Tube study with satisfactory  placement  Recommend follow-up appointment with ida Langford scheduled on 4/6 at 1:15PM  Supplemental tube feeds via jejunostomy as tolerated  No sign of acute infection from G site      Thank you, and please call back if/when General Surgery can be of any further assistance in the future care of this patient.      Kori Hsieh MD

## 2023-04-06 NOTE — PROGRESS NOTES
Progress Note  General Surgery      SUBJECTIVE:   Tess Rodríguez is a  28 y.o.female with gastroparesis s/p laparoscopic jejunostomy tube and PEG placement 3/24/23. Patient states she continues to have pain around the G tube site. She presented to the ED yesterday because of the pain and subsequent tube study revealed proper position of the G tube without evidence of obstruction. The G tube bumper was loosened to 5 cm. Patient also was having some abdominal pain with tube feeds starting on Saturday, she halved the feedings to 25cc/h and has been gradually trying to increase to goal. Currently feeds are at 30 cc/h with a goal of 50 cc/h. She notes her gastroparesis symptoms have significantly resolved, especially the nausea and vomiting. However, she is venting her G tube about twice a day with approximately 200cc of output with each vent. She has not been taking any reglan or zofran.     Gastroparesis symptoms:   (Severity, Frequency)  Vomiting 0,0  Nausea 2,2  Early satiety 0,0  Bloating 3,3   Postprandial fullness 2,3   Epigastric pain 4,4  Epigastric burning 4,4      Review of patient's allergies indicates:   Allergen Reactions    Wellbutrin [bupropion hcl] Other (See Comments)     Suicidal thoughts       Past Medical History:   Diagnosis Date    Allergic sinusitis     Anxiety     Digestive disorder     ACID REFLUX    Dyspareunia due to medical condition in female     Endometriosis     GERD (gastroesophageal reflux disease)     Pelvic pain in female     Postpartum depression     Syncope     CHRONIC/RECURRING    UTI (urinary tract infection)     H/O     Past Surgical History:   Procedure Laterality Date    COLONOSCOPY N/A 1/30/2020    Procedure: COLONOSCOPY;  Surgeon: Jonathon Chisholm MD;  Location: [facility];  Service: Endoscopy;  Laterality: N/A;    epidural for childbirth      ESOPHAGOGASTRODUODENOSCOPY N/A 1/30/2020    Procedure: ESOPHAGOGASTRODUODENOSCOPY (EGD);  Surgeon: Jonathon Chisholm MD;   Location: [facility] ENDO;  Service: Endoscopy;  Laterality: N/A;    HYSTERECTOMY      INSERTION, PEG TUBE  3/24/2023    Procedure: INSERTION, PEG TUBE;  Surgeon: Kelvin Durán MD;  [address];  Service: General;;    IUD placement and removal      LAPAROSCOPIC CHOLECYSTECTOMY N/A 2021    Procedure: CHOLECYSTECTOMY, LAPAROSCOPIC;  Surgeon: Phillip Nguyen MD;  Location: [facility];  Service: General;  Laterality: N/A;    LAPAROSCOPIC SALPINGECTOMY Bilateral 2021    Procedure: SALPINGECTOMY, LAPAROSCOPIC, bilateral;  Surgeon: Lev Giles Jr., MD;  Location: [facility] OR;  Service: OB/GYN;  Laterality: Bilateral;    LAPAROSCOPIC TOTAL HYSTERECTOMY N/A 2021    Procedure: HYSTERECTOMY, TOTAL, LAPAROSCOPIC;  Surgeon: Lev Giles Jr., MD;  Location: [facility] OR;  Service: OB/GYN;  Laterality: N/A;  11:30 per Omari, negative     PELVIC LAPAROSCOPY      POSTPARTUM LIGATION OF FALLOPIAN TUBE Bilateral 2020    Procedure: LIGATION, FALLOPIAN TUBE, POSTPARTUM;  Surgeon: Lev Giles Jr., MD;  Location: [facility] OR;  Service: OB/GYN;  Laterality: Bilateral;    WISDOM TOOTH EXTRACTION      XI ROBOTIC JEJUNOSTOMY N/A 3/24/2023    Procedure: XI ROBOTIC JEJUNOSTOMY;  Surgeon: Kelvin Durán MD;  [address];  Service: General;  Laterality: N/A;  NO NARCOTICS     Family History   Problem Relation Age of Onset    Birth defects Sister     Diabetes Maternal Grandmother     Diabetes Paternal Grandfather     Stomach cancer Paternal Grandfather     No Known Problems Mother      Social History     Tobacco Use    Smoking status: Former     Types: Cigarettes     Quit date:      Years since quittin.2    Smokeless tobacco: Never    Tobacco comments:     Currently smokes medical marijuana    Substance Use Topics    Alcohol use: Not Currently     Comment: occasionally    Drug use: Yes     Types: Marijuana        OBJECTIVE:     Vitals:    23 1310   BP:  109/73   Pulse: 85       Physical Exam:  Physical Exam  HENT:      Head: Normocephalic.   Eyes:      Pupils: Pupils are equal, round, and reactive to light.   Cardiovascular:      Rate and Rhythm: Normal rate.   Pulmonary:      Effort: Pulmonary effort is normal.   Abdominal:      General: Abdomen is flat.      Palpations: Abdomen is soft.      Tenderness: There is abdominal tenderness. There is no guarding.      Comments: G and J tube in place without sign of infection  G tube bumper at 5 cm   Skin irritation surround g tube exit site    Musculoskeletal:         General: Normal range of motion.   Skin:     Capillary Refill: Capillary refill takes less than 2 seconds.   Neurological:      General: No focal deficit present.      Mental Status: She is alert.       XR 4/5/2023  Narrative & Impression  EXAMINATION:  XR GASTRIC TUBE CHECK, NON-RADIOLOGIST PERFORMED     CLINICAL HISTORY:  peg tube check;  Unspecified abdominal pain     TECHNIQUE:  Single view of the abdomen was performed following administration of 30 mL Isovue 350 contrast material through the patient's G-tube in the emergency department.     COMPARISON:  02/02/2023.     FINDINGS:  Nonobstructed bowel-gas pattern.  G-tube tip located over the stomach.  Injected contrast pools in the gastric lumen with trace amount of contrast passing into the proximal duodenum.  No extravasation of contrast identified.     Cholecystectomy clips right upper quadrant.  Surgical clip in the pelvis.    ASSESSMENT/PLAN:   Tess Rodríguez is a 28 y.o. female with gastroparesis s/p laparoscopic jejunostomy tube and PEG placement 3/24/23.    Plan:   - start gabapentin 600mg TID   - course of Bactrim for possible soft tissue infection   - nutrition labs      Jil Pickard MD  General Surgery   PGY-1

## 2023-04-06 NOTE — LETTER
April 6, 2023        Eusebio Escamilla MD  [address]  [place] [address]             [facility]  [address]  [facility] [address]  Phone: [phone]   Patient: Tess Rodríguez   MR Number: [id number]   YOB: 1995   Date of Visit: 4/6/2023       Dear Dr. Escamilla:    Thank you for referring Tess Rodríguez to me for evaluation. Attached you will find relevant portions of my assessment and plan of care.    If you have questions, please do not hesitate to call me. I look forward to following Tess Rodríguez along with you.    Sincerely,      Kelvin Durán MD            CC    No Recipients    Enclosure

## 2023-05-03 ENCOUNTER — TELEPHONE (OUTPATIENT)
Dept: SURGERY | Facility: CLINIC | Age: 28
End: 2023-05-03
Payer: MEDICAID

## 2023-05-03 NOTE — TELEPHONE ENCOUNTER
----- Message from Richa ADORNO Route sent at 5/3/2023 10:18 AM CDT -----  Regarding: Pet Tube and J Tube  Contact: pt.[phone]  Pt is calling wanting to know when is appt to have Pet tube removed she was told she would only have for six weeks. Patient Requesting Call Back @ [phone]       I called and spoke to the Patient.  I asked if she was ready to schedule the Pyloric Procedure with Dr. Durán.  She says that she is ready for surgery whenever it can be scheduled.  I explained that Dr. Durán will be using the Robot for her surgery.  I informed her that Dr. Durán is asking for PCP Clearance.  Stated that she has not seen her PCP recently and she was not aware that she would need clearance.  I explained that I will reach out to her PCP and fax over her recent labs.     Stated that she is not having any difficulty with her J Tube but she thought that Dr. Durán would be removing the J tube in 6 weeks.  She asked if he will be removing the J tube during the surgery.  I explained that I will ask Dr. Durán and let her know.  She said when asked that she does not eat every day.  She did not have any other questions at present.

## 2023-05-04 ENCOUNTER — PATIENT MESSAGE (OUTPATIENT)
Dept: SURGERY | Facility: CLINIC | Age: 28
End: 2023-05-04
Payer: MEDICAID

## 2023-05-04 ENCOUNTER — TELEPHONE (OUTPATIENT)
Dept: SURGERY | Facility: CLINIC | Age: 28
End: 2023-05-04
Payer: MEDICAID

## 2023-05-04 NOTE — TELEPHONE ENCOUNTER
----- Message from Carmen Santiago MA sent at 5/4/2023  1:20 PM CDT -----  Contact: [phone]  [facility] nurse Meme is reporting pt's J-tube has green drainage with redness around the site. The pt reported to her that it has a odor. She also states the pt has pain at the site. Please reach back out to Meme at [phone].     I called and spoke to Meme.  She stated that she is with the Patient.  I asked her if she would be able to send us a picture.  She said that she will.  I have been messaging with the Patient today via the Patient Portal and the Patient had not mentioned anything to me.

## 2023-05-05 ENCOUNTER — PATIENT MESSAGE (OUTPATIENT)
Dept: SURGERY | Facility: HOSPITAL | Age: 28
End: 2023-05-05
Payer: MEDICAID

## 2023-05-09 ENCOUNTER — EXTERNAL HOME HEALTH (OUTPATIENT)
Dept: HOME HEALTH SERVICES | Facility: HOSPITAL | Age: 28
End: 2023-05-09
Payer: MEDICAID

## 2023-05-18 ENCOUNTER — TELEPHONE (OUTPATIENT)
Dept: SURGERY | Facility: CLINIC | Age: 28
End: 2023-05-18
Payer: MEDICAID

## 2023-05-18 ENCOUNTER — ANESTHESIA EVENT (OUTPATIENT)
Dept: SURGERY | Facility: HOSPITAL | Age: 28
DRG: 328 | End: 2023-05-18
Payer: MEDICAID

## 2023-05-19 ENCOUNTER — ANESTHESIA (OUTPATIENT)
Dept: SURGERY | Facility: HOSPITAL | Age: 28
DRG: 328 | End: 2023-05-19
Payer: MEDICAID

## 2023-05-19 ENCOUNTER — HOSPITAL ENCOUNTER (INPATIENT)
Facility: HOSPITAL | Age: 28
LOS: 1 days | Discharge: HOME | DRG: 328 | End: 2023-05-20
Attending: SURGERY | Admitting: SURGERY
Payer: MEDICAID

## 2023-05-19 DIAGNOSIS — K31.84 GASTROPARESIS: ICD-10-CM

## 2023-05-19 PROCEDURE — 43659 PR LAP PYLOROMYOTOMY: ICD-10-PCS | Mod: ,,, | Performed by: SURGERY

## 2023-05-19 PROCEDURE — 11000001 HC ACUTE MED/SURG PRIVATE ROOM

## 2023-05-19 PROCEDURE — 63600175 PHARM REV CODE 636 W HCPCS: Performed by: STUDENT IN AN ORGANIZED HEALTH CARE EDUCATION/TRAINING PROGRAM

## 2023-05-19 PROCEDURE — 71000015 HC POSTOP RECOV 1ST HR: Performed by: SURGERY

## 2023-05-19 PROCEDURE — 25000003 PHARM REV CODE 250: Performed by: ANESTHESIOLOGY

## 2023-05-19 PROCEDURE — D9220A PRA ANESTHESIA: Mod: ANES,,, | Performed by: ANESTHESIOLOGY

## 2023-05-19 PROCEDURE — 63600175 PHARM REV CODE 636 W HCPCS: Performed by: ANESTHESIOLOGY

## 2023-05-19 PROCEDURE — 25000003 PHARM REV CODE 250: Performed by: STUDENT IN AN ORGANIZED HEALTH CARE EDUCATION/TRAINING PROGRAM

## 2023-05-19 PROCEDURE — 94761 N-INVAS EAR/PLS OXIMETRY MLT: CPT

## 2023-05-19 PROCEDURE — 25000003 PHARM REV CODE 250: Performed by: NURSE ANESTHETIST, CERTIFIED REGISTERED

## 2023-05-19 PROCEDURE — D9220A PRA ANESTHESIA: Mod: CRNA,,, | Performed by: NURSE ANESTHETIST, CERTIFIED REGISTERED

## 2023-05-19 PROCEDURE — 71000016 HC POSTOP RECOV ADDL HR: Performed by: SURGERY

## 2023-05-19 PROCEDURE — 36000711: Performed by: SURGERY

## 2023-05-19 PROCEDURE — 37000008 HC ANESTHESIA 1ST 15 MINUTES: Performed by: SURGERY

## 2023-05-19 PROCEDURE — D9220A PRA ANESTHESIA: ICD-10-PCS | Mod: ANES,,, | Performed by: ANESTHESIOLOGY

## 2023-05-19 PROCEDURE — 36000710: Performed by: SURGERY

## 2023-05-19 PROCEDURE — D9220A PRA ANESTHESIA: ICD-10-PCS | Mod: CRNA,,, | Performed by: NURSE ANESTHETIST, CERTIFIED REGISTERED

## 2023-05-19 PROCEDURE — 43659 UNLISTED LAPS PX STOMACH: CPT | Mod: ,,, | Performed by: SURGERY

## 2023-05-19 PROCEDURE — 71000033 HC RECOVERY, INTIAL HOUR: Performed by: SURGERY

## 2023-05-19 PROCEDURE — 25000003 PHARM REV CODE 250: Performed by: SURGERY

## 2023-05-19 PROCEDURE — S0030 INJECTION, METRONIDAZOLE: HCPCS | Performed by: STUDENT IN AN ORGANIZED HEALTH CARE EDUCATION/TRAINING PROGRAM

## 2023-05-19 PROCEDURE — 37000009 HC ANESTHESIA EA ADD 15 MINS: Performed by: SURGERY

## 2023-05-19 PROCEDURE — 63600175 PHARM REV CODE 636 W HCPCS: Performed by: NURSE ANESTHETIST, CERTIFIED REGISTERED

## 2023-05-19 RX ORDER — LIDOCAINE 50 MG/G
1 PATCH TOPICAL DAILY
Status: DISCONTINUED | OUTPATIENT
Start: 2023-05-20 | End: 2023-05-20 | Stop reason: HOSPADM

## 2023-05-19 RX ORDER — FLUOXETINE HYDROCHLORIDE 20 MG/1
40 CAPSULE ORAL EVERY MORNING
Status: DISCONTINUED | OUTPATIENT
Start: 2023-05-20 | End: 2023-05-20 | Stop reason: HOSPADM

## 2023-05-19 RX ORDER — METRONIDAZOLE 500 MG/100ML
500 INJECTION, SOLUTION INTRAVENOUS
Status: DISCONTINUED | OUTPATIENT
Start: 2023-05-19 | End: 2023-05-19

## 2023-05-19 RX ORDER — ONDANSETRON 2 MG/ML
4 INJECTION INTRAMUSCULAR; INTRAVENOUS EVERY 6 HOURS PRN
Status: DISCONTINUED | OUTPATIENT
Start: 2023-05-19 | End: 2023-05-20 | Stop reason: HOSPADM

## 2023-05-19 RX ORDER — BUPIVACAINE HYDROCHLORIDE 2.5 MG/ML
INJECTION, SOLUTION EPIDURAL; INFILTRATION; INTRACAUDAL
Status: DISCONTINUED | OUTPATIENT
Start: 2023-05-19 | End: 2023-05-19 | Stop reason: HOSPADM

## 2023-05-19 RX ORDER — ACETAMINOPHEN 10 MG/ML
INJECTION, SOLUTION INTRAVENOUS
Status: DISCONTINUED | OUTPATIENT
Start: 2023-05-19 | End: 2023-05-19

## 2023-05-19 RX ORDER — ONDANSETRON 2 MG/ML
4 INJECTION INTRAMUSCULAR; INTRAVENOUS EVERY 6 HOURS
Status: DISCONTINUED | OUTPATIENT
Start: 2023-05-19 | End: 2023-05-20 | Stop reason: HOSPADM

## 2023-05-19 RX ORDER — ROCURONIUM BROMIDE 10 MG/ML
INJECTION, SOLUTION INTRAVENOUS
Status: DISCONTINUED | OUTPATIENT
Start: 2023-05-19 | End: 2023-05-19

## 2023-05-19 RX ORDER — KETOROLAC TROMETHAMINE 30 MG/ML
INJECTION, SOLUTION INTRAMUSCULAR; INTRAVENOUS
Status: DISCONTINUED | OUTPATIENT
Start: 2023-05-19 | End: 2023-05-19

## 2023-05-19 RX ORDER — HALOPERIDOL 5 MG/ML
0.5 INJECTION INTRAMUSCULAR EVERY 10 MIN PRN
Status: DISCONTINUED | OUTPATIENT
Start: 2023-05-19 | End: 2023-05-19 | Stop reason: HOSPADM

## 2023-05-19 RX ORDER — GABAPENTIN 300 MG/1
600 CAPSULE ORAL 3 TIMES DAILY
Status: DISCONTINUED | OUTPATIENT
Start: 2023-05-19 | End: 2023-05-20 | Stop reason: HOSPADM

## 2023-05-19 RX ORDER — HYDROMORPHONE HYDROCHLORIDE 1 MG/ML
0.2 INJECTION, SOLUTION INTRAMUSCULAR; INTRAVENOUS; SUBCUTANEOUS EVERY 5 MIN PRN
Status: DISCONTINUED | OUTPATIENT
Start: 2023-05-19 | End: 2023-05-19 | Stop reason: HOSPADM

## 2023-05-19 RX ORDER — MIDAZOLAM HYDROCHLORIDE 1 MG/ML
INJECTION, SOLUTION INTRAMUSCULAR; INTRAVENOUS
Status: DISCONTINUED | OUTPATIENT
Start: 2023-05-19 | End: 2023-05-19

## 2023-05-19 RX ORDER — SODIUM CHLORIDE, SODIUM LACTATE, POTASSIUM CHLORIDE, CALCIUM CHLORIDE 600; 310; 30; 20 MG/100ML; MG/100ML; MG/100ML; MG/100ML
INJECTION, SOLUTION INTRAVENOUS CONTINUOUS
Status: DISCONTINUED | OUTPATIENT
Start: 2023-05-19 | End: 2023-05-20

## 2023-05-19 RX ORDER — ACETAMINOPHEN 10 MG/ML
1000 INJECTION, SOLUTION INTRAVENOUS EVERY 8 HOURS
Status: DISCONTINUED | OUTPATIENT
Start: 2023-05-19 | End: 2023-05-20 | Stop reason: HOSPADM

## 2023-05-19 RX ORDER — KETOROLAC TROMETHAMINE 15 MG/ML
15 INJECTION, SOLUTION INTRAMUSCULAR; INTRAVENOUS EVERY 6 HOURS
Status: DISCONTINUED | OUTPATIENT
Start: 2023-05-19 | End: 2023-05-19

## 2023-05-19 RX ORDER — ONDANSETRON 2 MG/ML
INJECTION INTRAMUSCULAR; INTRAVENOUS
Status: DISCONTINUED | OUTPATIENT
Start: 2023-05-19 | End: 2023-05-19

## 2023-05-19 RX ORDER — SUCCINYLCHOLINE CHLORIDE 20 MG/ML
INJECTION INTRAMUSCULAR; INTRAVENOUS
Status: DISCONTINUED | OUTPATIENT
Start: 2023-05-19 | End: 2023-05-19

## 2023-05-19 RX ORDER — LIDOCAINE HYDROCHLORIDE 10 MG/ML
1 INJECTION, SOLUTION EPIDURAL; INFILTRATION; INTRACAUDAL; PERINEURAL ONCE AS NEEDED
Status: COMPLETED | OUTPATIENT
Start: 2023-05-19 | End: 2023-05-19

## 2023-05-19 RX ORDER — DEXMEDETOMIDINE HYDROCHLORIDE 100 UG/ML
INJECTION, SOLUTION INTRAVENOUS
Status: DISCONTINUED | OUTPATIENT
Start: 2023-05-19 | End: 2023-05-19

## 2023-05-19 RX ORDER — PROCHLORPERAZINE EDISYLATE 5 MG/ML
5 INJECTION INTRAMUSCULAR; INTRAVENOUS EVERY 6 HOURS PRN
Status: DISCONTINUED | OUTPATIENT
Start: 2023-05-19 | End: 2023-05-20 | Stop reason: HOSPADM

## 2023-05-19 RX ORDER — KETOROLAC TROMETHAMINE 30 MG/ML
30 INJECTION, SOLUTION INTRAMUSCULAR; INTRAVENOUS EVERY 6 HOURS
Status: DISCONTINUED | OUTPATIENT
Start: 2023-05-20 | End: 2023-05-20 | Stop reason: HOSPADM

## 2023-05-19 RX ORDER — MIDAZOLAM HYDROCHLORIDE 1 MG/ML
.5-4 INJECTION INTRAMUSCULAR; INTRAVENOUS
Status: DISCONTINUED | OUTPATIENT
Start: 2023-05-19 | End: 2023-05-19

## 2023-05-19 RX ORDER — PANTOPRAZOLE SODIUM 40 MG/10ML
40 INJECTION, POWDER, LYOPHILIZED, FOR SOLUTION INTRAVENOUS DAILY
Status: DISCONTINUED | OUTPATIENT
Start: 2023-05-20 | End: 2023-05-20 | Stop reason: HOSPADM

## 2023-05-19 RX ORDER — PROPOFOL 10 MG/ML
VIAL (ML) INTRAVENOUS
Status: DISCONTINUED | OUTPATIENT
Start: 2023-05-19 | End: 2023-05-19

## 2023-05-19 RX ORDER — KETAMINE HCL IN 0.9 % NACL 50 MG/5 ML
SYRINGE (ML) INTRAVENOUS
Status: DISCONTINUED | OUTPATIENT
Start: 2023-05-19 | End: 2023-05-19

## 2023-05-19 RX ORDER — OXYCODONE HYDROCHLORIDE 5 MG/1
5 TABLET ORAL
Status: DISCONTINUED | OUTPATIENT
Start: 2023-05-19 | End: 2023-05-19 | Stop reason: HOSPADM

## 2023-05-19 RX ORDER — DEXAMETHASONE SODIUM PHOSPHATE 4 MG/ML
INJECTION, SOLUTION INTRA-ARTICULAR; INTRALESIONAL; INTRAMUSCULAR; INTRAVENOUS; SOFT TISSUE
Status: DISCONTINUED | OUTPATIENT
Start: 2023-05-19 | End: 2023-05-19

## 2023-05-19 RX ORDER — LIDOCAINE HYDROCHLORIDE 20 MG/ML
INJECTION INTRAVENOUS
Status: DISCONTINUED | OUTPATIENT
Start: 2023-05-19 | End: 2023-05-19

## 2023-05-19 RX ORDER — SODIUM CHLORIDE 9 MG/ML
INJECTION, SOLUTION INTRAVENOUS CONTINUOUS
Status: DISCONTINUED | OUTPATIENT
Start: 2023-05-19 | End: 2023-05-19

## 2023-05-19 RX ORDER — BUPIVACAINE HYDROCHLORIDE 7.5 MG/ML
INJECTION, SOLUTION EPIDURAL; RETROBULBAR
Status: COMPLETED | OUTPATIENT
Start: 2023-05-19 | End: 2023-05-19

## 2023-05-19 RX ADMIN — ONDANSETRON 4 MG: 2 INJECTION INTRAMUSCULAR; INTRAVENOUS at 11:05

## 2023-05-19 RX ADMIN — CEFAZOLIN 2 G: 2 INJECTION, POWDER, FOR SOLUTION INTRAMUSCULAR; INTRAVENOUS at 10:05

## 2023-05-19 RX ADMIN — MIDAZOLAM HYDROCHLORIDE 2 MG: 1 INJECTION, SOLUTION INTRAMUSCULAR; INTRAVENOUS at 10:05

## 2023-05-19 RX ADMIN — SODIUM CHLORIDE: 0.9 INJECTION, SOLUTION INTRAVENOUS at 10:05

## 2023-05-19 RX ADMIN — HYDROMORPHONE HYDROCHLORIDE 0.2 MG: 1 INJECTION, SOLUTION INTRAMUSCULAR; INTRAVENOUS; SUBCUTANEOUS at 01:05

## 2023-05-19 RX ADMIN — DEXMEDETOMIDINE HYDROCHLORIDE 8 MCG: 100 INJECTION, SOLUTION INTRAVENOUS at 12:05

## 2023-05-19 RX ADMIN — SODIUM CHLORIDE, POTASSIUM CHLORIDE, SODIUM LACTATE AND CALCIUM CHLORIDE: 600; 310; 30; 20 INJECTION, SOLUTION INTRAVENOUS at 12:05

## 2023-05-19 RX ADMIN — ONDANSETRON 4 MG: 2 INJECTION INTRAMUSCULAR; INTRAVENOUS at 06:05

## 2023-05-19 RX ADMIN — KETOROLAC TROMETHAMINE 30 MG: 30 INJECTION, SOLUTION INTRAMUSCULAR; INTRAVENOUS at 12:05

## 2023-05-19 RX ADMIN — BUPIVACAINE HYDROCHLORIDE 30 ML: 7.5 INJECTION, SOLUTION EPIDURAL; RETROBULBAR at 10:05

## 2023-05-19 RX ADMIN — HYDROMORPHONE HYDROCHLORIDE 0.2 MG: 1 INJECTION, SOLUTION INTRAMUSCULAR; INTRAVENOUS; SUBCUTANEOUS at 12:05

## 2023-05-19 RX ADMIN — OXYCODONE HYDROCHLORIDE 5 MG: 5 TABLET ORAL at 04:05

## 2023-05-19 RX ADMIN — LIDOCAINE HYDROCHLORIDE 80 MG: 20 INJECTION INTRAVENOUS at 10:05

## 2023-05-19 RX ADMIN — ROCURONIUM BROMIDE 45 MG: 10 INJECTION INTRAVENOUS at 10:05

## 2023-05-19 RX ADMIN — MIDAZOLAM HYDROCHLORIDE 1 MG: 1 INJECTION, SOLUTION INTRAMUSCULAR; INTRAVENOUS at 10:05

## 2023-05-19 RX ADMIN — Medication 20 MG: at 11:05

## 2023-05-19 RX ADMIN — SODIUM CHLORIDE: 9 INJECTION, SOLUTION INTRAVENOUS at 10:05

## 2023-05-19 RX ADMIN — ROCURONIUM BROMIDE 5 MG: 10 INJECTION INTRAVENOUS at 10:05

## 2023-05-19 RX ADMIN — METHOCARBAMOL 500 MG: 100 INJECTION, SOLUTION INTRAMUSCULAR; INTRAVENOUS at 09:05

## 2023-05-19 RX ADMIN — ACETAMINOPHEN 1000 MG: 10 INJECTION INTRAVENOUS at 09:05

## 2023-05-19 RX ADMIN — LIDOCAINE HYDROCHLORIDE 2 MG: 10 INJECTION, SOLUTION EPIDURAL; INFILTRATION; INTRACAUDAL; PERINEURAL at 10:05

## 2023-05-19 RX ADMIN — SUCCINYLCHOLINE CHLORIDE 180 MG: 20 INJECTION, SOLUTION INTRAMUSCULAR; INTRAVENOUS at 10:05

## 2023-05-19 RX ADMIN — ACETAMINOPHEN 1000 MG: 10 INJECTION INTRAVENOUS at 11:05

## 2023-05-19 RX ADMIN — SUGAMMADEX 200 MG: 100 INJECTION, SOLUTION INTRAVENOUS at 12:05

## 2023-05-19 RX ADMIN — DEXMEDETOMIDINE HYDROCHLORIDE 8 MCG: 100 INJECTION, SOLUTION INTRAVENOUS at 10:05

## 2023-05-19 RX ADMIN — PROPOFOL 40 MG: 10 INJECTION, EMULSION INTRAVENOUS at 11:05

## 2023-05-19 RX ADMIN — KETOROLAC TROMETHAMINE 15 MG: 15 INJECTION, SOLUTION INTRAMUSCULAR; INTRAVENOUS at 06:05

## 2023-05-19 RX ADMIN — METHOCARBAMOL 500 MG: 100 INJECTION, SOLUTION INTRAMUSCULAR; INTRAVENOUS at 01:05

## 2023-05-19 RX ADMIN — GABAPENTIN 600 MG: 300 CAPSULE ORAL at 09:05

## 2023-05-19 RX ADMIN — SODIUM CHLORIDE, POTASSIUM CHLORIDE, SODIUM LACTATE AND CALCIUM CHLORIDE: 600; 310; 30; 20 INJECTION, SOLUTION INTRAVENOUS at 11:05

## 2023-05-19 RX ADMIN — PROPOFOL 170 MG: 10 INJECTION, EMULSION INTRAVENOUS at 10:05

## 2023-05-19 RX ADMIN — DEXAMETHASONE SODIUM PHOSPHATE 8 MG: 4 INJECTION, SOLUTION INTRAMUSCULAR; INTRAVENOUS at 11:05

## 2023-05-19 RX ADMIN — OXYCODONE HYDROCHLORIDE 5 MG: 5 TABLET ORAL at 12:05

## 2023-05-19 RX ADMIN — KETOROLAC TROMETHAMINE 30 MG: 30 INJECTION, SOLUTION INTRAMUSCULAR; INTRAVENOUS at 11:05

## 2023-05-19 RX ADMIN — METRONIDAZOLE 500 MG: 500 INJECTION, SOLUTION INTRAVENOUS at 11:05

## 2023-05-19 NOTE — H&P
FOCUSED SURGICAL H&P    [facility] Karen Rodríguez is a 28 y.o. female. MRN is [id number].    CC: Here today for the following surgical procedure(s):  XI ROBOTIC PYLOROPLASTY vs. Pyloromyotomy (Abdomen)    HPI: For a detailed history of the patients history of present illness please refer to the last progress note. In brief, this is a 28 y.o. female with a known history of gastroparesis, here today for surgical intervention. There has been no recent changes in the patients health, including fevers, chest pain, or shortness of breath, and no new medications have been started. The patient has not had anything to eat or drink for the last 8 hours.      Past Medical History:   Past Medical History:   Diagnosis Date    Allergic sinusitis     Anxiety     Digestive disorder     ACID REFLUX    Dyspareunia due to medical condition in female     Endometriosis     GERD (gastroesophageal reflux disease)     Pelvic pain in female     Postpartum depression     Syncope     CHRONIC/RECURRING    UTI (urinary tract infection)     H/O       Past Surgical History:   Past Surgical History:   Procedure Laterality Date    COLONOSCOPY N/A 1/30/2020    Procedure: COLONOSCOPY;  Surgeon: Jonathon Chisholm MD;  Location: [facility];  Service: Endoscopy;  Laterality: N/A;    epidural for childbirth      ESOPHAGOGASTRODUODENOSCOPY N/A 1/30/2020    Procedure: ESOPHAGOGASTRODUODENOSCOPY (EGD);  Surgeon: Jonathon Chisholm MD;  Location: [facility];  Service: Endoscopy;  Laterality: N/A;    HYSTERECTOMY      INSERTION, PEG TUBE  3/24/2023    Procedure: INSERTION, PEG TUBE;  Surgeon: Kelvin Durán MD;  Location: [address];  Service: General;;    IUD placement and removal      LAPAROSCOPIC CHOLECYSTECTOMY N/A 12/14/2021    Procedure: CHOLECYSTECTOMY, LAPAROSCOPIC;  Surgeon: Phillip Nguyen MD;  Location: [facility];  Service: General;  Laterality: N/A;    LAPAROSCOPIC SALPINGECTOMY Bilateral 7/27/2021    Procedure: SALPINGECTOMY,  LAPAROSCOPIC, bilateral;  Surgeon: Lev Giles Jr., MD;  Location: [facility] OR;  Service: OB/GYN;  Laterality: Bilateral;    LAPAROSCOPIC TOTAL HYSTERECTOMY N/A 2021    Procedure: HYSTERECTOMY, TOTAL, LAPAROSCOPIC;  Surgeon: Lev Giles Jr., MD;  Location: [facility] OR;  Service: OB/GYN;  Laterality: N/A;  11:30 per Omari, negative     PELVIC LAPAROSCOPY      POSTPARTUM LIGATION OF FALLOPIAN TUBE Bilateral 2020    Procedure: LIGATION, FALLOPIAN TUBE, POSTPARTUM;  Surgeon: Lev Giles Jr., MD;  Location: [facility] OR;  Service: OB/GYN;  Laterality: Bilateral;    WISDOM TOOTH EXTRACTION      XI ROBOTIC JEJUNOSTOMY N/A 3/24/2023    Procedure: XI ROBOTIC JEJUNOSTOMY;  Surgeon: Kelvin Durán MD;  Location: [address];  Service: General;  Laterality: N/A;  NO NARCOTICS       Social History:   Social History     Socioeconomic History    Marital status: Single   Tobacco Use    Smoking status: Former     Types: Cigarettes     Quit date:      Years since quittin.3    Smokeless tobacco: Never    Tobacco comments:     Currently smokes medical marijuana    Substance and Sexual Activity    Alcohol use: Not Currently     Comment: occasionally    Drug use: Yes     Types: Marijuana     Comment: medical marijuana    Sexual activity: Yes     Partners: Male     Birth control/protection: None, See Surgical Hx     Social Determinants of Health     Financial Resource Strain: Low Risk     Difficulty of Paying Living Expenses: Not hard at all   Food Insecurity: No Food Insecurity    Worried About Running Out of Food in the Last Year: Never true    Ran Out of Food in the Last Year: Never true   Transportation Needs: No Transportation Needs    Lack of Transportation (Medical): No    Lack of Transportation (Non-Medical): No   Physical Activity: Inactive    Days of Exercise per Week: 0 days    Minutes of Exercise per Session: 0 min   Stress: No Stress Concern Present    Feeling of Stress :  Not at all   Social Connections: Unknown    Frequency of Communication with Friends and Family: Once a week    Frequency of Social Gatherings with Friends and Family: Once a week    Attends [employer] Services: Patient refused    Active Member of Clubs or Organizations: Patient refused    Attends Club or Organization Meetings: Patient refused    Marital Status: Never    Housing Stability: Low Risk     Unable to Pay for Housing in the Last Year: No    Number of Places Lived in the Last Year: 1    Unstable Housing in the Last Year: No       Family History:   Family History   Problem Relation Age of Onset    Birth defects Sister     Diabetes Maternal Grandmother     Diabetes Paternal Grandfather     Stomach cancer Paternal Grandfather     No Known Problems Mother           Allergies:  Review of patient's allergies indicates:   Allergen Reactions    Wellbutrin [bupropion hcl] Other (See Comments)     Suicidal thoughts         Medications:    Current Facility-Administered Medications:     0.9%  NaCl infusion, , Intravenous, Continuous, Kelvin Durán MD    ceFAZolin 2 g in dextrose 5 % in water (D5W) 5 % 50 mL IVPB (MB+), 2 g, Intravenous, Once, Roly Dewey MD    LIDOcaine (PF) 10 mg/ml (1%) injection 10 mg, 1 mL, Intradermal, Once PRN, Kelvin Durán MD    metronidazole IVPB 500 mg, 500 mg, Intravenous, Q8H, Roly Dewey MD    midazolam (VERSED) 1 mg/mL injection 0.5-4 mg, 0.5-4 mg, Intravenous, PRN, Steven Wolff MD    Facility-Administered Medications Ordered in Other Encounters:     ceFAZolin 2 g in dextrose 5 % in water (D5W) 5 % 50 mL IVPB (MB+), 2 g, Intravenous, On Call Procedure, Vicente Espinoza MD                  Vital Signs:  Vitals:    05/19/23 1001   BP: 110/72   Pulse: 68   Resp: 18   Temp: 98.2 °F (36.8 °C)         Physical Exam:  Neuro: awake, alert, no acute distress.  HEENT: PERRLA, neck supple, no lymphadenopathy.  Heart: regular rate/rhythm  Lungs: equal chest  expansion bilaterally, no increased work of breathing on RA  Abdomen: soft, non-distended, non-tender to palpation.  Extremities: warm, well-perfused       Labs:  Lab Results   Component Value Date/Time    WBC 13.32 (H) 04/05/2023 07:32 PM    HGB 12.7 04/05/2023 07:32 PM    HCT 38.2 04/05/2023 07:32 PM    HCT 40 02/07/2023 11:50 AM     04/05/2023 07:32 PM    MCV 91 04/05/2023 07:32 PM     Lab Results   Component Value Date/Time     04/05/2023 07:32 PM    K 4.0 04/05/2023 07:32 PM     04/05/2023 07:32 PM    CO2 28 04/05/2023 07:32 PM    BUN 9 04/05/2023 07:32 PM    GLU 86 04/05/2023 07:32 PM    MG 1.9 03/26/2023 09:29 AM    PHOS 2.7 03/26/2023 09:29 AM     Lab Results   Component Value Date/Time    INR 1.0 05/16/2020 04:52 PM     No components found for: TROPI  Lab Results   Component Value Date/Time    ALT 6 (L) 04/05/2023 07:32 PM    AST 13 04/05/2023 07:32 PM    LIPASE 15 04/05/2023 07:32 PM          Assessment/Plan:  28 y.o. female here today for the following surgical procedure:    XI ROBOTIC PYLOROPLASTY vs. Pyloromyotomy (Abdomen)       The indications for surgery, highlighting the risks and benefits of the procedure were discussed with the patient. These included but are not limited to swelling, bleeding, pain, infection, and adverse anesthesia-related event. I also discussed risk of injury to nearby structures. The patient seems to understand the risks, as well as the alternatives including nonoperative observation/survellience and wishes to proceed with the surgical intervention.    Patient has been examined and consented.    Roly Dewey MD  General Surgery, PGY 1

## 2023-05-19 NOTE — ANESTHESIA PREPROCEDURE EVALUATION
05/19/2023  [facility] Karen Rodríguez is a 28 y.o., female.      Pre-op Assessment    I have reviewed the Patient Summary Reports.          Review of Systems  Anesthesia Hx:  No problems with previous Anesthesia    Social:  Non-Smoker    Hematology/Oncology:  Hematology Normal   Oncology Normal     EENT/Dental:EENT/Dental Normal   Cardiovascular:  Cardiovascular Normal     Pulmonary:  Pulmonary Normal    Renal/:  Renal/ Normal     Hepatic/GI:   GERD (Gastroparesis)    Musculoskeletal:  Musculoskeletal Normal    Neurological:  Neurology Normal    Endocrine:  Endocrine Normal    Dermatological:  Skin Normal    Psych:   anxiety          Physical Exam  General: Alert and Oriented    Airway:  Mallampati: II / II  Mouth Opening: Normal  TM Distance: Normal  Tongue: Normal  Neck ROM: Normal ROM    Dental:  Intact    Chest/Lungs:  Clear to auscultation, Normal Respiratory Rate    Heart:  Rate: Normal  Rhythm: Regular Rhythm  Sounds: Normal        Anesthesia Plan  Type of Anesthesia, risks & benefits discussed:    Anesthesia Type: Gen ETT  Intra-op Monitoring Plan: Standard ASA Monitors  Post Op Pain Control Plan: multimodal analgesia and peripheral nerve block  Airway Plan: Direct  Informed Consent: Informed consent signed with the Patient and all parties understand the risks and agree with anesthesia plan.  All questions answered.   ASA Score: 3    Ready For Surgery From Anesthesia Perspective.     .

## 2023-05-19 NOTE — BRIEF OP NOTE
Operative Note       Surgery Date: 5/19/2023     Surgeon(s) and Role:     * Kelvin Durán MD - Primary     * Roly Dewey MD - Resident - Assisting    Pre-op Diagnosis:  Gastroparesis [K31.84]    Post-op Diagnosis:  Gastroparesis [K31.84]    Procedure(s) (LRB):  XI ROBOTIC PYLOROMYOTOMY (N/A)    Anesthesia: * No anesthesia type entered *    Procedure in Detail/Findings:  Moderately thick pylorus.  Without apparent complication.    Estimated Blood Loss: Minimal           Specimens (From admission, onward)      None          Implants: * No implants in log *           Disposition: PACU - hemodynamically stable.           Condition: Good    Attestation:  I was present and scrubbed for the entire procedure.

## 2023-05-19 NOTE — BRIEF OP NOTE
[facility] (2nd Fl)  Brief Operative Note    SUMMARY     Surgery Date: 5/19/2023     Surgeon(s) and Role:     * Kelvin Durán MD - Primary     * Roly Dewey MD - Resident - Assisting    Pre-op Diagnosis:  Gastroparesis [K31.84]    Post-op Diagnosis:  Post-Op Diagnosis Codes:     * Gastroparesis [K31.84]    Procedure(s) (LRB):  XI ROBOTIC PYLOROMYOTOMY (N/A)    Anesthesia: General    Operative Findings: pyloroplasty performed without incident    Estimated Blood Loss: * No values recorded between 5/19/2023 11:08 AM and 5/19/2023 12:05 PM *    Estimated Blood Loss has not been documented. EBL = <5cc.         Specimens:   Specimen (24h ago, onward)      None            [id number]

## 2023-05-19 NOTE — TRANSFER OF CARE
"Anesthesia Transfer of Care Note    Patient: Tess Rodríguez    Procedure(s) Performed: Procedure(s) (LRB):  XI ROBOTIC PYLOROMYOTOMY (N/A)    Patient location: PACU    Anesthesia Type: general    Transport from OR: Transported from OR on 6-10 L/min O2 by face mask with adequate spontaneous ventilation    Post pain: adequate analgesia    Post assessment: no apparent anesthetic complications and tolerated procedure well    Post vital signs: stable    Level of consciousness: awake    Nausea/Vomiting: no nausea/vomiting    Complications: none    Transfer of care protocol was followed      Last vitals:   Visit Vitals  BP (!) 114/54   Pulse (P) 98   Temp (P) 36.5 °C (97.7 °F) (Temporal)   Resp (P) 16   Ht 5' 5" (1.651 m)   Wt 70.8 kg (156 lb)   LMP 10/06/2021   SpO2 (P) 100%   Breastfeeding No   BMI 25.96 kg/m²     "

## 2023-05-19 NOTE — ANESTHESIA PROCEDURE NOTES
Intubation    Date/Time: 5/19/2023 10:48 AM  Performed by: Miah Alonzo CRNA  Authorized by: Fredrick Escalona MD     Intubation:     Induction:  Intravenous    Intubated:  Postinduction    Mask Ventilation:  Not attempted    Attempts:  1    Attempted By:  CRNA    Method of Intubation:  Video laryngoscopy    Blade:  Thomas 3    Laryngeal View Grade: Grade I - full view of cords      Difficult Airway Encountered?: No      Complications:  None    Airway Device:  Oral endotracheal tube    Airway Device Size:  7.5    Style/Cuff Inflation:  Cuffed (inflated to minimal occlusive pressure)    Inflation Amount (mL):  6    Tube secured:  21    Secured at:  The lips    Placement Verified By:  Capnometry    Complicating Factors:  None    Findings Post-Intubation:  BS equal bilateral and atraumatic/condition of teeth unchanged

## 2023-05-19 NOTE — OP NOTE
DATE OF PROCEDURE: 5/19/2023    PRE OP DIAGNOSIS: Gastroparesis [K31.84]    POST OP DIAGNOSIS: Gastroparesis [K31.84]    PROCEDURE: Procedure(s) (LRB):  XI ROBOTIC PYLOROMYOTOMY (N/A)    Surgeon(s) and Role:     * Kelvin Durán MD - Primary     * Roly Dewey MD - Resident - Assisting    ANESTHESIA: General.     PROCEDURE IN DETAIL:  The patient was placed under general anesthesia.  The   abdomen was prepped and draped in the usual manner.  Access to peritoneum was   gained through the umbilicus using the Optiview trocar under direct vision.    Pneumoperitoneum to 15 mmHg with CO2 gas was obtained.  The robotic trocars   were placed under direct vision at 8 and 18 cm from the primary trocar on the   right side and 10 cm from the primary trocar in the left side and then the primary trocar was replaced with a robotic trocar under direct vision.  We used two   cautiers on the patient's right side and a hook cautery on the left to start our initial   dissection of the pylorus and take largely the serosa.  We then exchanged the #2 and #4 arms to forceps and [place] to divided the pylorus   muscle.  Once this was completely divided and the mucosa was seen, we inspected   the area for hemostasis.  The instruments were then removed from the abdomen and the robot was un-docked.    The trocars were removed under direct vision.  Prior to removing the   last trocar, pneumoperitoneum was allowed to escape.  The fascia at the naval   was closed with 0 Vicryl.  The skin incisions were closed with 4-0 plain catgut   and reinforced with skin glue.  The patient tolerated the procedure well and was   brought to Recovery Room in stable condition.  Sponge and needle counts were   correct at the end of the case.    BLOOD LOSS:  Minimal.    COMPLICATIONS:  None.    PATHOLOGY:  None.

## 2023-05-19 NOTE — ANESTHESIA PROCEDURE NOTES
Bilat TRISTON Single Shots    Patient location during procedure: OR   Block not for primary anesthetic.  Reason for block: at surgeon's request and post-op pain management   Post-op Pain Location: Abdomen   Start time: 5/19/2023 10:40 AM  Timeout: 5/19/2023 10:39 AM   End time: 5/19/2023 10:44 AM    Staffing  Authorizing Provider: Kishan Holt MD  Performing Provider: Steven Wolff MD    Preanesthetic Checklist  Completed: patient identified, IV checked, site marked, risks and benefits discussed, surgical consent, monitors and equipment checked, pre-op evaluation and timeout performed  Peripheral Block  Patient position: sitting  Prep: ChloraPrep  Patient monitoring: heart rate, cardiac monitor, continuous pulse ox, continuous capnometry and frequent blood pressure checks  Block type: erector spinae plane  Laterality: bilateral  Injection technique: single shot  Interspace: T7-8    Needle  Needle type: Tuohy   Needle gauge: 17 G  Needle length: 3.5 in  Needle localization: anatomical landmarks and ultrasound guidance   -ultrasound image captured on disc.  Assessment  Injection assessment: negative aspiration, negative parasthesia and local visualized surrounding nerve  Paresthesia pain: none  Heart rate change: no  Slow fractionated injection: yes  Pain Tolerance: comfortable throughout block and no complaints  Medications:    Medications: bupivacaine (pf) (MARCAINE) injection 0.75% - Perineural   30 mL - 5/19/2023 10:45:00 AM    Additional Notes  Patient tolerated well.  [facility]  Bilat TRISTON Single Shots. 30ml 0.375% bupi each side

## 2023-05-20 VITALS
WEIGHT: 156 LBS | TEMPERATURE: 98 F | HEART RATE: 73 BPM | OXYGEN SATURATION: 99 % | DIASTOLIC BLOOD PRESSURE: 60 MMHG | RESPIRATION RATE: 16 BRPM | BODY MASS INDEX: 25.99 KG/M2 | HEIGHT: 65 IN | SYSTOLIC BLOOD PRESSURE: 100 MMHG

## 2023-05-20 LAB
ANION GAP SERPL CALC-SCNC: 7 MMOL/L (ref 8–16)
BASOPHILS # BLD AUTO: 0.02 K/UL (ref 0–0.2)
BASOPHILS NFR BLD: 0.2 % (ref 0–1.9)
BUN SERPL-MCNC: 12 MG/DL (ref 6–20)
CALCIUM SERPL-MCNC: 8.1 MG/DL (ref 8.7–10.5)
CHLORIDE SERPL-SCNC: 110 MMOL/L (ref 95–110)
CO2 SERPL-SCNC: 22 MMOL/L (ref 23–29)
CREAT SERPL-MCNC: 0.7 MG/DL (ref 0.5–1.4)
DIFFERENTIAL METHOD: ABNORMAL
EOSINOPHIL # BLD AUTO: 0 K/UL (ref 0–0.5)
EOSINOPHIL NFR BLD: 0.1 % (ref 0–8)
ERYTHROCYTE [DISTWIDTH] IN BLOOD BY AUTOMATED COUNT: 13.2 % (ref 11.5–14.5)
EST. GFR  (NO RACE VARIABLE): >60 ML/MIN/1.73 M^2
GLUCOSE SERPL-MCNC: 105 MG/DL (ref 70–110)
HCT VFR BLD AUTO: 37.3 % (ref 37–48.5)
HGB BLD-MCNC: 11.9 G/DL (ref 12–16)
IMM GRANULOCYTES # BLD AUTO: 0.05 K/UL (ref 0–0.04)
IMM GRANULOCYTES NFR BLD AUTO: 0.4 % (ref 0–0.5)
LYMPHOCYTES # BLD AUTO: 1.4 K/UL (ref 1–4.8)
LYMPHOCYTES NFR BLD: 11 % (ref 18–48)
MAGNESIUM SERPL-MCNC: 1.9 MG/DL (ref 1.6–2.6)
MCH RBC QN AUTO: 29.9 PG (ref 27–31)
MCHC RBC AUTO-ENTMCNC: 31.9 G/DL (ref 32–36)
MCV RBC AUTO: 94 FL (ref 82–98)
MONOCYTES # BLD AUTO: 0.5 K/UL (ref 0.3–1)
MONOCYTES NFR BLD: 3.7 % (ref 4–15)
NEUTROPHILS # BLD AUTO: 10.9 K/UL (ref 1.8–7.7)
NEUTROPHILS NFR BLD: 84.6 % (ref 38–73)
NRBC BLD-RTO: 0 /100 WBC
PHOSPHATE SERPL-MCNC: 4.3 MG/DL (ref 2.7–4.5)
PLATELET # BLD AUTO: 187 K/UL (ref 150–450)
PMV BLD AUTO: 10.8 FL (ref 9.2–12.9)
POTASSIUM SERPL-SCNC: 4.5 MMOL/L (ref 3.5–5.1)
RBC # BLD AUTO: 3.98 M/UL (ref 4–5.4)
SODIUM SERPL-SCNC: 139 MMOL/L (ref 136–145)
WBC # BLD AUTO: 12.89 K/UL (ref 3.9–12.7)

## 2023-05-20 PROCEDURE — 63600175 PHARM REV CODE 636 W HCPCS: Performed by: STUDENT IN AN ORGANIZED HEALTH CARE EDUCATION/TRAINING PROGRAM

## 2023-05-20 PROCEDURE — 80048 BASIC METABOLIC PNL TOTAL CA: CPT | Performed by: STUDENT IN AN ORGANIZED HEALTH CARE EDUCATION/TRAINING PROGRAM

## 2023-05-20 PROCEDURE — 25000003 PHARM REV CODE 250

## 2023-05-20 PROCEDURE — 25000003 PHARM REV CODE 250: Performed by: STUDENT IN AN ORGANIZED HEALTH CARE EDUCATION/TRAINING PROGRAM

## 2023-05-20 PROCEDURE — 85025 COMPLETE CBC W/AUTO DIFF WBC: CPT | Performed by: STUDENT IN AN ORGANIZED HEALTH CARE EDUCATION/TRAINING PROGRAM

## 2023-05-20 PROCEDURE — 84100 ASSAY OF PHOSPHORUS: CPT | Performed by: STUDENT IN AN ORGANIZED HEALTH CARE EDUCATION/TRAINING PROGRAM

## 2023-05-20 PROCEDURE — 83735 ASSAY OF MAGNESIUM: CPT | Performed by: STUDENT IN AN ORGANIZED HEALTH CARE EDUCATION/TRAINING PROGRAM

## 2023-05-20 PROCEDURE — 36415 COLL VENOUS BLD VENIPUNCTURE: CPT | Performed by: STUDENT IN AN ORGANIZED HEALTH CARE EDUCATION/TRAINING PROGRAM

## 2023-05-20 PROCEDURE — C9113 INJ PANTOPRAZOLE SODIUM, VIA: HCPCS | Performed by: STUDENT IN AN ORGANIZED HEALTH CARE EDUCATION/TRAINING PROGRAM

## 2023-05-20 PROCEDURE — 63600175 PHARM REV CODE 636 W HCPCS

## 2023-05-20 RX ORDER — ACETAMINOPHEN 160 MG/5ML
1000 LIQUID ORAL EVERY 8 HOURS
Refills: 0 | COMMUNITY
Start: 2023-05-20

## 2023-05-20 RX ORDER — METHOCARBAMOL 500 MG/1
500 TABLET, FILM COATED ORAL 4 TIMES DAILY
Qty: 20 TABLET | Refills: 0 | Status: SHIPPED | OUTPATIENT
Start: 2023-05-20 | End: 2023-05-25

## 2023-05-20 RX ORDER — KETOROLAC TROMETHAMINE 10 MG/1
10 TABLET, FILM COATED ORAL EVERY 6 HOURS PRN
Qty: 12 TABLET | Refills: 0 | Status: SHIPPED | OUTPATIENT
Start: 2023-05-20 | End: 2023-05-23

## 2023-05-20 RX ADMIN — METHOCARBAMOL 500 MG: 100 INJECTION, SOLUTION INTRAMUSCULAR; INTRAVENOUS at 06:05

## 2023-05-20 RX ADMIN — FLUOXETINE 40 MG: 20 CAPSULE ORAL at 06:05

## 2023-05-20 RX ADMIN — ACETAMINOPHEN 1000 MG: 10 INJECTION INTRAVENOUS at 06:05

## 2023-05-20 RX ADMIN — PANTOPRAZOLE SODIUM 40 MG: 40 INJECTION, POWDER, FOR SOLUTION INTRAVENOUS at 08:05

## 2023-05-20 RX ADMIN — KETOROLAC TROMETHAMINE 30 MG: 30 INJECTION, SOLUTION INTRAMUSCULAR; INTRAVENOUS at 06:05

## 2023-05-20 RX ADMIN — LIDOCAINE 1 PATCH: 50 PATCH CUTANEOUS at 08:05

## 2023-05-20 RX ADMIN — ONDANSETRON 4 MG: 2 INJECTION INTRAMUSCULAR; INTRAVENOUS at 11:05

## 2023-05-20 RX ADMIN — KETOROLAC TROMETHAMINE 30 MG: 30 INJECTION, SOLUTION INTRAMUSCULAR; INTRAVENOUS at 11:05

## 2023-05-20 RX ADMIN — ONDANSETRON 4 MG: 2 INJECTION INTRAMUSCULAR; INTRAVENOUS at 06:05

## 2023-05-20 RX ADMIN — GABAPENTIN 600 MG: 300 CAPSULE ORAL at 08:05

## 2023-05-20 NOTE — DISCHARGE SUMMARY
[facility]  General Surgery  Discharge Summary      Patient Name: Tess Rodríguez  MRN: [id number]  Admission Date: 5/19/2023  Hospital Length of Stay: 1 days  Discharge Date and Time:  05/20/2023 10:09 AM  Attending Physician: Kelvin Durán MD   Discharging Provider: Catina Kim MD  Primary Care Provider: Eusebio Escamilla MD    HPI: For a detailed history of the patients history of present illness please refer to the last progress note. In brief, this is a 28 y.o. female with a known history of gastroparesis, here today for surgical intervention. There has been no recent changes in the patients health, including fevers, chest pain, or shortness of breath, and no new medications have been started. The patient has not had anything to eat or drink for the last 8 hours.       Procedure(s) (LRB):  XI ROBOTIC PYLOROMYOTOMY (N/A)      Indwelling Lines/Drains at time of discharge:   Lines/Drains/Airways       Drain  Duration                  Gastrostomy/Enterostomy 03/24/23 Jejunostomy tube LUQ 57 days         Gastrostomy/Enterostomy 03/24/23 1109 Percutaneous endoscopic gastrostomy (PEG) LUQ feeding 56 days                  Hospital Course: TESS RODRÍGUEZ 28 y.o.female underwent: Procedure(s) (LRB):  XI ROBOTIC PYLOROMYOTOMY (N/A). Tolerated procedure well, was transferred to  then to regular floor post op. Please see the dictated operative note for further procedure details.   Hospital course was uncomplicated.   Vitals remained stable, afebrile. Labs were reviewed and electrolytes replaced appropriately.   Physical exam was appropriate for post operative state.   Was able to tolerate a liquid diet as it was advanced in an appropriate surgical manner along the myotomy pathway.   Was able to ambulate and void without difficulty prior to discharge.  Pain and nausea controlled with PRN medications.   Deemed suitable for discharge on 1 Day Post-Op      Discharge Physical Exam  Physical  Exam  Vitals reviewed.   HENT:      Head: Normocephalic and atraumatic.   Eyes:      Extraocular Movements: Extraocular movements intact.      Conjunctiva/sclera: Conjunctivae normal.   Cardiovascular:      Rate and Rhythm: Normal rate.      Pulses: Normal pulses.   Abdominal:      General: Abdomen is flat. There is no distension.      Palpations: Abdomen is soft.      Tenderness: There is abdominal tenderness (mildly tender surrounding incisions, appropriate).      Comments: Incisions clean and dry with dermabond overlying, no surrounding erythema or induration.    Musculoskeletal:         General: No swelling. Normal range of motion.      Cervical back: Normal range of motion and neck supple.   Skin:     General: Skin is warm and dry.   Neurological:      General: No focal deficit present.      Mental Status: She is alert and oriented to person, place, and time.         Goals of Care Treatment Preferences:  Code Status: Full Code      Consults:   Consults (From admission, onward)          Status Ordering Provider     Inpatient consult to Registered Dietitian/Nutritionist  Once        Provider:  (Not yet assigned)    Acknowledged KINGSTON GARZA            Significant Diagnostic Studies: Labs:   BMP:   Recent Labs   Lab 05/20/23 0317         K 4.5      CO2 22*   BUN 12   CREATININE 0.7   CALCIUM 8.1*   MG 1.9    and CBC   Recent Labs   Lab 05/20/23 0317   WBC 12.89*   HGB 11.9*   HCT 37.3          Pending Diagnostic Studies:       None          Final Active Diagnoses:    Diagnosis Date Noted POA    PRINCIPAL PROBLEM:  Gastroparesis [K31.84] 03/16/2023 Yes      Problems Resolved During this Admission:      Discharged Condition: good    Disposition: Home or Self Care    Follow Up:   Follow-up Information       Kelvin Durán MD. Go in 2 week(s).    Specialties: General Surgery, Bariatrics  Why: Post-operative follow up  Contact information:  [address]  [place]  [address]  [phone]                           Patient Instructions:      Lifting restrictions   Order Comments: No lifting more than 10 lbs for 6 weeks.     Notify your health care provider if you experience any of the following:  increased confusion or weakness     Notify your health care provider if you experience any of the following:  persistent dizziness, light-headedness, or visual disturbances     Notify your health care provider if you experience any of the following:  worsening rash     Notify your health care provider if you experience any of the following:  severe persistent headache     Notify your health care provider if you experience any of the following:  difficulty breathing or increased cough     Notify your health care provider if you experience any of the following:  redness, tenderness, or signs of infection (pain, swelling, redness, odor or green/yellow discharge around incision site)     Notify your health care provider if you experience any of the following:  severe uncontrolled pain     Notify your health care provider if you experience any of the following:  persistent nausea and vomiting or diarrhea     Notify your health care provider if you experience any of the following:  temperature >100.4     No dressing needed   Order Comments: Okay to take shower and get incision wet in 48 hours. Do NOT soak/submerge incision in water (aka no bathing, swimming, etc) for 2 weeks. Skin glue overlying incision will fall off on its own in 2-3 weeks.     Medications:  Reconciled Home Medications:      Medication List        START taking these medications      * acetaminophen 160 mg/5 mL Liqd  Commonly known as: TYLENOL  Take 31.3 mLs (1,001.6 mg total) by mouth every 8 (eight) hours.     ketorolac 10 mg tablet  Commonly known as: TORADOL  Take 1 tablet (10 mg total) by mouth every 6 (six) hours as needed for Pain.     methocarbamoL 500 MG Tab  Commonly known as: ROBAXIN  Take 1 tablet (500 mg total) by  mouth 4 (four) times daily. for 5 days           * This list has 1 medication(s) that are the same as other medications prescribed for you. Read the directions carefully, and ask your doctor or other care provider to review them with you.                CONTINUE taking these medications      cetirizine 10 MG tablet  Commonly known as: ZYRTEC  Take 1 tablet (10 mg total) by mouth once daily.     FLUoxetine 40 MG capsule  Take 40 mg by mouth every morning.     fluticasone propionate 50 mcg/actuation nasal spray  Commonly known as: FLONASE  1 spray (50 mcg total) by Each Nostril route 2 (two) times daily as needed.     gabapentin 600 MG tablet  Commonly known as: NEURONTIN  Take 1 tablet (600 mg total) by mouth 3 (three) times daily.     metoclopramide HCl 10 MG tablet  Commonly known as: REGLAN  Take 1 tablet (10 mg total) by mouth every 6 (six) hours as needed.     ondansetron 4 MG tablet  Commonly known as: ZOFRAN  Take 4 mg by mouth 2 (two) times daily.     pantoprazole 40 MG tablet  Commonly known as: PROTONIX  Take 40 mg by mouth once daily.            STOP taking these medications      ibuprofen 600 MG tablet  Commonly known as: ADVIL,MOTRIN            ASK your doctor about these medications      * acetaminophen 500 MG tablet  Commonly known as: TYLENOL  Take 1 tablet (500 mg total) by mouth every 6 (six) hours as needed for Pain.           * This list has 1 medication(s) that are the same as other medications prescribed for you. Read the directions carefully, and ask your doctor or other care provider to review them with you.                Time spent on the discharge of patient: 30 minutes    Catina Kim MD  General Surgery  [facility]

## 2023-05-20 NOTE — HOSPITAL COURSE
JOSE RAGLAND 28 y.o.female underwent: Procedure(s) (LRB):  XI ROBOTIC PYLOROMYOTOMY (N/A). Tolerated procedure well, was transferred to  then to regular floor post op. Please see the dictated operative note for further procedure details.   Hospital course was uncomplicated.   Vitals remained stable, afebrile. Labs were reviewed and electrolytes replaced appropriately.   Physical exam was appropriate for post operative state.   Was able to tolerate a liquid diet as it was advanced in an appropriate surgical manner along the myotomy pathway.   Was able to ambulate and void without difficulty prior to discharge.  Pain and nausea controlled with PRN medications.   Deemed suitable for discharge on 1 Day Post-Op

## 2023-05-20 NOTE — CONSULTS
Food & Nutrition  Education    Diet Education: Post-op Gastric diet  Time Spent: 10  Learners: patient, caregiver    Nutrition Education provided with handouts: Diet After Gastric Surgery    Comments: Pt agreed to review education materials - encouraged 3 days of clears followed by 4 days of full liquids followed by 1 week of soft diet. Discussed ways to prevent stomach stretching and excess gas to avoid possible complications. All questions and concerns answered. Dietitian's contact information provided.       Follow-Up: Yes    Please Re-consult as needed.    Thanks!    Noreen Mooney, MS, RD, LDN

## 2023-05-26 PROCEDURE — G0179 PR HOME HEALTH MD RECERTIFICATION: ICD-10-PCS | Mod: ,,, | Performed by: SURGERY

## 2023-05-26 PROCEDURE — G0179 MD RECERTIFICATION HHA PT: HCPCS | Mod: ,,, | Performed by: SURGERY

## 2023-05-28 NOTE — ANESTHESIA POSTPROCEDURE EVALUATION
Anesthesia Post Evaluation    Patient: Tess Rodríguez    Procedure(s) Performed: Procedure(s) (LRB):  XI ROBOTIC PYLOROMYOTOMY (N/A)    Final Anesthesia Type: general      Patient location during evaluation: PACU  Patient participation: Yes- Able to Participate  Level of consciousness: awake and alert  Post-procedure vital signs: reviewed and stable  Pain control: Pain has been treated.  Airway patency: patent    PONV status: PONV absent or treated.  Anesthetic complications: no      Cardiovascular status: hemodynamically stable  Respiratory status: spontaneous ventilation  Hydration status: euvolemic  Follow-up not needed.          Vitals Value Taken Time   /60 05/20/23 0730   Temp 36.9 °C (98.4 °F) 05/20/23 0730   Pulse 73 05/20/23 0730   Resp 16 05/20/23 0514   SpO2 99 % 05/20/23 0730         Event Time   Out of Recovery 12:36:00         Pain/Mary Ann Score: No data recorded

## 2023-06-01 ENCOUNTER — OFFICE VISIT (OUTPATIENT)
Dept: SURGERY | Facility: CLINIC | Age: 28
End: 2023-06-01
Payer: MEDICAID

## 2023-06-01 VITALS
BODY MASS INDEX: 24.89 KG/M2 | WEIGHT: 149.38 LBS | SYSTOLIC BLOOD PRESSURE: 128 MMHG | HEART RATE: 73 BPM | DIASTOLIC BLOOD PRESSURE: 89 MMHG | HEIGHT: 65 IN

## 2023-06-01 DIAGNOSIS — K31.84 GASTROPARESIS: Primary | ICD-10-CM

## 2023-06-01 PROCEDURE — 99213 OFFICE O/P EST LOW 20 MIN: CPT | Mod: PBBFAC | Performed by: SURGERY

## 2023-06-01 PROCEDURE — 99024 PR POST-OP FOLLOW-UP VISIT: ICD-10-PCS | Mod: ,,, | Performed by: SURGERY

## 2023-06-01 PROCEDURE — 99999 PR PBB SHADOW E&M-EST. PATIENT-LVL III: ICD-10-PCS | Mod: PBBFAC,,, | Performed by: SURGERY

## 2023-06-01 PROCEDURE — 3008F PR BODY MASS INDEX (BMI) DOCUMENTED: ICD-10-PCS | Mod: CPTII,,, | Performed by: SURGERY

## 2023-06-01 PROCEDURE — 99999 PR PBB SHADOW E&M-EST. PATIENT-LVL III: CPT | Mod: PBBFAC,,, | Performed by: SURGERY

## 2023-06-01 PROCEDURE — 3079F DIAST BP 80-89 MM HG: CPT | Mod: CPTII,,, | Performed by: SURGERY

## 2023-06-01 PROCEDURE — 3074F PR MOST RECENT SYSTOLIC BLOOD PRESSURE < 130 MM HG: ICD-10-PCS | Mod: CPTII,,, | Performed by: SURGERY

## 2023-06-01 PROCEDURE — 1160F PR REVIEW ALL MEDS BY PRESCRIBER/CLIN PHARMACIST DOCUMENTED: ICD-10-PCS | Mod: CPTII,,, | Performed by: SURGERY

## 2023-06-01 PROCEDURE — 3079F PR MOST RECENT DIASTOLIC BLOOD PRESSURE 80-89 MM HG: ICD-10-PCS | Mod: CPTII,,, | Performed by: SURGERY

## 2023-06-01 PROCEDURE — 99024 POSTOP FOLLOW-UP VISIT: CPT | Mod: ,,, | Performed by: SURGERY

## 2023-06-01 PROCEDURE — 1159F PR MEDICATION LIST DOCUMENTED IN MEDICAL RECORD: ICD-10-PCS | Mod: CPTII,,, | Performed by: SURGERY

## 2023-06-01 PROCEDURE — 3008F BODY MASS INDEX DOCD: CPT | Mod: CPTII,,, | Performed by: SURGERY

## 2023-06-01 PROCEDURE — 1160F RVW MEDS BY RX/DR IN RCRD: CPT | Mod: CPTII,,, | Performed by: SURGERY

## 2023-06-01 PROCEDURE — 1159F MED LIST DOCD IN RCRD: CPT | Mod: CPTII,,, | Performed by: SURGERY

## 2023-06-01 PROCEDURE — 3074F SYST BP LT 130 MM HG: CPT | Mod: CPTII,,, | Performed by: SURGERY

## 2023-06-01 NOTE — PROGRESS NOTES
General Surgery Office Visit  Gastroparesis Follow Up    SUBJECTIVE:     History of Present Illness:  Patient is a 28 y.o. female presents for follow up of gastroparesis s/p robotic pyloroplasty on 5/19/23.   states that  symptoms {HAVE/HAVE NOT:96915} improved.     Medications:  Promotility: ***   Acid Control: ***   Nausea Control: ***   Pain Control: ***    Gastroparesis scoring: PREOP (4/6/23  Vomting:  Severity (0) /Frequency (0)  Nausea: Severity (2) /Frequency (2)  Early satiety: Severity (0) /Frequency (0)  Bloating: Severity (3) /Frequency (3)  Postprandial fullness: Severity (2) /Frequency (3)  Epigastric pain: Severity (4) / Frequency (4)  Epigastric burning: Severity (4) / Frequency (4)      Gastroparesis scoring: POSTOP 6/1/23  Vomting:  Severity (***) /Frequency (***)  Nausea: Severity (***) /Frequency (***)  Early satiety: Severity (***) /Frequency (***)  Bloating: Severity (***) /Frequency (***)  Postprandial fullness: Severity (***) /Frequency (***)  Epigastric pain: Severity (***) / Frequency (***)  Epigastric burning: Severity (***) / Frequency (***)    Review of patient's allergies indicates:   Allergen Reactions    Wellbutrin [bupropion hcl] Other (See Comments)     Suicidal thoughts       Current Outpatient Medications   Medication Sig Dispense Refill    acetaminophen (TYLENOL) 160 mg/5 mL Liqd Take 31.3 mLs (1,001.6 mg total) by mouth every 8 (eight) hours.  0    acetaminophen (TYLENOL) 500 MG tablet Take 1 tablet (500 mg total) by mouth every 6 (six) hours as needed for Pain. (Patient not taking: Reported on 5/1/2023)  0    cetirizine (ZYRTEC) 10 MG tablet Take 1 tablet (10 mg total) by mouth once daily. (Patient taking differently: Take 10 mg by mouth daily as needed.) 30 tablet 0    FLUoxetine 40 MG capsule Take 40 mg by mouth every morning.      fluticasone propionate (FLONASE) 50 mcg/actuation nasal spray 1 spray (50 mcg total) by Each Nostril route 2 (two) times daily as needed. 15 g 0     gabapentin (NEURONTIN) 600 MG tablet Take 1 tablet (600 mg total) by mouth 3 (three) times daily. 90 tablet 11    metoclopramide HCl (REGLAN) 10 MG tablet Take 1 tablet (10 mg total) by mouth every 6 (six) hours as needed. 12 tablet 0    ondansetron (ZOFRAN) 4 MG tablet Take 4 mg by mouth 2 (two) times daily.      pantoprazole (PROTONIX) 40 MG tablet Take 40 mg by mouth once daily.       No current facility-administered medications for this visit.         OBJECTIVE:     Vital Signs (Most Recent)              Physical Exam:  Physical Exam      ASSESSMENT/PLAN:     Tess Rodríguez is a 28 y.o. female with history of gastroparesis s/p robotic pyloroplasty on 5/19/23.      PLAN:  - Patient is recovering well  - ***  - Follow up in Clinic PRN. Call clinic for any questions, concerns or change in symptoms.     Roly Dewey MD  General Surgery PGY-1

## 2023-06-01 NOTE — PROGRESS NOTES
General Surgery Office Visit  Gastroparesis Follow Up    SUBJECTIVE:     History of Present Illness:  Patient is a 28 y.o. female, BMI 24, and history of GERD and gastroparesis presents for follow up s/p XI ROBOTIC PYLOROMYOTOMY on 5/19. Symptoms have improved since surgery. She is having nausea daily, has used Zofran and Phenergan which do not help with nausea. Had 1 episode of emesis since surgery, vomiting has improved since placement of j-tube. States after about 3 bites of food she feels full. Daily bloating which lasts a few hours after eating. Has tightness to epigastric region. Alternates between hard and soft stools, currently having soft stools 3-4x a day. Unintentional 7 pound weight loss since 5/19, limiting what she eats due to pain. No longer using j-tube for feeds. Uses medical marijuana which helps with abdominal pain and increases hunger. Denies current use of narcotics.    Not currently employed.     Promotility Regalan 10mg q6 hours PRN /Acid Control Pepcid and Protonix 40 mg daily / Nausea Control Zofran 4mg and Phenergan / Pain Control Gabapentin 600mg PRN for pain.     Gastroparesis scoring (6/1/23)  Vomting:  Severity (0) /Frequency (0)  Nausea: Severity (2) /Frequency (3)  Early satiety: Severity (2) /Frequency (3)  Bloating: Severity (3) /Frequency (3)  Postprandial fullness: Severity (4) /Frequency (4)  Epigastric pain: Severity (3) / Frequency (3)  Epigastric burning: Severity (2) / Frequency (3)      GERD Questionnaire   Protonix PPI  After eating Typical heartburn  Occasional Regurgitation  no Dysphagia solids  no Dysphagia liquids  Past 6 months Hoarseness  Past 6 months Sore throat  no Cough  no Asthma  no Chest pain  no Water brash  All day long Globus  Daily Nausea  no Vomiting    Gastroparesis scoring: PREOP (4/6/23)  Vomting:  Severity (0) /Frequency (0)  Nausea: Severity (2) /Frequency (2)  Early satiety: Severity (0) /Frequency (0)  Bloating: Severity (3) /Frequency  (3)  Postprandial fullness: Severity (2) /Frequency (3)  Epigastric pain: Severity (4) / Frequency (4)  Epigastric burning: Severity (4) / Frequency (4)      Gastroparesis scoring (3/16/23)  Vomting:  Severity (3) /Frequency (4)  Nausea: Severity (3) /Frequency (4)  Early satiety: Severity (3) /Frequency (4)  Bloating: Severity (3) /Frequency (2)  Postprandial fullness: Severity (3) /Frequency (3)  Epigastric pain: Severity (3) / Frequency (4)  Epigastric burning: Severity (3) / Frequency (3)     No chief complaint on file.      Review of patient's allergies indicates:   Allergen Reactions    Wellbutrin [bupropion hcl] Other (See Comments)     Suicidal thoughts       Current Outpatient Medications   Medication Sig Dispense Refill    acetaminophen (TYLENOL) 160 mg/5 mL Liqd Take 31.3 mLs (1,001.6 mg total) by mouth every 8 (eight) hours.  0    acetaminophen (TYLENOL) 500 MG tablet Take 1 tablet (500 mg total) by mouth every 6 (six) hours as needed for Pain.  0    cetirizine (ZYRTEC) 10 MG tablet Take 1 tablet (10 mg total) by mouth once daily. (Patient taking differently: Take 10 mg by mouth daily as needed.) 30 tablet 0    FLUoxetine 40 MG capsule Take 40 mg by mouth every morning.      fluticasone propionate (FLONASE) 50 mcg/actuation nasal spray 1 spray (50 mcg total) by Each Nostril route 2 (two) times daily as needed. 15 g 0    gabapentin (NEURONTIN) 600 MG tablet Take 1 tablet (600 mg total) by mouth 3 (three) times daily. 90 tablet 11    metoclopramide HCl (REGLAN) 10 MG tablet Take 1 tablet (10 mg total) by mouth every 6 (six) hours as needed. 12 tablet 0    ondansetron (ZOFRAN) 4 MG tablet Take 4 mg by mouth 2 (two) times daily.      pantoprazole (PROTONIX) 40 MG tablet Take 40 mg by mouth once daily.       No current facility-administered medications for this visit.       Past Medical History:   Diagnosis Date    Allergic sinusitis     Anxiety     Digestive disorder     ACID REFLUX    Dyspareunia due to  medical condition in female     Endometriosis     GERD (gastroesophageal reflux disease)     Pelvic pain in female     Postpartum depression     Syncope     CHRONIC/RECURRING    UTI (urinary tract infection)     H/O     Past Surgical History:   Procedure Laterality Date    COLONOSCOPY N/A 1/30/2020    Procedure: COLONOSCOPY;  Surgeon: Jonathon Chisholm MD;  Location: [facility];  Service: Endoscopy;  Laterality: N/A;    epidural for childbirth      ESOPHAGOGASTRODUODENOSCOPY N/A 1/30/2020    Procedure: ESOPHAGOGASTRODUODENOSCOPY (EGD);  Surgeon: Jonathon Chisholm MD;  Location: [facility];  Service: Endoscopy;  Laterality: N/A;    HYSTERECTOMY      INSERTION, PEG TUBE  3/24/2023    Procedure: INSERTION, PEG TUBE;  Surgeon: Kelvin Durán MD;  Location: [address];  Service: General;;    IUD placement and removal      LAPAROSCOPIC CHOLECYSTECTOMY N/A 12/14/2021    Procedure: CHOLECYSTECTOMY, LAPAROSCOPIC;  Surgeon: Phillip Nguyen MD;  Location: [facility];  Service: General;  Laterality: N/A;    LAPAROSCOPIC SALPINGECTOMY Bilateral 7/27/2021    Procedure: SALPINGECTOMY, LAPAROSCOPIC, bilateral;  Surgeon: Lev Giles Jr., MD;  Location: [facility] OR;  Service: OB/GYN;  Laterality: Bilateral;    LAPAROSCOPIC TOTAL HYSTERECTOMY N/A 7/27/2021    Procedure: HYSTERECTOMY, TOTAL, LAPAROSCOPIC;  Surgeon: Lev Giles Jr., MD;  Location: [facility] OR;  Service: OB/GYN;  Laterality: N/A;  11:30 per Omari, negative 07/20    PELVIC LAPAROSCOPY      POSTPARTUM LIGATION OF FALLOPIAN TUBE Bilateral 9/30/2020    Procedure: LIGATION, FALLOPIAN TUBE, POSTPARTUM;  Surgeon: Lev Giles Jr., MD;  Location: [facility] OR;  Service: OB/GYN;  Laterality: Bilateral;    WISDOM TOOTH EXTRACTION      XI ROBOTIC JEJUNOSTOMY N/A 3/24/2023    Procedure: XI ROBOTIC JEJUNOSTOMY;  Surgeon: Kelvin Durán MD;  Location: [address];  Service: General;  Laterality: N/A;  NO NARCOTICS    XI ROBOTIC  "PYLOROMYOTOMY N/A 2023    Procedure: XI ROBOTIC PYLOROMYOTOMY;  Surgeon: Kelvin Durán MD;  Location: [facility] OR [address] [place] [address];  Service: General;  Laterality: N/A;     Family History   Problem Relation Age of Onset    Birth defects Sister     Diabetes Maternal Grandmother     Diabetes Paternal Grandfather     Stomach cancer Paternal Grandfather     No Known Problems Mother      Social History     Tobacco Use    Smoking status: Former     Types: Cigarettes     Quit date:      Years since quittin.4    Smokeless tobacco: Never    Tobacco comments:     Currently smokes medical marijuana    Substance Use Topics    Alcohol use: Not Currently     Comment: occasionally    Drug use: Yes     Types: Marijuana     Comment: medical marijuana        Review of Systems:  Review of Systems   Constitutional:  Negative for fever.   Gastrointestinal:  Positive for abdominal pain, constipation, diarrhea and nausea. Negative for abdominal distention and vomiting.   Genitourinary:  Negative for dysuria.     OBJECTIVE:     Vital Signs (Most Recent)  Pulse: 73 (23 0938)  BP: 128/89 (23 0938)  5' 5" (1.651 m)  67.8 kg (149 lb 5.8 oz)     Physical Exam:  Physical Exam  Constitutional:       General: She is not in acute distress.     Appearance: Normal appearance. She is not ill-appearing, toxic-appearing or diaphoretic.   HENT:      Head: Normocephalic and atraumatic.   Abdominal:      General: Abdomen is flat. There is no distension.      Palpations: Abdomen is soft.      Tenderness: There is no abdominal tenderness.   Neurological:      Mental Status: She is alert.       Laboratory  CBC: Reviewed  CMP: Reviewed    Diagnostic Results:    Gastric Tube check   - Nonobstructed bowel-gas pattern.  G-tube tip located over the stomach.  Injected contrast pools in the gastric lumen with trace amount of contrast passing into the proximal duodenum.  No extravasation of contrast identified.  - Cholecystectomy clips right " upper quadrant.  Surgical clip in the pelvis.    CT Abdomen 2023  1. Peg tube in the stomach  2. Previous cholecystectomy  3. There is a catheter in the left abdomen question if this is a drainage catheter  4. No bowel obstruction    XRAY Abdomen 2023  - Supine and erect abdominal radiographs demonstrate no dilated loops of bowel.  No free air.  No evidence of organomegaly.  No renal stones.  No osseous abnormality    FL Upper GI 2022  - Delayed gastric emptying consistent with the clinical diagnosis of gastro paresis.  - Normal appearing small bowel.  - Questionable area in the cervical esophagus.  Would consider endoscopy.    EGD 2020  - Delayed gastric emptying consistent with the clinical diagnosis of gastro paresis.  - Normal appearing small bowel.  - Questionable area in the cervical esophagus.  Would consider endoscopy.    GES 2022  - Delayed gastric emptying as described above with gastric emptying at 119 minutes of 10% and at 241 minutes at 61%.  - Note: Normal values for gastric emptying at 120 minutes are between 40 and 70% and at 240 minutes are between 90 and 100%     ASSESSMENT/PLAN:     Hillivana Rodríguez is a 28 y.o. female who presents for f/u s/p XI ROBOTIC PYLOROMYOTOMY on 5/19. Symptoms have improved. She is still having daily nausea and had a 7 pound weight loss since 5/19.    PLAN:    - GES 3 months  - 1 month follow up     Chelsea KAM

## 2023-06-06 ENCOUNTER — EXTERNAL HOME HEALTH (OUTPATIENT)
Dept: HOME HEALTH SERVICES | Facility: HOSPITAL | Age: 28
End: 2023-06-06
Payer: MEDICAID

## 2023-06-07 ENCOUNTER — TELEPHONE (OUTPATIENT)
Dept: SURGERY | Facility: CLINIC | Age: 28
End: 2023-06-07
Payer: MEDICAID

## 2023-06-07 NOTE — TELEPHONE ENCOUNTER
Spoke with patient.  She was on her way to ER when I contacted her earlier.  Went to ER - tube placed and is working fine.  Advised to call for needs.  Pt verbalized understanding to all and has no further questions at this time.

## 2023-06-07 NOTE — TELEPHONE ENCOUNTER
----- Message from Tamara Pederson sent at 6/7/2023  8:39 AM CDT -----  Regarding: Feeding Tube Came Out  Contact: [phone]  Pt states she rolled over and her feeding tube came out.  She put it back in and hasn't flushed it yet.  Wants to know if she should go to the ER.  Please call.

## 2023-06-28 ENCOUNTER — DOCUMENT SCAN (OUTPATIENT)
Dept: HOME HEALTH SERVICES | Facility: HOSPITAL | Age: 28
End: 2023-06-28
Payer: MEDICAID

## 2023-06-29 ENCOUNTER — DOCUMENT SCAN (OUTPATIENT)
Dept: HOME HEALTH SERVICES | Facility: HOSPITAL | Age: 28
End: 2023-06-29
Payer: MEDICAID

## 2023-07-06 ENCOUNTER — OFFICE VISIT (OUTPATIENT)
Dept: SURGERY | Facility: CLINIC | Age: 28
End: 2023-07-06
Payer: MEDICAID

## 2023-07-06 VITALS
WEIGHT: 152 LBS | BODY MASS INDEX: 25.33 KG/M2 | SYSTOLIC BLOOD PRESSURE: 124 MMHG | HEIGHT: 65 IN | DIASTOLIC BLOOD PRESSURE: 76 MMHG | HEART RATE: 91 BPM

## 2023-07-06 DIAGNOSIS — K31.84 GASTROPARESIS: Primary | ICD-10-CM

## 2023-07-06 PROCEDURE — 99213 OFFICE O/P EST LOW 20 MIN: CPT | Mod: PBBFAC | Performed by: SURGERY

## 2023-07-06 PROCEDURE — 3074F SYST BP LT 130 MM HG: CPT | Mod: CPTII,,, | Performed by: SURGERY

## 2023-07-06 PROCEDURE — 99999 PR PBB SHADOW E&M-EST. PATIENT-LVL III: CPT | Mod: PBBFAC,,, | Performed by: SURGERY

## 2023-07-06 PROCEDURE — 1159F MED LIST DOCD IN RCRD: CPT | Mod: CPTII,,, | Performed by: SURGERY

## 2023-07-06 PROCEDURE — 99999 PR PBB SHADOW E&M-EST. PATIENT-LVL III: ICD-10-PCS | Mod: PBBFAC,,, | Performed by: SURGERY

## 2023-07-06 PROCEDURE — 1159F PR MEDICATION LIST DOCUMENTED IN MEDICAL RECORD: ICD-10-PCS | Mod: CPTII,,, | Performed by: SURGERY

## 2023-07-06 PROCEDURE — 1160F RVW MEDS BY RX/DR IN RCRD: CPT | Mod: CPTII,,, | Performed by: SURGERY

## 2023-07-06 PROCEDURE — 99024 POSTOP FOLLOW-UP VISIT: CPT | Mod: ,,, | Performed by: SURGERY

## 2023-07-06 PROCEDURE — 3008F BODY MASS INDEX DOCD: CPT | Mod: CPTII,,, | Performed by: SURGERY

## 2023-07-06 PROCEDURE — 3078F PR MOST RECENT DIASTOLIC BLOOD PRESSURE < 80 MM HG: ICD-10-PCS | Mod: CPTII,,, | Performed by: SURGERY

## 2023-07-06 PROCEDURE — 3008F PR BODY MASS INDEX (BMI) DOCUMENTED: ICD-10-PCS | Mod: CPTII,,, | Performed by: SURGERY

## 2023-07-06 PROCEDURE — 3074F PR MOST RECENT SYSTOLIC BLOOD PRESSURE < 130 MM HG: ICD-10-PCS | Mod: CPTII,,, | Performed by: SURGERY

## 2023-07-06 PROCEDURE — 3078F DIAST BP <80 MM HG: CPT | Mod: CPTII,,, | Performed by: SURGERY

## 2023-07-06 PROCEDURE — 1160F PR REVIEW ALL MEDS BY PRESCRIBER/CLIN PHARMACIST DOCUMENTED: ICD-10-PCS | Mod: CPTII,,, | Performed by: SURGERY

## 2023-07-06 PROCEDURE — 99024 PR POST-OP FOLLOW-UP VISIT: ICD-10-PCS | Mod: ,,, | Performed by: SURGERY

## 2023-07-06 NOTE — PROGRESS NOTES
29y/o with gerd and gastroparesis s/p peg with j tube 3/24/23 and s/p robotic pyloromyotomy 5/19/23.  Her symptoms are slightly worse than last visit but she is eating more and gaining weight.  She has severe to extremely severe epigastric pain, moderate to extremely severe nausea and mild vomiting.  She has not been to the ER when the j tube fell out and it was in place.  She wants her j tube removed.      shows one narcotics rx in 2/2022 but none since.       She is not able to work due to symptoms.     PE soft     Cbc, cmp basically ok  Ugi with small bowel 2022 reviewed, films viewed    -Delayed gastric emptying consistent with the clinical diagnosis of gastro paresis.    -Normal appearing small bowel.    -Questionable area in the cervical esophagus.  Would consider endoscopy.  Ct 2022 reviewed, films viewed    -No abnormality.    -Stomach appears ok  Ct 2023 (for peg tube site pain), reviewed, films viewed    1. Peg tube in the stomach, no abscess and appears appropriately placed to me    2. Previous cholecystectomy    3. There is a catheter in the left abdomen question if this is a drainage catheter    4. No bowel obstruction  Ges 2022 reviewed    -At 241 minutes, gastric emptying was noted to be 61%, c/w gastroparesis  Egd 2020 reviewed    - Normal esophagus.     - Gastritis. Biopsied.     - Normal examined duodenum.     - Biopsies were taken with a cold forceps for         histology in the second portion of the duodenum.   Ekg 2023 reviewed    -Normal     S/p pyloric procedure for gastroparesis with significant improvement in symptoms but still with significant symptoms.  Rtc September with ges and egd with consideration of gastric stimulator if we can get it covered.

## 2023-08-07 ENCOUNTER — TELEPHONE (OUTPATIENT)
Dept: ENDOSCOPY | Facility: HOSPITAL | Age: 28
End: 2023-08-07
Payer: MEDICAID

## 2023-08-07 VITALS — WEIGHT: 149 LBS | HEIGHT: 65 IN | BODY MASS INDEX: 24.83 KG/M2

## 2023-08-07 DIAGNOSIS — R47.02 DYSPHASIA: ICD-10-CM

## 2023-08-07 DIAGNOSIS — K31.84 GASTROPARESIS: Primary | ICD-10-CM

## 2023-08-07 NOTE — TELEPHONE ENCOUNTER
Spoke to pt to schedule procedure(s) Esophageal Manometry       Physician to perform procedure(s) Dr. KOREY Fischer  Date of Procedure (s) 10/2/23  Arrival Time 9:00 AM  Time of Procedure(s) 10:00 AM   Location of Procedure(s) [place] [address] Floor  Type of Rx Prep sent to patient: N/A  Instructions provided to patient via [facility]    Patient was informed on the following information and verbalized understanding. Screening questionnaire reviewed with patient and complete. If procedure requires anesthesia, a responsible adult needs to be present to accompany the patient home, patient cannot drive after receiving anesthesia. Appointment details are tentative, especially check-in time. Patient will receive a prep-op call 4 days prior to confirm check-in time for procedure. If applicable the patient should contact their pharmacy to verify Rx for procedure prep is ready for pick-up. Patient was advised to call the scheduling department at [phone] if pharmacy states no Rx is available. Patient was advised to call the endoscopy scheduling department if any questions or concerns arise.      [facility] Scheduling Department

## 2023-08-17 ENCOUNTER — HOSPITAL ENCOUNTER (OUTPATIENT)
Dept: RADIOLOGY | Facility: HOSPITAL | Age: 28
Discharge: HOME | End: 2023-08-17
Attending: SURGERY
Payer: MEDICAID

## 2023-08-17 DIAGNOSIS — K31.84 GASTROPARESIS: ICD-10-CM

## 2023-08-17 PROCEDURE — 78264 NM GASTRIC EMPTYING: ICD-10-PCS | Mod: 26,,, | Performed by: STUDENT IN AN ORGANIZED HEALTH CARE EDUCATION/TRAINING PROGRAM

## 2023-08-17 PROCEDURE — 78264 GASTRIC EMPTYING IMG STUDY: CPT | Mod: TC

## 2023-08-17 PROCEDURE — 78264 GASTRIC EMPTYING IMG STUDY: CPT | Mod: 26,,, | Performed by: STUDENT IN AN ORGANIZED HEALTH CARE EDUCATION/TRAINING PROGRAM

## 2023-08-22 ENCOUNTER — OFFICE VISIT (OUTPATIENT)
Dept: SURGERY | Facility: CLINIC | Age: 28
End: 2023-08-22
Payer: MEDICAID

## 2023-08-22 VITALS
WEIGHT: 148.25 LBS | BODY MASS INDEX: 24.7 KG/M2 | HEIGHT: 65 IN | DIASTOLIC BLOOD PRESSURE: 71 MMHG | SYSTOLIC BLOOD PRESSURE: 111 MMHG

## 2023-08-22 DIAGNOSIS — K31.84 GASTROPARESIS: Primary | ICD-10-CM

## 2023-08-22 PROCEDURE — 3008F BODY MASS INDEX DOCD: CPT | Mod: CPTII,,, | Performed by: SURGERY

## 2023-08-22 PROCEDURE — 3078F PR MOST RECENT DIASTOLIC BLOOD PRESSURE < 80 MM HG: ICD-10-PCS | Mod: CPTII,,, | Performed by: SURGERY

## 2023-08-22 PROCEDURE — 3078F DIAST BP <80 MM HG: CPT | Mod: CPTII,,, | Performed by: SURGERY

## 2023-08-22 PROCEDURE — 3074F PR MOST RECENT SYSTOLIC BLOOD PRESSURE < 130 MM HG: ICD-10-PCS | Mod: CPTII,,, | Performed by: SURGERY

## 2023-08-22 PROCEDURE — 1159F PR MEDICATION LIST DOCUMENTED IN MEDICAL RECORD: ICD-10-PCS | Mod: CPTII,,, | Performed by: SURGERY

## 2023-08-22 PROCEDURE — 3008F PR BODY MASS INDEX (BMI) DOCUMENTED: ICD-10-PCS | Mod: CPTII,,, | Performed by: SURGERY

## 2023-08-22 PROCEDURE — 99213 OFFICE O/P EST LOW 20 MIN: CPT | Mod: PBBFAC | Performed by: SURGERY

## 2023-08-22 PROCEDURE — 99214 OFFICE O/P EST MOD 30 MIN: CPT | Mod: S$PBB,,, | Performed by: SURGERY

## 2023-08-22 PROCEDURE — 3074F SYST BP LT 130 MM HG: CPT | Mod: CPTII,,, | Performed by: SURGERY

## 2023-08-22 PROCEDURE — 99214 PR OFFICE/OUTPT VISIT, EST, LEVL IV, 30-39 MIN: ICD-10-PCS | Mod: S$PBB,,, | Performed by: SURGERY

## 2023-08-22 PROCEDURE — 99999 PR PBB SHADOW E&M-EST. PATIENT-LVL III: CPT | Mod: PBBFAC,,, | Performed by: SURGERY

## 2023-08-22 PROCEDURE — 1160F PR REVIEW ALL MEDS BY PRESCRIBER/CLIN PHARMACIST DOCUMENTED: ICD-10-PCS | Mod: CPTII,,, | Performed by: SURGERY

## 2023-08-22 PROCEDURE — 1160F RVW MEDS BY RX/DR IN RCRD: CPT | Mod: CPTII,,, | Performed by: SURGERY

## 2023-08-22 PROCEDURE — 99999 PR PBB SHADOW E&M-EST. PATIENT-LVL III: ICD-10-PCS | Mod: PBBFAC,,, | Performed by: SURGERY

## 2023-08-22 PROCEDURE — 1159F MED LIST DOCD IN RCRD: CPT | Mod: CPTII,,, | Performed by: SURGERY

## 2023-08-22 NOTE — PROGRESS NOTES
Surgery Clinic Note - H and P    Subjective:     Tess Rodríguez is a 28 y.o. female with h/o GERD and gastroparesis who presents to clinic for discussion of gastric stimulator placement.      *** patient reports that {he/she:19114} is tolerating a regular diet and having regular bowel movements.  {He/She:69718} denies fever or chills.        Gastroparesis scoring  Vomting:  Severity (***) /Frequency (***)  Nausea: Severity (***) /Frequency (***)  Early satiety: Severity (***) /Frequency (***)  Bloating: Severity (***) /Frequency (***)  Postprandial fullness: Severity (***) /Frequency (***)  Epigastric pain: Severity (***) / Frequency (***)  Epigastric burning: Severity (***) / Frequency (***)      Medications:  Gabapentin: 600mg TID  Reglan:10 mg q6h  Zofran: 4 mg BID  Protonix 40 mg once daily      PMH:   Past Medical History:   Diagnosis Date    Allergic sinusitis     Anxiety     Digestive disorder     ACID REFLUX    Dyspareunia due to medical condition in female     Endometriosis     GERD (gastroesophageal reflux disease)     Pelvic pain in female     Postpartum depression     Syncope     CHRONIC/RECURRING    UTI (urinary tract infection)     H/O       Past Surgical History:   Past Surgical History:   Procedure Laterality Date    COLONOSCOPY N/A 1/30/2020    Procedure: COLONOSCOPY;  Surgeon: Jonathon Chisholm MD;  Location: [facility];  Service: Endoscopy;  Laterality: N/A;    epidural for childbirth      ESOPHAGOGASTRODUODENOSCOPY N/A 1/30/2020    Procedure: ESOPHAGOGASTRODUODENOSCOPY (EGD);  Surgeon: Jonathon Chisholm MD;  Location: [facility];  Service: Endoscopy;  Laterality: N/A;    ESOPHAGOGASTRODUODENOSCOPY N/A 6/2/2023    Procedure: EGD (ESOPHAGOGASTRODUODENOSCOPY);  Surgeon: Estephania Jaimes MD;  Location: [facility];  Service: Endoscopy;  Laterality: N/A;    HYSTERECTOMY      INSERTION, PEG TUBE  3/24/2023    Procedure: INSERTION, PEG TUBE;  Surgeon: Kelvin Durán MD;  Location:  [facility] OR 2ND FLR;  Service: General;;    IUD placement and removal      LAPAROSCOPIC CHOLECYSTECTOMY N/A 2021    Procedure: CHOLECYSTECTOMY, LAPAROSCOPIC;  Surgeon: Phillip Nguyen MD;  Location: [facility];  Service: General;  Laterality: N/A;    LAPAROSCOPIC SALPINGECTOMY Bilateral 2021    Procedure: SALPINGECTOMY, LAPAROSCOPIC, bilateral;  Surgeon: Lev Giles Jr., MD;  Location: [facility] OR;  Service: OB/GYN;  Laterality: Bilateral;    LAPAROSCOPIC TOTAL HYSTERECTOMY N/A 2021    Procedure: HYSTERECTOMY, TOTAL, LAPAROSCOPIC;  Surgeon: Lev Giles Jr., MD;  Location: [facility] OR;  Service: OB/GYN;  Laterality: N/A;  11:30 per Omari, negative     PELVIC LAPAROSCOPY      POSTPARTUM LIGATION OF FALLOPIAN TUBE Bilateral 2020    Procedure: LIGATION, FALLOPIAN TUBE, POSTPARTUM;  Surgeon: Lev Giles Jr., MD;  Location: [facility] OR;  Service: OB/GYN;  Laterality: Bilateral;    WISDOM TOOTH EXTRACTION      XI ROBOTIC JEJUNOSTOMY N/A 3/24/2023    Procedure: XI ROBOTIC JEJUNOSTOMY;  Surgeon: Kelvin Durán MD;  Location: [facility] OR [address] [place] [address];  Service: General;  Laterality: N/A;  NO NARCOTICS    XI ROBOTIC PYLOROMYOTOMY N/A 2023    Procedure: XI ROBOTIC PYLOROMYOTOMY;  Surgeon: Kelvin Durán MD;  [facility];  Service: General;  Laterality: N/A;       Social History:  Social History     Socioeconomic History    Marital status: Single   Tobacco Use    Smoking status: Former     Current packs/day: 0.00     Types: Cigarettes     Quit date:      Years since quittin.6    Smokeless tobacco: Never    Tobacco comments:     Currently smokes medical marijuana    Substance and Sexual Activity    Alcohol use: Not Currently     Comment: occasionally    Drug use: Yes     Types: Marijuana     Comment: medical marijuana    Sexual activity: Yes     Partners: Male     Birth control/protection: None, See Surgical Hx     Social Determinants of Health     Financial  Resource Strain: Low Risk  (10/19/2022)    Overall Financial Resource Strain (CARDIA)     Difficulty of Paying Living Expenses: Not hard at all   Food Insecurity: No Food Insecurity (10/19/2022)    Hunger Vital Sign     Worried About Running Out of Food in the Last Year: Never true     Ran Out of Food in the Last Year: Never true   Transportation Needs: No Transportation Needs (10/19/2022)    PRAPARE - Transportation     Lack of Transportation (Medical): No     Lack of Transportation (Non-Medical): No   Physical Activity: Inactive (10/19/2022)    Exercise Vital Sign     Days of Exercise per Week: 0 days     Minutes of Exercise per Session: 0 min   Stress: No Stress Concern Present (10/19/2022)    [employer] [place] [employer] - Occupational Stress Questionnaire     Feeling of Stress : Not at all   Social Connections: Unknown (10/19/2022)    Social Connection and Isolation Panel [NHANES]     Frequency of Communication with Friends and Family: Once a week     Frequency of Social Gatherings with Friends and Family: Once a week     Attends Evangelical Services: Patient refused     Active Member of Clubs or Organizations: Patient refused     Attends Club or Organization Meetings: Patient refused     Marital Status: Never    Housing Stability: Low Risk  (10/19/2022)    Housing Stability Vital Sign     Unable to Pay for Housing in the Last Year: No     Number of Places Lived in the Last Year: 1     Unstable Housing in the Last Year: No       Allergies:   Review of patient's allergies indicates:   Allergen Reactions    Wellbutrin [bupropion hcl] Other (See Comments)     Suicidal thoughts       Medications:  Current Outpatient Medications:     acetaminophen (TYLENOL) 160 mg/5 mL Liqd, Take 31.3 mLs (1,001.6 mg total) by mouth every 8 (eight) hours., Disp: , Rfl: 0    acetaminophen (TYLENOL) 500 MG tablet, Take 1 tablet (500 mg total) by mouth every 6 (six) hours as needed for Pain., Disp: , Rfl: 0     cetirizine (ZYRTEC) 10 MG tablet, Take 1 tablet (10 mg total) by mouth once daily. (Patient taking differently: Take 10 mg by mouth daily as needed.), Disp: 30 tablet, Rfl: 0    FLUoxetine 40 MG capsule, Take 40 mg by mouth every morning., Disp: , Rfl:     fluticasone propionate (FLONASE) 50 mcg/actuation nasal spray, 1 spray (50 mcg total) by Each Nostril route 2 (two) times daily as needed., Disp: 15 g, Rfl: 0    gabapentin (NEURONTIN) 600 MG tablet, Take 1 tablet (600 mg total) by mouth 3 (three) times daily., Disp: 90 tablet, Rfl: 11    metoclopramide HCl (REGLAN) 10 MG tablet, Take 1 tablet (10 mg total) by mouth every 6 (six) hours as needed., Disp: 12 tablet, Rfl: 0    ondansetron (ZOFRAN) 4 MG tablet, Take 4 mg by mouth 2 (two) times daily., Disp: , Rfl:     pantoprazole (PROTONIX) 40 MG tablet, Take 40 mg by mouth once daily., Disp: , Rfl:     Current Outpatient Medications on File Prior to Visit   Medication Sig Dispense Refill    acetaminophen (TYLENOL) 160 mg/5 mL Liqd Take 31.3 mLs (1,001.6 mg total) by mouth every 8 (eight) hours.  0    acetaminophen (TYLENOL) 500 MG tablet Take 1 tablet (500 mg total) by mouth every 6 (six) hours as needed for Pain.  0    cetirizine (ZYRTEC) 10 MG tablet Take 1 tablet (10 mg total) by mouth once daily. (Patient taking differently: Take 10 mg by mouth daily as needed.) 30 tablet 0    FLUoxetine 40 MG capsule Take 40 mg by mouth every morning.      fluticasone propionate (FLONASE) 50 mcg/actuation nasal spray 1 spray (50 mcg total) by Each Nostril route 2 (two) times daily as needed. 15 g 0    gabapentin (NEURONTIN) 600 MG tablet Take 1 tablet (600 mg total) by mouth 3 (three) times daily. 90 tablet 11    metoclopramide HCl (REGLAN) 10 MG tablet Take 1 tablet (10 mg total) by mouth every 6 (six) hours as needed. 12 tablet 0    ondansetron (ZOFRAN) 4 MG tablet Take 4 mg by mouth 2 (two) times daily.      pantoprazole (PROTONIX) 40 MG tablet Take 40 mg by mouth once  daily.       No current facility-administered medications on file prior to visit.         Objective:     PHYSICAL EXAM:  Vital Signs (Most Recent)       ROS A 10+ review of systems was performed with pertinent positives and negatives noted above in the history of present illness.  Other systems were negative unless otherwise specified.    Physical Exam     Pathology- reviewed    Specimens (From admission, onward)      None                 Pertinent imaging/labs:   ***      Assessment:     28 y.o. female with ***    Plan:     - ***  - *** Return to clinic as needed    Consuelo Lopez MD   [facility]

## 2023-08-22 NOTE — PROGRESS NOTES
General Surgery Office Visit  Gastroparesis Follow Up    SUBJECTIVE:     History of Present Illness:  Patient is a 28 y.o. female presents with follow up for Gastroparesis s/p peg with j tube 3/24/23 and robotic pyloromyotomy on 5/19/23. She states that the vomiting has improved, however all the other symptoms are about the same. She reports still feeling nauseous everyday even with zofran. She reports continued epigastric pain and early satiety. She is not eating much and is having fluctuating weight.  She is having dysphagia to solids, she is on Protonix 40 mg BID. She is scheduled for an esophageal manometry with impedence in October.       Promotility same /Acid Control same / Nausea Control same / Pain Control same    Gastroparesis scoring  Vomiting:  Severity (1) /Frequency (1)  Nausea: Severity (3) /Frequency (3)  Early satiety: Severity (3) /Frequency (3)  Bloating: Severity (3) /Frequency (4)  Postprandial fullness: Severity (3) /Frequency (4)  Epigastric pain: Severity (3) / Frequency (4)  Epigastric burning: Severity (2) / Frequency (2)      GERD Questionnaire     + PPI  - Typical heartburn  + Regurgitation  + Dysphagia solids  - Dysphagia liquids  + Hoarseness  + Sore throat  + Cough  - Asthma  - Chest pain  - Water brash  + Globus  + Nausea  - Vomiting     GES 8/17/23: At 4 hour(s) the percentage of retention is 5 % (normal retention at 4 hours is 10% and lower).       Review of patient's allergies indicates:   Allergen Reactions    Wellbutrin [bupropion hcl] Other (See Comments)     Suicidal thoughts       Current Outpatient Medications   Medication Sig Dispense Refill    acetaminophen (TYLENOL) 160 mg/5 mL Liqd Take 31.3 mLs (1,001.6 mg total) by mouth every 8 (eight) hours.  0    acetaminophen (TYLENOL) 500 MG tablet Take 1 tablet (500 mg total) by mouth every 6 (six) hours as needed for Pain.  0    cetirizine (ZYRTEC) 10 MG tablet Take 1 tablet (10 mg total) by mouth once daily. (Patient taking  differently: Take 10 mg by mouth daily as needed.) 30 tablet 0    FLUoxetine 40 MG capsule Take 40 mg by mouth every morning.      fluticasone propionate (FLONASE) 50 mcg/actuation nasal spray 1 spray (50 mcg total) by Each Nostril route 2 (two) times daily as needed. 15 g 0    gabapentin (NEURONTIN) 600 MG tablet Take 1 tablet (600 mg total) by mouth 3 (three) times daily. 90 tablet 11    metoclopramide HCl (REGLAN) 10 MG tablet Take 1 tablet (10 mg total) by mouth every 6 (six) hours as needed. 12 tablet 0    ondansetron (ZOFRAN) 4 MG tablet Take 4 mg by mouth 2 (two) times daily.      pantoprazole (PROTONIX) 40 MG tablet Take 40 mg by mouth once daily.       No current facility-administered medications for this visit.       Past Medical History:   Diagnosis Date    Allergic sinusitis     Anxiety     Digestive disorder     ACID REFLUX    Dyspareunia due to medical condition in female     Endometriosis     GERD (gastroesophageal reflux disease)     Pelvic pain in female     Postpartum depression     Syncope     CHRONIC/RECURRING    UTI (urinary tract infection)     H/O     Past Surgical History:   Procedure Laterality Date    COLONOSCOPY N/A 1/30/2020    Procedure: COLONOSCOPY;  Surgeon: Jonathon Chisholm MD;  Location: [facility];  Service: Endoscopy;  Laterality: N/A;    epidural for childbirth      ESOPHAGOGASTRODUODENOSCOPY N/A 1/30/2020    Procedure: ESOPHAGOGASTRODUODENOSCOPY (EGD);  Surgeon: Jonathon Chisholm MD;  Location: [facility];  Service: Endoscopy;  Laterality: N/A;    ESOPHAGOGASTRODUODENOSCOPY N/A 6/2/2023    Procedure: EGD (ESOPHAGOGASTRODUODENOSCOPY);  Surgeon: Estephania Jaimes MD;  Location: [facility];  Service: Endoscopy;  Laterality: N/A;    HYSTERECTOMY      INSERTION, PEG TUBE  3/24/2023    Procedure: INSERTION, PEG TUBE;  Surgeon: Kelvin Durán MD;  Location: [address];  Service: General;;    IUD placement and removal      LAPAROSCOPIC CHOLECYSTECTOMY N/A  2021    Procedure: CHOLECYSTECTOMY, LAPAROSCOPIC;  Surgeon: Phillip Nguyen MD;  Location: [facility];  Service: General;  Laterality: N/A;    LAPAROSCOPIC SALPINGECTOMY Bilateral 2021    Procedure: SALPINGECTOMY, LAPAROSCOPIC, bilateral;  Surgeon: Lev Giles Jr., MD;  Location: [facility];  Service: OB/GYN;  Laterality: Bilateral;    LAPAROSCOPIC TOTAL HYSTERECTOMY N/A 2021    Procedure: HYSTERECTOMY, TOTAL, LAPAROSCOPIC;  Surgeon: Lev Giles Jr., MD;  Location: [facility];  Service: OB/GYN;  Laterality: N/A;  11:30 per Omari, negative     PELVIC LAPAROSCOPY      POSTPARTUM LIGATION OF FALLOPIAN TUBE Bilateral 2020    Procedure: LIGATION, FALLOPIAN TUBE, POSTPARTUM;  Surgeon: Lev Giles Jr., MD;  Location: [facility];  Service: OB/GYN;  Laterality: Bilateral;    WISDOM TOOTH EXTRACTION      XI ROBOTIC JEJUNOSTOMY N/A 3/24/2023    Procedure: XI ROBOTIC JEJUNOSTOMY;  Surgeon: Kelvin Durán MD;  Location: [address];  Service: General;  Laterality: N/A;  NO NARCOTICS    XI ROBOTIC PYLOROMYOTOMY N/A 2023    Procedure: XI ROBOTIC PYLOROMYOTOMY;  Surgeon: Kelvin Durán MD;  Location: [address];  Service: General;  Laterality: N/A;     Family History   Problem Relation Age of Onset    Birth defects Sister     Diabetes Maternal Grandmother     Diabetes Paternal Grandfather     Stomach cancer Paternal Grandfather     No Known Problems Mother      Social History     Tobacco Use    Smoking status: Former     Current packs/day: 0.00     Types: Cigarettes     Quit date:      Years since quittin.6    Smokeless tobacco: Never    Tobacco comments:     Currently smokes medical marijuana    Substance Use Topics    Alcohol use: Not Currently     Comment: occasionally    Drug use: Yes     Types: Marijuana     Comment: medical marijuana        Review of Systems:  Review of Systems   Constitutional:  Positive for appetite change. Negative for chills,  "diaphoresis and fever.   HENT:  Positive for sore throat, trouble swallowing and voice change.    Eyes: Negative.    Respiratory:  Positive for cough. Negative for choking, chest tightness, shortness of breath and wheezing.    Cardiovascular:  Negative for chest pain, palpitations and leg swelling.   Gastrointestinal:  Positive for abdominal pain and nausea. Negative for abdominal distention, constipation, diarrhea and vomiting.   Genitourinary: Negative.    Musculoskeletal: Negative.    Neurological: Negative.    Psychiatric/Behavioral: Negative.         OBJECTIVE:     Vital Signs (Most Recent)  BP: 111/71 (08/22/23 0927)  5' 5" (1.651 m)  67.3 kg (148 lb 4.2 oz)     Physical Exam:  Physical Exam  Constitutional:       Appearance: Normal appearance.   HENT:      Head: Normocephalic and atraumatic.      Mouth/Throat:      Mouth: Mucous membranes are moist.      Pharynx: Oropharynx is clear.   Cardiovascular:      Rate and Rhythm: Normal rate and regular rhythm.      Pulses: Normal pulses.      Heart sounds: Normal heart sounds.   Pulmonary:      Effort: Pulmonary effort is normal. No respiratory distress.      Breath sounds: Normal breath sounds.   Abdominal:      General: Abdomen is flat. There is no distension.      Palpations: Abdomen is soft.      Comments: Slight tenderness to deep palpation epigastric region    Skin:     General: Skin is warm.      Capillary Refill: Capillary refill takes less than 2 seconds.   Neurological:      General: No focal deficit present.      Mental Status: She is alert and oriented to person, place, and time. Mental status is at baseline.   Psychiatric:         Mood and Affect: Mood normal.         Behavior: Behavior normal.             ASSESSMENT/PLAN:     Tess Rodríguez is a 28 y.o. female with history of gastroparesis s/p peg with j tube 3/24/23 and robotic pyloromyotomy on 5/19/23 and normal gastric emptying scan on 8/17/23 presenting with continued symptoms. "       PLAN:    - Gastroparesis has resolved with surgery, no surgical intervention indicated at this time   - Follow-up with GI with the esophageal manometry   - May benefit from seeing our motility GI group   - Consider pH probe   - Follow-up in clinic FLORENTINO Whitten MD   [facility]

## 2023-08-22 NOTE — PROGRESS NOTES
I have seen the patient, reviewed the Student's history and physical, assessment and plan. I have personally interviewed and examined the patient at bedside and: agree with the findings.     29y/o with gerd and gastroparesis s/p peg with j tube 3/24/23 and s/p robotic pyloromyotomy 5/19/23.      Interval history 7/6/23: Her symptoms are slightly worse than last visit but she is eating more and gaining weight.  She has severe to extremely severe epigastric pain, moderate to extremely severe nausea and mild vomiting.  She has not been to the ER when the j tube fell out and it was in place.  She wants her j tube removed.    Interval history 8/22/23: She has severe to extremely severe epigastric pain, severe nausea and mild vomiting.  Severe but improved from preop.  She appears to have significant gerd despite medication.      shows one narcotics rx in 2/2022 but none since.       She is not able to work due to symptoms.     shows no recent narcotic rx.     Preop studies  Ugi with small bowel 2022 reviewed, films viewed    -Delayed gastric emptying consistent with the clinical diagnosis of gastro paresis.    -Normal appearing small bowel.    -Questionable area in the cervical esophagus.  Would consider endoscopy.  Ct 2022 reviewed, films viewed    -No abnormality.    -Stomach appears ok  Ct 2023 (for peg tube site pain), reviewed, films viewed    1. Peg tube in the stomach, no abscess and appears appropriately placed to me    2. Previous cholecystectomy    3. There is a catheter in the left abdomen question if this is a drainage catheter    4. No bowel obstruction  Ges 2022 reviewed    -At 241 minutes, gastric emptying was noted to be 61%, c/w gastroparesis  Egd 2020 reviewed    - Normal esophagus.     - Gastritis. Biopsied.     - Normal examined duodenum.     - Biopsies were taken with a cold forceps for         histology in the second portion of the duodenum.   Ekg 2023 reviewed    -Normal    Postop  studies  Ges 8/2023 reviewed, normal  Egd  6/2023 reviewed, biliuos fluid in stomach, other findings in report     S/p pyloric procedure for gastroparesis with significant improvement in symptoms but still with significant symptoms.  She must have other factors causing her symptoms including possible gerd, sibo, rumination syndrome, etc.  She is getting motility here in October.   Follow up GI and may benefit seeing one of our motility GI group.  Consider ph probe (off meds).  Follow up after seeing our motility group and ph probe if they agree with getting it.

## 2023-09-05 ENCOUNTER — TELEPHONE (OUTPATIENT)
Dept: ENDOSCOPY | Facility: HOSPITAL | Age: 28
End: 2023-09-05

## 2023-09-05 ENCOUNTER — CLINICAL SUPPORT (OUTPATIENT)
Dept: ENDOSCOPY | Facility: HOSPITAL | Age: 28
End: 2023-09-05
Attending: SURGERY
Payer: MEDICAID

## 2023-09-05 DIAGNOSIS — K31.84 GASTROPARESIS: ICD-10-CM

## 2023-09-05 DIAGNOSIS — K31.84 GASTROPARESIS: Primary | ICD-10-CM

## 2023-09-05 NOTE — PLAN OF CARE
Spoke to patient to schedule procedure(s) Upper Endoscopy (EGD)       Physician to perform procedure(s) Dr. SCOOBY Wise  Date of Procedure (s) 9/14/23  Arrival Time 2:20 PM  Time of Procedure(s) 3:20 PM   Location of Procedure(s) [address]  Type of Rx Prep sent to patient: N/A  Instructions provided to patient via [facility]    Patient was informed on the following information and verbalized understanding. Screening questionnaire reviewed with patient and complete. If procedure requires anesthesia, a responsible adult needs to be present to accompany the patient home, patient cannot drive after receiving anesthesia. Appointment details are tentative, especially check-in time. Patient will receive a prep-op call 4 days prior to confirm check-in time for procedure. If applicable the patient should contact their pharmacy to verify Rx for procedure prep is ready for pick-up. Patient was advised to call the scheduling department at [phone] if pharmacy states no Rx is available. Patient was advised to call the endoscopy scheduling department if any questions or concerns arise.      [facility] Scheduling Department

## 2023-09-05 NOTE — TELEPHONE ENCOUNTER
Spoke to patient to schedule procedure(s) Upper Endoscopy (EGD)       Physician to perform procedure(s) Dr. SCOOBY Wise  Date of Procedure (s) 9/14/23  Arrival Time 2:20 PM  Time of Procedure(s) 3:20 PM   Location of Procedure(s) [address]  Type of Rx Prep sent to patient: N/A  Instructions provided to patient via [facility]    Patient was informed on the following information and verbalized understanding. Screening questionnaire reviewed with patient and complete. If procedure requires anesthesia, a responsible adult needs to be present to accompany the patient home, patient cannot drive after receiving anesthesia. Appointment details are tentative, especially check-in time. Patient will receive a prep-op call 4 days prior to confirm check-in time for procedure. If applicable the patient should contact their pharmacy to verify Rx for procedure prep is ready for pick-up. Patient was advised to call the scheduling department at [phone] if pharmacy states no Rx is available. Patient was advised to call the endoscopy scheduling department if any questions or concerns arise.      [facility] Scheduling Department

## 2023-09-14 ENCOUNTER — ANESTHESIA (OUTPATIENT)
Dept: ENDOSCOPY | Facility: HOSPITAL | Age: 28
End: 2023-09-14
Payer: MEDICAID

## 2023-09-14 ENCOUNTER — ANESTHESIA EVENT (OUTPATIENT)
Dept: ENDOSCOPY | Facility: HOSPITAL | Age: 28
End: 2023-09-14
Payer: MEDICAID

## 2023-09-14 ENCOUNTER — HOSPITAL ENCOUNTER (OUTPATIENT)
Facility: HOSPITAL | Age: 28
Discharge: HOME | End: 2023-09-14
Attending: INTERNAL MEDICINE | Admitting: INTERNAL MEDICINE
Payer: MEDICAID

## 2023-09-14 VITALS
HEIGHT: 65 IN | TEMPERATURE: 98 F | HEART RATE: 68 BPM | SYSTOLIC BLOOD PRESSURE: 113 MMHG | BODY MASS INDEX: 23.32 KG/M2 | RESPIRATION RATE: 18 BRPM | DIASTOLIC BLOOD PRESSURE: 82 MMHG | WEIGHT: 140 LBS | OXYGEN SATURATION: 99 %

## 2023-09-14 DIAGNOSIS — K31.84 GASTROPARESIS: ICD-10-CM

## 2023-09-14 PROCEDURE — 37000008 HC ANESTHESIA 1ST 15 MINUTES: Performed by: INTERNAL MEDICINE

## 2023-09-14 PROCEDURE — 88305 TISSUE EXAM BY PATHOLOGIST: ICD-10-PCS | Mod: 26,,, | Performed by: PATHOLOGY

## 2023-09-14 PROCEDURE — 88305 TISSUE EXAM BY PATHOLOGIST: CPT | Performed by: PATHOLOGY

## 2023-09-14 PROCEDURE — 88305 TISSUE EXAM BY PATHOLOGIST: CPT | Mod: 26,,, | Performed by: PATHOLOGY

## 2023-09-14 PROCEDURE — 37000009 HC ANESTHESIA EA ADD 15 MINS: Performed by: INTERNAL MEDICINE

## 2023-09-14 PROCEDURE — 88342 IMHCHEM/IMCYTCHM 1ST ANTB: CPT | Mod: 26,,, | Performed by: PATHOLOGY

## 2023-09-14 PROCEDURE — 27201012 HC FORCEPS, HOT/COLD, DISP: Performed by: INTERNAL MEDICINE

## 2023-09-14 PROCEDURE — 43239 EGD BIOPSY SINGLE/MULTIPLE: CPT | Performed by: INTERNAL MEDICINE

## 2023-09-14 PROCEDURE — 25000003 PHARM REV CODE 250: Performed by: NURSE ANESTHETIST, CERTIFIED REGISTERED

## 2023-09-14 PROCEDURE — 43239 EGD BIOPSY SINGLE/MULTIPLE: CPT | Mod: ,,, | Performed by: INTERNAL MEDICINE

## 2023-09-14 PROCEDURE — 88342 CHG IMMUNOCYTOCHEMISTRY: ICD-10-PCS | Mod: 26,,, | Performed by: PATHOLOGY

## 2023-09-14 PROCEDURE — 63600175 PHARM REV CODE 636 W HCPCS: Performed by: NURSE ANESTHETIST, CERTIFIED REGISTERED

## 2023-09-14 PROCEDURE — 43239 PR EGD, FLEX, W/BIOPSY, SGL/MULTI: ICD-10-PCS | Mod: ,,, | Performed by: INTERNAL MEDICINE

## 2023-09-14 PROCEDURE — E9220 PRA ENDO ANESTHESIA: ICD-10-PCS | Mod: ,,, | Performed by: NURSE ANESTHETIST, CERTIFIED REGISTERED

## 2023-09-14 PROCEDURE — 88342 IMHCHEM/IMCYTCHM 1ST ANTB: CPT | Performed by: PATHOLOGY

## 2023-09-14 PROCEDURE — E9220 PRA ENDO ANESTHESIA: HCPCS | Mod: ,,, | Performed by: NURSE ANESTHETIST, CERTIFIED REGISTERED

## 2023-09-14 RX ORDER — SODIUM CHLORIDE 9 MG/ML
INJECTION, SOLUTION INTRAVENOUS CONTINUOUS
Status: DISCONTINUED | OUTPATIENT
Start: 2023-09-14 | End: 2023-09-14 | Stop reason: HOSPADM

## 2023-09-14 RX ORDER — TOPIRAMATE 50 MG/1
50 TABLET, FILM COATED ORAL 2 TIMES DAILY
COMMUNITY

## 2023-09-14 RX ORDER — SODIUM CHLORIDE 0.9 % (FLUSH) 0.9 %
10 SYRINGE (ML) INJECTION
Status: CANCELLED | OUTPATIENT
Start: 2023-09-14

## 2023-09-14 RX ORDER — PROPOFOL 10 MG/ML
VIAL (ML) INTRAVENOUS
Status: DISCONTINUED | OUTPATIENT
Start: 2023-09-14 | End: 2023-09-14

## 2023-09-14 RX ORDER — PROPOFOL 10 MG/ML
VIAL (ML) INTRAVENOUS CONTINUOUS PRN
Status: DISCONTINUED | OUTPATIENT
Start: 2023-09-14 | End: 2023-09-14

## 2023-09-14 RX ORDER — LIDOCAINE HYDROCHLORIDE 20 MG/ML
INJECTION INTRAVENOUS
Status: DISCONTINUED | OUTPATIENT
Start: 2023-09-14 | End: 2023-09-14

## 2023-09-14 RX ORDER — SODIUM CHLORIDE 9 MG/ML
INJECTION, SOLUTION INTRAVENOUS CONTINUOUS
Status: CANCELLED | OUTPATIENT
Start: 2023-09-14

## 2023-09-14 RX ADMIN — SODIUM CHLORIDE: 0.9 INJECTION, SOLUTION INTRAVENOUS at 03:09

## 2023-09-14 RX ADMIN — LIDOCAINE HYDROCHLORIDE 100 MG: 20 INJECTION INTRAVENOUS at 03:09

## 2023-09-14 RX ADMIN — PROPOFOL 200 MCG/KG/MIN: 10 INJECTION, EMULSION INTRAVENOUS at 03:09

## 2023-09-14 RX ADMIN — PROPOFOL 100 MG: 10 INJECTION, EMULSION INTRAVENOUS at 03:09

## 2023-09-14 NOTE — H&P
Short Stay Endoscopy History and Physical    PCP - Eusebio Escamilla MD  Referring Physician - Kelvin Durán MD  [address] [place] [address]    Procedure - EGD  ASA - per anesthesia  Mallampati - per anesthesia  History of Anesthesia problems - per anesthesia  Family history Anesthesia problems -  per anesthesia   Plan of anesthesia - per anesthesia    HPI:  This is a 28 y.o. female here for evaluation of: EGD for chronic upper GI complaints; suspected gastroparesis; globus sensation; hoarse voice; dysphagia; dyspepsia      ROS:  Constitutional: No fevers, chills, No weight loss  CV: No chest pain  Pulm: No cough, No shortness of breath  Ophtho: No vision changes  GI: see HPI  Derm: No rash    Medical History:  has a past medical history of Allergic sinusitis, Anxiety, Digestive disorder, Dyspareunia due to medical condition in female, Endometriosis, GERD (gastroesophageal reflux disease), Pelvic pain in female, Postpartum depression, Syncope, and UTI (urinary tract infection).    Surgical History:  has a past surgical history that includes Pelvic laparoscopy; IUD placement and removal; Esophagogastroduodenoscopy (N/A, 1/30/2020); Colonoscopy (N/A, 1/30/2020); Postpartum ligation of fallopian tube (Bilateral, 9/30/2020); epidural for childbirth; Mobile tooth extraction; Laparoscopic total hysterectomy (N/A, 7/27/2021); Laparoscopic salpingectomy (Bilateral, 7/27/2021); Hysterectomy; Laparoscopic cholecystectomy (N/A, 12/14/2021); xi robotic jejunostomy (N/A, 3/24/2023); insertion, peg tube (3/24/2023); xi robotic pyloromyotomy (N/A, 5/19/2023); and Esophagogastroduodenoscopy (N/A, 6/2/2023).    Family History: family history includes Birth defects in her sister; Diabetes in her maternal grandmother and paternal grandfather; No Known Problems in her mother; Stomach cancer in her paternal grandfather..    Social History:  reports that she quit smoking about 6 years ago. Her smoking use  included cigarettes. She has never used smokeless tobacco. She reports that she does not currently use alcohol. She reports current drug use. Drug: Marijuana.    Review of patient's allergies indicates:   Allergen Reactions    Wellbutrin [bupropion hcl] Other (See Comments)     Suicidal thoughts       Medications:   Medications Prior to Admission   Medication Sig Dispense Refill Last Dose    FLUoxetine 40 MG capsule Take 40 mg by mouth every morning.   9/13/2023    gabapentin (NEURONTIN) 600 MG tablet Take 1 tablet (600 mg total) by mouth 3 (three) times daily. 90 tablet 11 9/13/2023    ondansetron (ZOFRAN) 4 MG tablet Take 4 mg by mouth 2 (two) times daily.   9/13/2023    pantoprazole (PROTONIX) 40 MG tablet Take 40 mg by mouth once daily.   9/13/2023    topiramate (TOPAMAX) 50 MG tablet Take 50 mg by mouth 2 (two) times daily.   9/14/2023    acetaminophen (TYLENOL) 160 mg/5 mL Liqd Take 31.3 mLs (1,001.6 mg total) by mouth every 8 (eight) hours.  0     acetaminophen (TYLENOL) 500 MG tablet Take 1 tablet (500 mg total) by mouth every 6 (six) hours as needed for Pain.  0     cetirizine (ZYRTEC) 10 MG tablet Take 1 tablet (10 mg total) by mouth once daily. (Patient taking differently: Take 10 mg by mouth daily as needed.) 30 tablet 0 More than a month    fluticasone propionate (FLONASE) 50 mcg/actuation nasal spray 1 spray (50 mcg total) by Each Nostril route 2 (two) times daily as needed. 15 g 0     metoclopramide HCl (REGLAN) 10 MG tablet Take 1 tablet (10 mg total) by mouth every 6 (six) hours as needed. 12 tablet 0 More than a month       Physical Exam:    Vital Signs:   Vitals:    09/14/23 1434   BP: 111/74   Pulse: 70   Resp: 16   Temp: 98.4 °F (36.9 °C)       General Appearance: Well appearing in no acute distress    Labs:  Lab Results   Component Value Date    WBC 12.89 (H) 05/20/2023    HGB 11.9 (L) 05/20/2023    HCT 37.3 05/20/2023     05/20/2023    ALT 6 (L) 04/05/2023    AST 13 04/05/2023      05/20/2023    K 4.5 05/20/2023     05/20/2023    CREATININE 0.7 05/20/2023    BUN 12 05/20/2023    CO2 22 (L) 05/20/2023    TSH 0.918 04/08/2022    INR 1.0 05/16/2020       I have explained the risks and benefits of this endoscopic procedure to the patient including but not limited to bleeding, inflammation, infection, perforation, missing a lesion and death.      Clem Wise MD

## 2023-09-14 NOTE — PLAN OF CARE
To procedure room 10 via stretcher. All vital signs, medications, IV fluids and sedation per CRNA. See anesthesia notes. Pt's belongings under stretcher

## 2023-09-14 NOTE — ANESTHESIA POSTPROCEDURE EVALUATION
Anesthesia Post Evaluation    Patient: Tess Rodríguez    Procedure(s) Performed: Procedure(s) (LRB):  EGD (ESOPHAGOGASTRODUODENOSCOPY) (N/A)    Final Anesthesia Type: general      Patient location during evaluation: PACU  Patient participation: Yes- Able to Participate  Level of consciousness: awake and alert and oriented  Post-procedure vital signs: reviewed and stable  Pain management: adequate  Airway patency: patent    PONV status at discharge: No PONV  Anesthetic complications: no      Cardiovascular status: hemodynamically stable  Respiratory status: unassisted  Hydration status: euvolemic  Follow-up not needed.          Vitals Value Taken Time   /82 09/14/23 1637   Temp 36.7 °C (98.1 °F) 09/14/23 1619   Pulse 68 09/14/23 1637   Resp 18 09/14/23 1637   SpO2 99 % 09/14/23 1637         Event Time   Out of Recovery 16:42:04         Pain/Mary Ann Score: Mary Ann Score: 10 (9/14/2023  4:20 PM)

## 2023-09-14 NOTE — TRANSFER OF CARE
"Anesthesia Transfer of Care Note    Patient: Tess Rodríguez    Procedure(s) Performed: Procedure(s) (LRB):  EGD (ESOPHAGOGASTRODUODENOSCOPY) (N/A)    Patient location: PACU    Anesthesia Type: general    Transport from OR: Transported from OR on room air with adequate spontaneous ventilation    Post pain: adequate analgesia    Post assessment: no apparent anesthetic complications    Post vital signs: stable    Level of consciousness: awake    Nausea/Vomiting: no nausea/vomiting    Complications: none    Transfer of care protocol was followed      Last vitals:   Visit Vitals  /68 (BP Location: Left arm, Patient Position: Lying)   Pulse 70   Temp 36.9 °C (98.4 °F) (Temporal)   Resp 16   Ht 5' 5" (1.651 m)   Wt 63.5 kg (140 lb)   LMP 10/06/2021   SpO2 98%   Breastfeeding No   BMI 23.30 kg/m²     "

## 2023-09-14 NOTE — PROVATION PATIENT INSTRUCTIONS
Discharge Summary/Instructions after an Endoscopic Procedure  Patient Name: Tess Rodríguez  Patient MRN: [id number]  Patient YOB: 1995 Thursday, September 14, 2023  Clem Wise MD  Dear patient,  As a result of recent federal legislation (The Federal Cures Act), you may   receive lab or pathology results from your procedure in your [facility]   account before your physician is able to contact you. Your physician or   their representative will relay the results to you with their   recommendations at their soonest availability.  Thank you,  RESTRICTIONS:  During your procedure today, you received medications for sedation.  These   medications may affect your judgment, balance and coordination.  Therefore,   for 24 hours, you have the following restrictions:   - DO NOT drive a car, operate machinery, make legal/financial decisions,   sign important papers or drink alcohol.    ACTIVITY:  Today: no heavy lifting, straining or running due to procedural   sedation/anesthesia.  The following day: return to full activity including work.  DIET:  Eat and drink normally unless instructed otherwise.     TREATMENT FOR COMMON SIDE EFFECTS:  - Mild abdominal pain, nausea, belching, bloating or excessive gas:  rest,   eat lightly and use a heating pad.  - Sore Throat: treat with throat lozenges and/or gargle with warm salt   water.  - Because air was used during the procedure, expelling large amounts of air   from your rectum or belching is normal.  - If a bowel prep was taken, you may not have a bowel movement for 1-3 days.    This is normal.  SYMPTOMS TO WATCH FOR AND REPORT TO YOUR PHYSICIAN:  1. Abdominal pain or bloating, other than gas cramps.  2. Chest pain.  3. Back pain.  4. Signs of infection such as: chills or fever occurring within 24 hours   after the procedure.  5. Rectal bleeding, which would show as bright red, maroon, or black stools.   (A tablespoon of blood from the rectum is not serious, especially  if   hemorrhoids are present.)  6. Vomiting.  7. Weakness or dizziness.  GO DIRECTLY TO THE NEAREST EMERGENCY ROOM IF YOU HAVE ANY OF THE FOLLOWING:      Difficulty breathing              Chills and/or fever over 101 F   Persistent vomiting and/or vomiting blood   Severe abdominal pain   Severe chest pain   Black, tarry stools   Bleeding- more than one tablespoon   Any other symptom or condition that you feel may need urgent attention  Your doctor recommends these additional instructions:  If any biopsies were taken, your doctors clinic will contact you in 1 to 2   weeks with any results.  - Patient has a contact number available for emergencies.  The signs and   symptoms of potential delayed complications were discussed with the   patient.  Return to normal activities tomorrow.  Written discharge   instructions were provided to the patient.   - Discharge patient to home (ambulatory).   - Resume previous diet.   - Continue present medications.   - Await pathology results.   - Refer to a GI motility specialist/general gastroenterologist at the next   available appointment.  For questions, problems or results please call your physician - Clem Wise MD at Work:  [phone].  [facility], EMERGENCY ROOM PHONE NUMBER: [phone]  IF A COMPLICATION OR EMERGENCY SITUATION ARISES AND YOU ARE UNABLE TO REACH   YOUR PHYSICIAN - GO DIRECTLY TO THE EMERGENCY ROOM.  Clem Wise MD  9/14/2023 4:07:54 PM  This report has been verified and signed electronically.  Dear patient,  As a result of recent federal legislation (The Federal Cures Act), you may   receive lab or pathology results from your procedure in your [facility]   account before your physician is able to contact you. Your physician or   their representative will relay the results to you with their   recommendations at their soonest availability.  Thank you,  PROVATION

## 2023-09-14 NOTE — ANESTHESIA PREPROCEDURE EVALUATION
09/14/2023  Hillivana Rodríguez is a 28 y.o., female.  Pre-operative evaluation for EGD (ESOPHAGOGASTRODUODENOSCOPY) (N/A)    Chief Complaint:gastroparesis, dysphagia, hoarseness    PMH:  Endometriosis s/p hysterectomy  Pelvic pain  GERD   Past Surgical History:   Procedure Laterality Date    COLONOSCOPY N/A 1/30/2020    Procedure: COLONOSCOPY;  Surgeon: Jonathon Chisholm MD;  Location: [facility];  Service: Endoscopy;  Laterality: N/A;    epidural for childbirth      ESOPHAGOGASTRODUODENOSCOPY N/A 1/30/2020    Procedure: ESOPHAGOGASTRODUODENOSCOPY (EGD);  Surgeon: Jonathon Chisholm MD;  Location: [facility];  Service: Endoscopy;  Laterality: N/A;    ESOPHAGOGASTRODUODENOSCOPY N/A 6/2/2023    Procedure: EGD (ESOPHAGOGASTRODUODENOSCOPY);  Surgeon: Estephania Jaimes MD;  Location: [facility];  Service: Endoscopy;  Laterality: N/A;    HYSTERECTOMY      INSERTION, PEG TUBE  3/24/2023    Procedure: INSERTION, PEG TUBE;  Surgeon: Kelvin Durán MD;  Location: [address];  Service: General;;    IUD placement and removal      LAPAROSCOPIC CHOLECYSTECTOMY N/A 12/14/2021    Procedure: CHOLECYSTECTOMY, LAPAROSCOPIC;  Surgeon: Phillip Nguyen MD;  Location: [facility];  Service: General;  Laterality: N/A;    LAPAROSCOPIC SALPINGECTOMY Bilateral 7/27/2021    Procedure: SALPINGECTOMY, LAPAROSCOPIC, bilateral;  Surgeon: Lev Giles Jr., MD;  Location: [facility] OR;  Service: OB/GYN;  Laterality: Bilateral;    LAPAROSCOPIC TOTAL HYSTERECTOMY N/A 7/27/2021    Procedure: HYSTERECTOMY, TOTAL, LAPAROSCOPIC;  Surgeon: Lev Giles Jr., MD;  Location: [facility] OR;  Service: OB/GYN;  Laterality: N/A;  11:30 per Omari, negative 07/20    PELVIC LAPAROSCOPY      POSTPARTUM LIGATION OF FALLOPIAN TUBE Bilateral 9/30/2020    Procedure: LIGATION, FALLOPIAN TUBE, POSTPARTUM;  Surgeon: Lev JETT  "Chan Helms MD;  Location: [facility];  Service: OB/GYN;  Laterality: Bilateral;    WISDOM TOOTH EXTRACTION      XI ROBOTIC JEJUNOSTOMY N/A 3/24/2023    Procedure: XI ROBOTIC JEJUNOSTOMY;  Surgeon: Kelvin Durán MD;  Location: [address];  Service: General;  Laterality: N/A;  NO NARCOTICS    XI ROBOTIC PYLOROMYOTOMY N/A 2023    Procedure: XI ROBOTIC PYLOROMYOTOMY;  Surgeon: Kelvin Durán MD;  Location: [address];  Service: General;  Laterality: N/A;         Vital Signs Range (Last 24H):  Temp:  [36.9 °C (98.4 °F)]   Pulse:  [70]   Resp:  [16]   BP: (111)/(74)   SpO2:  [98 %]       CBC:   No results for input(s): "WBC", "RBC", "HGB", "HCT", "PLT", "MCV", "MCH", "MCHC" in the last 720 hours.    CMP: No results for input(s): "NA", "K", "CL", "CO2", "BUN", "CREATININE", "GLU", "MG", "PHOS", "CALCIUM", "ALBUMIN", "PROT", "ALKPHOS", "ALT", "AST", "BILITOT" in the last 720 hours.    INR:  No results for input(s): "PT", "INR", "PROTIME", "APTT" in the last 720 hours.      Diagnostic Studies:      EKD Echo:    Pre-op Assessment    I have reviewed the Patient Summary Reports.     I have reviewed the Nursing Notes. I have reviewed the NPO Status.   I have reviewed the Medications.     Review of Systems  Anesthesia Hx:  No problems with previous Anesthesia    Social:  No Alcohol Use Smokes medical marijuana   Cardiovascular:  Cardiovascular Normal     Pulmonary:  Pulmonary Normal    Neurological:  Neurology Normal        Physical Exam  General: Well nourished, Cooperative, Alert and Oriented    Airway:  Mallampati: I   Mouth Opening: Normal  TM Distance: Normal  Tongue: Normal  Neck ROM: Normal ROM    Dental:  Intact    Chest/Lungs:  Normal Respiratory Rate        Anesthesia Plan  Type of Anesthesia, risks & benefits discussed:    Anesthesia Type: Gen Natural Airway  Intra-op Monitoring Plan: Standard ASA Monitors  Post Op Pain Control Plan: multimodal analgesia  Induction:  " IV  Informed Consent: Informed consent signed with the Patient and all parties understand the risks and agree with anesthesia plan.  All questions answered.   ASA Score: 2  Day of Surgery Review of History & Physical: H&P Update referred to the surgeon/provider.    Ready For Surgery From Anesthesia Perspective.     .

## 2023-09-21 LAB
FINAL PATHOLOGIC DIAGNOSIS: NORMAL
Lab: NORMAL

## 2023-10-02 ENCOUNTER — HOSPITAL ENCOUNTER (OUTPATIENT)
Facility: HOSPITAL | Age: 28
Discharge: HOME | End: 2023-10-02
Attending: INTERNAL MEDICINE | Admitting: INTERNAL MEDICINE
Payer: MEDICAID

## 2023-10-02 VITALS
RESPIRATION RATE: 17 BRPM | OXYGEN SATURATION: 100 % | BODY MASS INDEX: 23.49 KG/M2 | HEIGHT: 65 IN | SYSTOLIC BLOOD PRESSURE: 113 MMHG | WEIGHT: 141 LBS | TEMPERATURE: 98 F | HEART RATE: 67 BPM | DIASTOLIC BLOOD PRESSURE: 56 MMHG

## 2023-10-02 DIAGNOSIS — R13.10 DYSPHAGIA: ICD-10-CM

## 2023-10-02 PROCEDURE — 25000003 PHARM REV CODE 250: Performed by: INTERNAL MEDICINE

## 2023-10-02 PROCEDURE — 91010 ESOPHAGUS MOTILITY STUDY: CPT | Mod: 26,,, | Performed by: INTERNAL MEDICINE

## 2023-10-02 PROCEDURE — 91010 PR ESOPHAGEAL MOTILITY STUDY, MA2METRY: ICD-10-PCS | Mod: 26,,, | Performed by: INTERNAL MEDICINE

## 2023-10-02 PROCEDURE — 91010 ESOPHAGUS MOTILITY STUDY: CPT | Mod: TC | Performed by: INTERNAL MEDICINE

## 2023-10-02 RX ORDER — LIDOCAINE HYDROCHLORIDE 20 MG/ML
JELLY TOPICAL ONCE
Status: COMPLETED | OUTPATIENT
Start: 2023-10-02 | End: 2023-10-02

## 2023-10-02 RX ADMIN — LIDOCAINE HYDROCHLORIDE 10 ML: 20 JELLY TOPICAL at 10:10

## 2023-10-03 NOTE — PROVATION PATIENT INSTRUCTIONS
Discharge Summary/Instructions after an Endoscopic Procedure  Patient Name: Tess Rodríguez  Patient MRN: [id number]  Patient YOB: 1995  Tuesday, October 3, 2023  Ya Moreland MD  Dear patient,  As a result of recent federal legislation (The Federal Cures Act), you may   receive lab or pathology results from your procedure in your [facility]   account before your physician is able to contact you. Your physician or   their representative will relay the results to you with their   recommendations at their soonest availability.  Thank you,  RESTRICTIONS:  During your procedure today, you received medications for sedation.  These   medications may affect your judgment, balance and coordination.  Therefore,   for 24 hours, you have the following restrictions:   - DO NOT drive a car, operate machinery, make legal/financial decisions,   sign important papers or drink alcohol.    ACTIVITY:  Today: no heavy lifting, straining or running due to procedural   sedation/anesthesia.  The following day: return to full activity including work.  DIET:  Eat and drink normally unless instructed otherwise.     TREATMENT FOR COMMON SIDE EFFECTS:  - Mild abdominal pain, nausea, belching, bloating or excessive gas:  rest,   eat lightly and use a heating pad.  - Sore Throat: treat with throat lozenges and/or gargle with warm salt   water.  - Because air was used during the procedure, expelling large amounts of air   from your rectum or belching is normal.  - If a bowel prep was taken, you may not have a bowel movement for 1-3 days.    This is normal.  SYMPTOMS TO WATCH FOR AND REPORT TO YOUR PHYSICIAN:  1. Abdominal pain or bloating, other than gas cramps.  2. Chest pain.  3. Back pain.  4. Signs of infection such as: chills or fever occurring within 24 hours   after the procedure.  5. Rectal bleeding, which would show as bright red, maroon, or black stools.   (A tablespoon of blood from the rectum is not serious, especially  if   hemorrhoids are present.)  6. Vomiting.  7. Weakness or dizziness.  GO DIRECTLY TO THE NEAREST EMERGENCY ROOM IF YOU HAVE ANY OF THE FOLLOWING:      Difficulty breathing              Chills and/or fever over 101 F   Persistent vomiting and/or vomiting blood   Severe abdominal pain   Severe chest pain   Black, tarry stools   Bleeding- more than one tablespoon   Any other symptom or condition that you feel may need urgent attention  Your doctor recommends these additional instructions:  If any biopsies were taken, your doctors clinic will contact you in 1 to 2   weeks with any results.  Follow up with your referring provider.  For questions, problems or results please call your physician - Ya Moreland MD at Work:  [phone].  [facility], EMERGENCY ROOM PHONE NUMBER: [phone]  IF A COMPLICATION OR EMERGENCY SITUATION ARISES AND YOU ARE UNABLE TO REACH   YOUR PHYSICIAN - GO DIRECTLY TO THE EMERGENCY ROOM.  Ya Moreland MD  10/3/2023 5:26:05 PM  This report has been verified and signed electronically.  Dear patient,  As a result of recent federal legislation (The Federal Cures Act), you may   receive lab or pathology results from your procedure in your [facility]   account before your physician is able to contact you. Your physician or   their representative will relay the results to you with their   recommendations at their soonest availability.  Thank you,  PROVATION

## 2023-10-09 ENCOUNTER — TELEPHONE (OUTPATIENT)
Dept: SURGERY | Facility: CLINIC | Age: 28
End: 2023-10-09
Payer: MEDICAID

## 2023-10-10 ENCOUNTER — OFFICE VISIT (OUTPATIENT)
Dept: SURGERY | Facility: CLINIC | Age: 28
End: 2023-10-10
Payer: MEDICAID

## 2023-10-10 ENCOUNTER — LAB VISIT (OUTPATIENT)
Dept: LAB | Facility: HOSPITAL | Age: 28
End: 2023-10-10
Attending: SURGERY
Payer: MEDICAID

## 2023-10-10 VITALS
HEART RATE: 97 BPM | SYSTOLIC BLOOD PRESSURE: 131 MMHG | DIASTOLIC BLOOD PRESSURE: 84 MMHG | HEIGHT: 65 IN | BODY MASS INDEX: 22.92 KG/M2 | WEIGHT: 137.56 LBS

## 2023-10-10 DIAGNOSIS — K31.84 GASTROPARESIS: Primary | ICD-10-CM

## 2023-10-10 DIAGNOSIS — K31.84 GASTROPARESIS: ICD-10-CM

## 2023-10-10 DIAGNOSIS — R10.13 EPIGASTRIC PAIN: ICD-10-CM

## 2023-10-10 LAB
ALBUMIN SERPL BCP-MCNC: 4.6 G/DL (ref 3.5–5.2)
ALP SERPL-CCNC: 47 U/L (ref 55–135)
ALT SERPL W/O P-5'-P-CCNC: 7 U/L (ref 10–44)
ANION GAP SERPL CALC-SCNC: 7 MMOL/L (ref 8–16)
AST SERPL-CCNC: 11 U/L (ref 10–40)
BASOPHILS # BLD AUTO: 0.01 K/UL (ref 0–0.2)
BASOPHILS NFR BLD: 0.2 % (ref 0–1.9)
BILIRUB SERPL-MCNC: 0.5 MG/DL (ref 0.1–1)
BUN SERPL-MCNC: 9 MG/DL (ref 6–20)
CALCIUM SERPL-MCNC: 9.4 MG/DL (ref 8.7–10.5)
CHLORIDE SERPL-SCNC: 109 MMOL/L (ref 95–110)
CO2 SERPL-SCNC: 25 MMOL/L (ref 23–29)
CREAT SERPL-MCNC: 0.9 MG/DL (ref 0.5–1.4)
DIFFERENTIAL METHOD: NORMAL
EOSINOPHIL # BLD AUTO: 0.1 K/UL (ref 0–0.5)
EOSINOPHIL NFR BLD: 0.9 % (ref 0–8)
ERYTHROCYTE [DISTWIDTH] IN BLOOD BY AUTOMATED COUNT: 12.6 % (ref 11.5–14.5)
EST. GFR  (NO RACE VARIABLE): >60 ML/MIN/1.73 M^2
GLUCOSE SERPL-MCNC: 81 MG/DL (ref 70–110)
HCT VFR BLD AUTO: 45.8 % (ref 37–48.5)
HGB BLD-MCNC: 15.4 G/DL (ref 12–16)
IMM GRANULOCYTES # BLD AUTO: 0.01 K/UL (ref 0–0.04)
IMM GRANULOCYTES NFR BLD AUTO: 0.2 % (ref 0–0.5)
LIPASE SERPL-CCNC: 20 U/L (ref 4–60)
LYMPHOCYTES # BLD AUTO: 1.5 K/UL (ref 1–4.8)
LYMPHOCYTES NFR BLD: 27 % (ref 18–48)
MCH RBC QN AUTO: 30.9 PG (ref 27–31)
MCHC RBC AUTO-ENTMCNC: 33.6 G/DL (ref 32–36)
MCV RBC AUTO: 92 FL (ref 82–98)
MONOCYTES # BLD AUTO: 0.4 K/UL (ref 0.3–1)
MONOCYTES NFR BLD: 6.4 % (ref 4–15)
NEUTROPHILS # BLD AUTO: 3.6 K/UL (ref 1.8–7.7)
NEUTROPHILS NFR BLD: 65.3 % (ref 38–73)
NRBC BLD-RTO: 0 /100 WBC
PLATELET # BLD AUTO: 230 K/UL (ref 150–450)
PMV BLD AUTO: 11.4 FL (ref 9.2–12.9)
POTASSIUM SERPL-SCNC: 3.8 MMOL/L (ref 3.5–5.1)
PROT SERPL-MCNC: 7.9 G/DL (ref 6–8.4)
RBC # BLD AUTO: 4.99 M/UL (ref 4–5.4)
SODIUM SERPL-SCNC: 141 MMOL/L (ref 136–145)
WBC # BLD AUTO: 5.48 K/UL (ref 3.9–12.7)

## 2023-10-10 PROCEDURE — 85025 COMPLETE CBC W/AUTO DIFF WBC: CPT | Performed by: SURGERY

## 2023-10-10 PROCEDURE — 83690 ASSAY OF LIPASE: CPT | Performed by: SURGERY

## 2023-10-10 PROCEDURE — 3079F DIAST BP 80-89 MM HG: CPT | Mod: CPTII,,, | Performed by: SURGERY

## 2023-10-10 PROCEDURE — 3075F PR MOST RECENT SYSTOLIC BLOOD PRESS GE 130-139MM HG: ICD-10-PCS | Mod: CPTII,,, | Performed by: SURGERY

## 2023-10-10 PROCEDURE — 1160F RVW MEDS BY RX/DR IN RCRD: CPT | Mod: CPTII,,, | Performed by: SURGERY

## 2023-10-10 PROCEDURE — 36415 COLL VENOUS BLD VENIPUNCTURE: CPT | Performed by: SURGERY

## 2023-10-10 PROCEDURE — 3008F PR BODY MASS INDEX (BMI) DOCUMENTED: ICD-10-PCS | Mod: CPTII,,, | Performed by: SURGERY

## 2023-10-10 PROCEDURE — 99213 OFFICE O/P EST LOW 20 MIN: CPT | Mod: PBBFAC | Performed by: SURGERY

## 2023-10-10 PROCEDURE — 1160F PR REVIEW ALL MEDS BY PRESCRIBER/CLIN PHARMACIST DOCUMENTED: ICD-10-PCS | Mod: CPTII,,, | Performed by: SURGERY

## 2023-10-10 PROCEDURE — 1159F PR MEDICATION LIST DOCUMENTED IN MEDICAL RECORD: ICD-10-PCS | Mod: CPTII,,, | Performed by: SURGERY

## 2023-10-10 PROCEDURE — 3079F PR MOST RECENT DIASTOLIC BLOOD PRESSURE 80-89 MM HG: ICD-10-PCS | Mod: CPTII,,, | Performed by: SURGERY

## 2023-10-10 PROCEDURE — 99214 PR OFFICE/OUTPT VISIT, EST, LEVL IV, 30-39 MIN: ICD-10-PCS | Mod: S$PBB,,, | Performed by: SURGERY

## 2023-10-10 PROCEDURE — 80053 COMPREHEN METABOLIC PANEL: CPT | Performed by: SURGERY

## 2023-10-10 PROCEDURE — 3008F BODY MASS INDEX DOCD: CPT | Mod: CPTII,,, | Performed by: SURGERY

## 2023-10-10 PROCEDURE — 1159F MED LIST DOCD IN RCRD: CPT | Mod: CPTII,,, | Performed by: SURGERY

## 2023-10-10 PROCEDURE — 99214 OFFICE O/P EST MOD 30 MIN: CPT | Mod: S$PBB,,, | Performed by: SURGERY

## 2023-10-10 PROCEDURE — 3075F SYST BP GE 130 - 139MM HG: CPT | Mod: CPTII,,, | Performed by: SURGERY

## 2023-10-10 PROCEDURE — 99999 PR PBB SHADOW E&M-EST. PATIENT-LVL III: ICD-10-PCS | Mod: PBBFAC,,, | Performed by: SURGERY

## 2023-10-10 PROCEDURE — 99999 PR PBB SHADOW E&M-EST. PATIENT-LVL III: CPT | Mod: PBBFAC,,, | Performed by: SURGERY

## 2023-10-10 RX ORDER — SCOLOPAMINE TRANSDERMAL SYSTEM 1 MG/1
1 PATCH, EXTENDED RELEASE TRANSDERMAL
Qty: 10 PATCH | Refills: 6 | Status: SHIPPED | OUTPATIENT
Start: 2023-10-10

## 2023-10-10 NOTE — PROGRESS NOTES
I have seen the patient, reviewed the Student's history and physical, assessment and plan. I have personally interviewed and examined the patient at bedside and: agree with the findings.     27y/o with gerd and gastroparesis s/p peg with j tube 3/24/23 and s/p robotic pyloromyotomy 5/19/23.       Interval history 7/6/23: Her symptoms are slightly worse than last visit but she is eating more and gaining weight.  She has severe to extremely severe epigastric pain, moderate to extremely severe nausea and mild vomiting.  She has not been to the ER when the j tube fell out and it was in place.  She wants her j tube removed.     Interval history 8/22/23: She has severe to extremely severe epigastric pain, severe nausea and mild vomiting.  Severe but improved from preop.  She appears to have significant gerd despite medication.    Interval history 10/10/23: She was doing better but recently has been doing worse.  She has severe to extremely severe epigastric pain, and nausea and severe vomiting.  She is taking reglan 10 bid (only when trying to eat), zofran bid, otc med didn't help and ppi bid.  She has lost 18lb since her last visit here.  She has also been lightheaded and has seen neurology and ruled out for seizures.      shows one narcotics rx in 2/2022 but none since.       She is not able to work due to symptoms.      shows no recent narcotic rx.     Preop studies  Ugi with small bowel 2022 reviewed, films viewed    -Delayed gastric emptying consistent with the clinical diagnosis of gastro paresis.    -Normal appearing small bowel.    -Questionable area in the cervical esophagus.  Would consider endoscopy.  Ct 2022 reviewed, films viewed    -No abnormality.    -Stomach appears ok  Ct 2023 (for peg tube site pain), reviewed, films viewed    1. Peg tube in the stomach, no abscess and appears appropriately placed to me    2. Previous cholecystectomy    3. There is a catheter in the left abdomen question if this is  a drainage catheter    4. No bowel obstruction  Ges 2022 reviewed    -At 241 minutes, gastric emptying was noted to be 61%, c/w gastroparesis  Egd 2020 reviewed    - Normal esophagus.     - Gastritis. Biopsied.     - Normal examined duodenum.     - Biopsies were taken with a cold forceps for         histology in the second portion of the duodenum.   Ekg 2023 reviewed    -Normal     Postop studies  Ges 8/2023 reviewed, normal  Egd  6/2023 reviewed, biliuos fluid in stomach, other findings in report  Motility 2023 reviewed, normal     S/p pyloric procedure for gastroparesis with significant improvement in symptoms but still with significant symptoms.  She must have other factors causing her symptoms including possible gerd, sibo, etc.  Follow up GI and may benefit seeing one of our motility GI group.  Consider ph probe (off meds).  Follow up after seeing our motility group (December 6) and ph probe if they agree with getting it.  Increase reglan to qid, add scopolamine patch (if not covered consider compazine), obtain cbc, cmp, lipase and ct with contrast.  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

## 2023-10-10 NOTE — MEDICAL/APP STUDENT
Subjective     Patient ID: Tess Rodríguez is a 28 y.o. female.    Chief Complaint: No chief complaint on file.    Tess Rodríguez is a 28 y.o female w/ PMHx of Gastroparesis s/p peg with j tube (3/24/23) and robotic pyloromyotomy (5/19/23). Patient states that symptoms had improved following procedure, but 3 weeks ago she began to experience increased vomiting, nausea and epigastric pain and continued early satiety. She has had significant weight loss (18lbs) since her last visit and has experienced episodes of dizziness and fainting. She has seen neurologist that ruled out seizurer disorder. Esophageal manometry normal, upper GI scope no endoscopic esophageal abnormality, biopsy negative for eosinophilic esophagitis and negative or H. pylori. She is taking Reglan BID, Zofran BID, PPI BID, and over the counter medication BID           Gastroparesis scoring  Vomting:  Severity (3)/Frequency(3)  Nausea: Severity (3)/Frequency(4)  Early satiety: Severity (3)/Frequency(4)  Bloating: Severity (4)/Frequency(3)  Postprandial fullness: Severity (3)/Frequency(4)  Epigastric pain: Severity (3)/Frequency(4)  Epigastric burning: Severity (3)/Frequency(3)     Review of Systems   Constitutional:  Positive for appetite change.   Respiratory: Negative.     Gastrointestinal:  Positive for nausea and reflux.   Endocrine: Negative.    Genitourinary: Negative.    Musculoskeletal: Negative.    Neurological:  Positive for dizziness, light-headedness and headaches. Negative for seizures and syncope.   Hematological: Negative.    Psychiatric/Behavioral: Negative.            Objective     Physical Exam  Constitutional:       Appearance: Normal appearance. She is normal weight.   HENT:      Head: Normocephalic and atraumatic.      Nose: Nose normal.      Mouth/Throat:      Mouth: Mucous membranes are moist.      Pharynx: Oropharynx is clear.   Eyes:      Conjunctiva/sclera: Conjunctivae normal.   Cardiovascular:      Rate and Rhythm:  Normal rate and regular rhythm.      Pulses: Normal pulses.      Heart sounds: Normal heart sounds.   Pulmonary:      Effort: Pulmonary effort is normal.      Breath sounds: Normal breath sounds.   Abdominal:      General: Bowel sounds are normal. There is no distension.      Palpations: There is no mass.      Tenderness: There is abdominal tenderness. There is guarding. There is no right CVA tenderness, left CVA tenderness or rebound.      Hernia: No hernia is present.   Musculoskeletal:         General: Normal range of motion.      Cervical back: Normal range of motion.   Skin:     General: Skin is warm and dry.   Neurological:      General: No focal deficit present.      Mental Status: She is alert and oriented to person, place, and time. Mental status is at baseline.   Psychiatric:         Behavior: Behavior normal.         Thought Content: Thought content normal.         Judgment: Judgment normal.            Assessment and Plan     1. Gastroparesis  -     CBC Auto Differential; Future; Expected date: 10/17/2023  -     COMPREHENSIVE METABOLIC PANEL; Future; Expected date: 10/17/2023  -     LIPASE; Future; Expected date: 10/17/2023    2. Epigastric pain  -     CT Abdomen Pelvis With Contrast; Future; Expected date: 10/10/2023    Other orders  -     scopolamine (TRANSDERM-SCOP) 1.3-1.5 mg (1 mg over 3 days); Place 1 patch onto the skin every 72 hours.  Dispense: 10 patch; Refill: 6        Cecy Miller, MS3   [employer], [facility] [employer]

## 2023-10-19 ENCOUNTER — PATIENT MESSAGE (OUTPATIENT)
Dept: SURGERY | Facility: CLINIC | Age: 28
End: 2023-10-19
Payer: MEDICAID

## 2023-10-19 ENCOUNTER — TELEPHONE (OUTPATIENT)
Dept: GASTROENTEROLOGY | Facility: CLINIC | Age: 28
End: 2023-10-19
Payer: MEDICAID

## 2023-10-19 NOTE — TELEPHONE ENCOUNTER
----- Message from Isabela Frost RN sent at 10/19/2023  8:57 AM CDT -----  Yes, she will take that.    Thank you!  ----- Message -----  From: Conchis Richardson MA  Sent: 10/18/2023   4:53 PM CDT  To: Isabela Frost RN    Hi,     I attempted to contact patient to schedule for a sooner appointment, but she is not accepting any calls. I have an appointment available on 11/17 at 1:30.  ----- Message -----  From: Isabela Frost RN  Sent: 10/10/2023   9:04 AM CDT  To: Aniceto Pandya Staff    Good morning-    Dr. Durán is hoping to get this pt scheduled with Dr. Moreland sooner than later.   Looks like she is scheduled in Dec., but is hoping for something sooner.  Can y'all please help out?    Thanks,  Isabela

## 2023-10-20 DIAGNOSIS — K31.84 GASTROPARESIS: Primary | ICD-10-CM

## 2023-11-17 ENCOUNTER — OFFICE VISIT (OUTPATIENT)
Dept: GASTROENTEROLOGY | Facility: CLINIC | Age: 28
End: 2023-11-17
Payer: MEDICAID

## 2023-11-17 VITALS
WEIGHT: 145.5 LBS | HEART RATE: 70 BPM | HEIGHT: 65 IN | DIASTOLIC BLOOD PRESSURE: 71 MMHG | BODY MASS INDEX: 24.24 KG/M2 | SYSTOLIC BLOOD PRESSURE: 106 MMHG

## 2023-11-17 DIAGNOSIS — K31.84 GASTROPARESIS: ICD-10-CM

## 2023-11-17 DIAGNOSIS — K59.04 CHRONIC IDIOPATHIC CONSTIPATION: ICD-10-CM

## 2023-11-17 DIAGNOSIS — R11.2 NAUSEA AND VOMITING, UNSPECIFIED VOMITING TYPE: Primary | ICD-10-CM

## 2023-11-17 PROCEDURE — 99204 OFFICE O/P NEW MOD 45 MIN: CPT | Mod: S$PBB,,, | Performed by: INTERNAL MEDICINE

## 2023-11-17 PROCEDURE — 3078F PR MOST RECENT DIASTOLIC BLOOD PRESSURE < 80 MM HG: ICD-10-PCS | Mod: CPTII,,, | Performed by: INTERNAL MEDICINE

## 2023-11-17 PROCEDURE — 3074F PR MOST RECENT SYSTOLIC BLOOD PRESSURE < 130 MM HG: ICD-10-PCS | Mod: CPTII,,, | Performed by: INTERNAL MEDICINE

## 2023-11-17 PROCEDURE — 3078F DIAST BP <80 MM HG: CPT | Mod: CPTII,,, | Performed by: INTERNAL MEDICINE

## 2023-11-17 PROCEDURE — 3008F PR BODY MASS INDEX (BMI) DOCUMENTED: ICD-10-PCS | Mod: CPTII,,, | Performed by: INTERNAL MEDICINE

## 2023-11-17 PROCEDURE — 99999 PR PBB SHADOW E&M-EST. PATIENT-LVL III: CPT | Mod: PBBFAC,,, | Performed by: INTERNAL MEDICINE

## 2023-11-17 PROCEDURE — 99204 PR OFFICE/OUTPT VISIT, NEW, LEVL IV, 45-59 MIN: ICD-10-PCS | Mod: S$PBB,,, | Performed by: INTERNAL MEDICINE

## 2023-11-17 PROCEDURE — 99999 PR PBB SHADOW E&M-EST. PATIENT-LVL III: ICD-10-PCS | Mod: PBBFAC,,, | Performed by: INTERNAL MEDICINE

## 2023-11-17 PROCEDURE — 3008F BODY MASS INDEX DOCD: CPT | Mod: CPTII,,, | Performed by: INTERNAL MEDICINE

## 2023-11-17 PROCEDURE — 1159F MED LIST DOCD IN RCRD: CPT | Mod: CPTII,,, | Performed by: INTERNAL MEDICINE

## 2023-11-17 PROCEDURE — 99213 OFFICE O/P EST LOW 20 MIN: CPT | Mod: PBBFAC | Performed by: INTERNAL MEDICINE

## 2023-11-17 PROCEDURE — 1159F PR MEDICATION LIST DOCUMENTED IN MEDICAL RECORD: ICD-10-PCS | Mod: CPTII,,, | Performed by: INTERNAL MEDICINE

## 2023-11-17 PROCEDURE — 3074F SYST BP LT 130 MM HG: CPT | Mod: CPTII,,, | Performed by: INTERNAL MEDICINE

## 2023-11-17 RX ORDER — MIRTAZAPINE 15 MG/1
15 TABLET, FILM COATED ORAL NIGHTLY
Qty: 30 TABLET | Refills: 11 | Status: SHIPPED | OUTPATIENT
Start: 2023-11-17 | End: 2024-11-16

## 2023-11-17 RX ORDER — PRUCALOPRIDE 2 MG/1
2 TABLET, FILM COATED ORAL DAILY
Qty: 30 TABLET | Refills: 11 | Status: SHIPPED | OUTPATIENT
Start: 2023-11-17

## 2023-11-17 NOTE — PROGRESS NOTES
"[facility]    Referral from Dr. Durán    CC: nausea    HPI 28 y.o. female with past medical history of gastroparesis s/p G J tube placement and removal and pyloroplasty with follow up normal GES who presents with chronic, daily, nausea with associated vomiting, bloating, decreased appetite and previous weight loss.  She also has constipation.    Zofran and phenergan no longer help.  Scopalamine patches do.  She also takes Reglan 4mg 4 times daily.    She has GERD and takes Protonix 40mg BID as well as tums.    She tried Linzess 72mcg for constipation but it gave her diarrhea.    She smokes medical marijuana for her appetite and symptoms.    Past Medical History  Past Medical History:   Diagnosis Date    Allergic sinusitis     Anxiety     Digestive disorder     ACID REFLUX    Dyspareunia due to medical condition in female     Endometriosis     GERD (gastroesophageal reflux disease)     Pelvic pain in female     Postpartum depression     Syncope     CHRONIC/RECURRING    UTI (urinary tract infection)     H/O         Physical Examination  /71   Pulse 70   Ht 5' 5" (1.651 m)   Wt 66 kg (145 lb 8.1 oz)   LMP 10/06/2021   BMI 24.21 kg/m²   General appearance: alert, cooperative, no distress  HENT: Normocephalic, atraumatic, neck symmetrical, no nasal discharge   Abdomen: soft, non-tender; non distended, no rebound or guarding  Neurologic: Alert and oriented X 3, moving all four extremities, intact sensation to light touch    Labs:  Lab Results   Component Value Date    WBC 5.48 10/10/2023    HGB 15.4 10/10/2023    HCT 45.8 10/10/2023    MCV 92 10/10/2023     10/10/2023         CMP  Sodium   Date Value Ref Range Status   10/10/2023 141 136 - 145 mmol/L Final     Potassium   Date Value Ref Range Status   10/10/2023 3.8 3.5 - 5.1 mmol/L Final     Chloride   Date Value Ref Range Status   10/10/2023 109 95 - 110 mmol/L Final     CO2   Date Value Ref Range Status   10/10/2023 25 23 - 29 " mmol/L Final     Glucose   Date Value Ref Range Status   10/10/2023 81 70 - 110 mg/dL Final     BUN   Date Value Ref Range Status   10/10/2023 9 6 - 20 mg/dL Final     Creatinine   Date Value Ref Range Status   10/10/2023 0.9 0.5 - 1.4 mg/dL Final     Calcium   Date Value Ref Range Status   10/10/2023 9.4 8.7 - 10.5 mg/dL Final     Total Protein   Date Value Ref Range Status   10/10/2023 7.9 6.0 - 8.4 g/dL Final     Albumin   Date Value Ref Range Status   10/10/2023 4.6 3.5 - 5.2 g/dL Final     Total Bilirubin   Date Value Ref Range Status   10/10/2023 0.5 0.1 - 1.0 mg/dL Final     Comment:     For infants and newborns, interpretation of results should be based  on gestational age, weight and in agreement with clinical  observations.    Premature Infant recommended reference ranges:  Up to 24 hours.............<8.0 mg/dL  Up to 48 hours............<12.0 mg/dL  3-5 days..................<15.0 mg/dL  6-29 days.................<15.0 mg/dL       Alkaline Phosphatase   Date Value Ref Range Status   10/10/2023 47 (L) 55 - 135 U/L Final     AST   Date Value Ref Range Status   10/10/2023 11 10 - 40 U/L Final     ALT   Date Value Ref Range Status   10/10/2023 7 (L) 10 - 44 U/L Final     Anion Gap   Date Value Ref Range Status   10/10/2023 7 (L) 8 - 16 mmol/L Final     eGFR   Date Value Ref Range Status   10/10/2023 >60.0 >60 mL/min/1.73 m^2 Final           Assessment:   1. Gastroparesis    2.      Constipation  3.      GERD  4.      Dysphagia-normal esophageal manometry and esophagus on EGD.    Plan:  -Start Remeron 15mg nightly for nausea, vomiting and appetite.  -Start Motegrity 2mg daily for constipation.    Ya Moreland MD

## 2023-11-20 ENCOUNTER — DOCUMENTATION ONLY (OUTPATIENT)
Dept: PHARMACY | Facility: CLINIC | Age: 28
End: 2023-11-20
Payer: MEDICAID

## 2024-01-16 ENCOUNTER — TELEPHONE (OUTPATIENT)
Dept: GASTROENTEROLOGY | Facility: CLINIC | Age: 29
End: 2024-01-16
Payer: MEDICAID

## 2024-01-16 NOTE — TELEPHONE ENCOUNTER
----- Message from Sofi Garcia MA sent at 1/16/2024 10:54 AM CST -----  Name of Who is Calling:JOSE RAGLAND [[id number]]                What is the request in detail: Pt is requesting a call back to get scheduled for stomach pain, no schedule coming up. Please assist.                Can the clinic reply by [facility]: No                What Number to Call Back if not in [facility]: [phone]

## 2024-01-17 ENCOUNTER — OFFICE VISIT (OUTPATIENT)
Dept: GASTROENTEROLOGY | Facility: CLINIC | Age: 29
End: 2024-01-17
Payer: MEDICAID

## 2024-01-17 DIAGNOSIS — K31.84 GASTROPARESIS: ICD-10-CM

## 2024-01-17 DIAGNOSIS — K21.9 GASTROESOPHAGEAL REFLUX DISEASE, UNSPECIFIED WHETHER ESOPHAGITIS PRESENT: Primary | ICD-10-CM

## 2024-01-17 PROCEDURE — 99214 OFFICE O/P EST MOD 30 MIN: CPT | Mod: 95,,, | Performed by: INTERNAL MEDICINE

## 2024-01-17 RX ORDER — PANTOPRAZOLE SODIUM 40 MG/1
40 TABLET, DELAYED RELEASE ORAL 2 TIMES DAILY
Qty: 60 TABLET | Refills: 11 | Status: SHIPPED | OUTPATIENT
Start: 2024-01-17 | End: 2025-01-16

## 2024-01-17 RX ORDER — METOCLOPRAMIDE 10 MG/1
10 TABLET ORAL EVERY 6 HOURS PRN
Qty: 120 TABLET | Refills: 6 | Status: SHIPPED | OUTPATIENT
Start: 2024-01-17 | End: 2024-02-28

## 2024-01-17 RX ORDER — FAMOTIDINE 20 MG/1
20 TABLET, FILM COATED ORAL 2 TIMES DAILY PRN
Qty: 60 TABLET | Refills: 11 | Status: SHIPPED | OUTPATIENT
Start: 2024-01-17 | End: 2025-01-16

## 2024-01-17 NOTE — PROGRESS NOTES
The patient location is: [place]  The chief complaint leading to consultation is: nausea    Visit type: audiovisual    Face to Face time with patient: 10 min  20 minutes of total time spent on the encounter, which includes face to face time and non-face to face time preparing to see the patient (eg, review of tests), Obtaining and/or reviewing separately obtained history, Documenting clinical information in the electronic or other health record, Independently interpreting results (not separately reported) and communicating results to the patient/family/caregiver, or Care coordination (not separately reported).         Each patient to whom he or she provides medical services by telemedicine is:  (1) informed of the relationship between the physician and patient and the respective role of any other health care provider with respect to management of the patient; and (2) notified that he or she may decline to receive medical services by telemedicine and may withdraw from such care at any time.    Notes:     [facility]    CC: nausea    HPI 28 y.o. femalewith past medical history of gastroparesis s/p G J tube placement and removal and pyloroplasty with follow up normal GES who presents for follow up with continued chronic, daily, nausea with associated vomiting, bloating, and decreased appetite.  Remeron made her too sleepy.  Zofran and phenergan are not helping.  Motegrity did help and she is now having one mushy yellowish stool per day.    Her acid reflux has worsened.  She is taking her Protonix twice per day and it is helping but she sometimes has breakthrough symptoms.    She is taking Reglan 5 mg TID with meals without relief.    Past Medical History  Past Medical History:   Diagnosis Date    Allergic sinusitis     Anxiety     Digestive disorder     ACID REFLUX    Dyspareunia due to medical condition in female     Endometriosis     GERD (gastroesophageal reflux disease)     Pelvic pain in female      Postpartum depression     Syncope     CHRONIC/RECURRING    UTI (urinary tract infection)     H/O       Physical Examination  LMP 10/06/2021   General appearance: alert, cooperative, she is tearful  HENT: Normocephalic, atraumatic, neck symmetrical, no nasal discharge   Neurologic: Alert and oriented X 3, no facial droop or dysarthria    Labs:  Lab Results   Component Value Date    WBC 5.48 10/10/2023    HGB 15.4 10/10/2023    HCT 45.8 10/10/2023    MCV 92 10/10/2023     10/10/2023         CMP  Sodium   Date Value Ref Range Status   10/10/2023 141 136 - 145 mmol/L Final     Potassium   Date Value Ref Range Status   10/10/2023 3.8 3.5 - 5.1 mmol/L Final     Chloride   Date Value Ref Range Status   10/10/2023 109 95 - 110 mmol/L Final     CO2   Date Value Ref Range Status   10/10/2023 25 23 - 29 mmol/L Final     Glucose   Date Value Ref Range Status   10/10/2023 81 70 - 110 mg/dL Final     BUN   Date Value Ref Range Status   10/10/2023 9 6 - 20 mg/dL Final     Creatinine   Date Value Ref Range Status   10/10/2023 0.9 0.5 - 1.4 mg/dL Final     Calcium   Date Value Ref Range Status   10/10/2023 9.4 8.7 - 10.5 mg/dL Final     Total Protein   Date Value Ref Range Status   10/10/2023 7.9 6.0 - 8.4 g/dL Final     Albumin   Date Value Ref Range Status   10/10/2023 4.6 3.5 - 5.2 g/dL Final     Total Bilirubin   Date Value Ref Range Status   10/10/2023 0.5 0.1 - 1.0 mg/dL Final     Comment:     For infants and newborns, interpretation of results should be based  on gestational age, weight and in agreement with clinical  observations.    Premature Infant recommended reference ranges:  Up to 24 hours.............<8.0 mg/dL  Up to 48 hours............<12.0 mg/dL  3-5 days..................<15.0 mg/dL  6-29 days.................<15.0 mg/dL       Alkaline Phosphatase   Date Value Ref Range Status   10/10/2023 47 (L) 55 - 135 U/L Final     AST   Date Value Ref Range Status   10/10/2023 11 10 - 40 U/L Final     ALT   Date Value  Ref Range Status   10/10/2023 7 (L) 10 - 44 U/L Final     Anion Gap   Date Value Ref Range Status   10/10/2023 7 (L) 8 - 16 mmol/L Final     eGFR   Date Value Ref Range Status   10/10/2023 >60.0 >60 mL/min/1.73 m^2 Final         Assessment:   1. Gastroesophageal reflux disease, unspecified whether esophagitis present    2. Gastroparesis    3.      Constipation    Plan:  -Increase Reglan to 10mg 4 times daily.  -Continue Protonix 40mg twice per day and start famotidine (pepcid) twice per day for breakthrough symptoms.  -Referral to the GI dietician to discuss a gastroparesis diet.  -Schedule the CT abdomen and pelvis previously ordered by Dr. Durán.  -Continue Motegrity for constipation.  -Consider cholestyramine once per day in the future for possible bile reflux.    Ya Moreland MD

## 2024-02-28 ENCOUNTER — OFFICE VISIT (OUTPATIENT)
Dept: URGENT CARE | Facility: CLINIC | Age: 29
End: 2024-02-28
Payer: MEDICAID

## 2024-02-28 VITALS
WEIGHT: 140 LBS | HEART RATE: 61 BPM | DIASTOLIC BLOOD PRESSURE: 79 MMHG | SYSTOLIC BLOOD PRESSURE: 112 MMHG | BODY MASS INDEX: 23.32 KG/M2 | HEIGHT: 65 IN | RESPIRATION RATE: 19 BRPM | OXYGEN SATURATION: 98 % | TEMPERATURE: 97 F

## 2024-02-28 DIAGNOSIS — K31.84 GASTROPARESIS: ICD-10-CM

## 2024-02-28 DIAGNOSIS — R11.2 NAUSEA VOMITING AND DIARRHEA: Primary | ICD-10-CM

## 2024-02-28 DIAGNOSIS — R19.7 NAUSEA VOMITING AND DIARRHEA: Primary | ICD-10-CM

## 2024-02-28 LAB
BILIRUB UR QL STRIP: NEGATIVE
CTP QC/QA: YES
CTP QC/QA: YES
GLUCOSE UR QL STRIP: NEGATIVE
KETONES UR QL STRIP: NEGATIVE
LEUKOCYTE ESTERASE UR QL STRIP: NEGATIVE
PH, POC UA: 5.5 (ref 5–8)
POC BLOOD, URINE: NEGATIVE
POC MOLECULAR INFLUENZA A AGN: NEGATIVE
POC MOLECULAR INFLUENZA B AGN: NEGATIVE
POC NITRATES, URINE: NEGATIVE
PROT UR QL STRIP: NEGATIVE
SARS-COV-2 AG RESP QL IA.RAPID: NEGATIVE
SP GR UR STRIP: 1.02 (ref 1–1.03)
UROBILINOGEN UR STRIP-ACNC: NORMAL (ref 0.1–1.1)

## 2024-02-28 PROCEDURE — 87811 SARS-COV-2 COVID19 W/OPTIC: CPT | Mod: QW,S$GLB,, | Performed by: PHYSICIAN ASSISTANT

## 2024-02-28 PROCEDURE — 81003 URINALYSIS AUTO W/O SCOPE: CPT | Mod: QW,S$GLB,, | Performed by: PHYSICIAN ASSISTANT

## 2024-02-28 PROCEDURE — 87502 INFLUENZA DNA AMP PROBE: CPT | Mod: QW,S$GLB,, | Performed by: PHYSICIAN ASSISTANT

## 2024-02-28 PROCEDURE — 99204 OFFICE O/P NEW MOD 45 MIN: CPT | Mod: S$GLB,,, | Performed by: PHYSICIAN ASSISTANT

## 2024-02-28 RX ORDER — PROMETHAZINE HYDROCHLORIDE 25 MG/1
25 TABLET ORAL EVERY 4 HOURS PRN
Qty: 20 TABLET | Refills: 0 | Status: SHIPPED | OUTPATIENT
Start: 2024-02-28

## 2024-02-28 RX ORDER — PREGABALIN 50 MG/1
50 CAPSULE ORAL 2 TIMES DAILY
COMMUNITY
Start: 2024-01-31

## 2024-02-28 RX ORDER — PROMETHAZINE HYDROCHLORIDE 25 MG/ML
25 INJECTION, SOLUTION INTRAMUSCULAR; INTRAVENOUS
Status: COMPLETED | OUTPATIENT
Start: 2024-02-28 | End: 2024-02-28

## 2024-02-28 RX ADMIN — PROMETHAZINE HYDROCHLORIDE 25 MG: 25 INJECTION, SOLUTION INTRAMUSCULAR; INTRAVENOUS at 09:02

## 2024-02-28 NOTE — PROGRESS NOTES
"Subjective:      Patient ID: Tess Rodríguez is a 29 y.o. female.    Vitals:  height is 5' 5" (1.651 m) and weight is 63.5 kg (140 lb). Her tympanic temperature is 97.4 °F (36.3 °C). Her blood pressure is 112/79 and her pulse is 61. Her respiration is 19 and oxygen saturation is 98%.     Chief Complaint: Emesis    Pt reports a dx of gastroparesis. Reports unsure if she got a stomach bug or not. Took regular meds and changed her patch today. Reports generalized body aches as well as chills, n/v/d and abdominal pain    Emesis   This is a new problem. The current episode started yesterday. The problem occurs 2 to 4 times per day. The problem has been unchanged. The emesis has an appearance of bile and stomach contents. There has been no fever. Associated symptoms include abdominal pain, chills, coughing, diarrhea, headaches, sweats and URI. Pertinent negatives include no dizziness or fever. Associated symptoms comments: Sore throat   . Treatments tried: cough drops, dayquil, scopomine patch (daily) The treatment provided no relief.       Constitution: Positive for chills. Negative for fever.   Respiratory:  Positive for cough.    Gastrointestinal:  Positive for abdominal pain, vomiting and diarrhea.   Neurological:  Positive for headaches. Negative for dizziness.      Objective:     Physical Exam   Constitutional: She is oriented to person, place, and time. She appears well-developed. She is cooperative.  Non-toxic appearance. She does not appear ill. No distress.   HENT:   Head: Normocephalic and atraumatic.   Ears:   Right Ear: Hearing, tympanic membrane, external ear and ear canal normal. impacted cerumen  Left Ear: Hearing, tympanic membrane, external ear and ear canal normal. impacted cerumen  Nose: Nose normal. No rhinorrhea or congestion.   Mouth/Throat: Mucous membranes are moist. No oropharyngeal exudate or posterior oropharyngeal erythema. Oropharynx is clear.   Eyes: Conjunctivae and lids are normal. No " scleral icterus.   Neck: Phonation normal. Neck supple.   Cardiovascular: Normal rate, regular rhythm and normal heart sounds.   No murmur heard.Exam reveals no gallop and no friction rub.   Pulmonary/Chest: Effort normal and breath sounds normal. No stridor. No respiratory distress. She has no wheezes. She has no rhonchi. She has no rales.   Abdominal: Normal appearance. She exhibits no distension. Soft. There is no abdominal tenderness. There is no left CVA tenderness and no right CVA tenderness.      Comments: Actively gagging, no emesis during exam   Neurological: She is alert and oriented to person, place, and time. Coordination normal.   Skin: Skin is intact, not diaphoretic and not pale.   Psychiatric: Her speech is normal and behavior is normal. Judgment and thought content normal.   Nursing note and vitals reviewed.      Assessment:     1. Nausea vomiting and diarrhea    2. Gastroparesis        Plan:       Nausea vomiting and diarrhea  -     POCT Influenza A/B MOLECULAR  -     POCT Urinalysis, Dipstick, Automated, W/O Scope  -     SARS Coronavirus 2 Antigen, POCT Manual Read  -     promethazine injection 25 mg  -     promethazine (PHENERGAN) 25 MG tablet; Take 1 tablet (25 mg total) by mouth every 4 (four) hours as needed for Nausea.  Dispense: 20 tablet; Refill: 0    Gastroparesis      Results for orders placed or performed in visit on 02/28/24   POCT Influenza A/B MOLECULAR   Result Value Ref Range    POC Molecular Influenza A Ag Negative Negative, Not Reported    POC Molecular Influenza B Ag Negative Negative, Not Reported     Acceptable Yes    POCT Urinalysis, Dipstick, Automated, W/O Scope   Result Value Ref Range    POC Blood, Urine Negative Negative    POC Bilirubin, Urine Negative Negative    POC Urobilinogen, Urine Normal 0.1 - 1.1    POC Ketones, Urine Negative Negative    POC Protein, Urine Negative Negative    POC Nitrates, Urine Negative Negative    POC Glucose, Urine Negative  Negative    pH, UA 5.5 5 - 8    POC Specific Gravity, Urine 1.025 1.003 - 1.029    POC Leukocytes, Urine Negative Negative   SARS Coronavirus 2 Antigen, POCT Manual Read   Result Value Ref Range    SARS Coronavirus 2 Antigen Negative Negative     Acceptable Yes        Reports some mild improvement in her symptoms. States that she feels like she needs to rest. Given strict ER precautions. Increase po fluids, advance diet as tolerated. Discussed with patient the importance of f/u with their primary care provider. Urged to go to the ER for any worsening signs or symptoms.

## 2024-02-28 NOTE — LETTER
February 28, 2024      [facility]  [address], [address]  [place] [address]  Phone: [phone]  Fax: [phone]       Patient: Tess Rodríguez   YOB: 1995  Date of Visit: 02/28/2024    To Whom It May Concern:    Katelynn Rodríguez  was at [facility] on 02/28/2024. The patient may return to work/school in 1-3 days as long as she is fever free and symptoms improve with no restrictions. If you have any questions or concerns, or if I can be of further assistance, please do not hesitate to contact me.    Sincerely,    Madeleine Bailey PA-C

## 2024-03-05 ENCOUNTER — TELEPHONE (OUTPATIENT)
Dept: GASTROENTEROLOGY | Facility: CLINIC | Age: 29
End: 2024-03-05
Payer: MEDICAID

## 2024-03-05 ENCOUNTER — PATIENT MESSAGE (OUTPATIENT)
Dept: GASTROENTEROLOGY | Facility: CLINIC | Age: 29
End: 2024-03-05
Payer: MEDICAID

## 2024-03-05 NOTE — TELEPHONE ENCOUNTER
----- Message from Joy Dodd sent at 3/5/2024  8:28 AM CST -----  Regarding: Appt Sooner  Contact: [phone]  Tess Rodríguez calling regarding Appointment Access  (message) for # pt is calling to speak to someone in regards to scheduling appt she has been trying to get an appt for a long time with provider. pt states provider told her if she feels worst to let her know and she can come in for a f/u please schedule pt asap. she has been trying to get a hold of someone.

## 2024-04-05 ENCOUNTER — PATIENT MESSAGE (OUTPATIENT)
Dept: GASTROENTEROLOGY | Facility: CLINIC | Age: 29
End: 2024-04-05
Payer: MEDICAID

## 2024-04-05 DIAGNOSIS — K31.84 GASTROPARESIS: Primary | ICD-10-CM

## 2024-04-05 RX ORDER — METOCLOPRAMIDE 10 MG/1
10 TABLET ORAL EVERY 6 HOURS PRN
Qty: 120 TABLET | Refills: 3 | Status: SHIPPED | OUTPATIENT
Start: 2024-04-05 | End: 2024-04-08 | Stop reason: SDUPTHER

## 2024-04-08 RX ORDER — GABAPENTIN 600 MG/1
600 TABLET ORAL 3 TIMES DAILY
Qty: 90 TABLET | Refills: 11 | Status: SHIPPED | OUTPATIENT
Start: 2024-04-08 | End: 2024-04-18

## 2024-04-08 RX ORDER — METOCLOPRAMIDE 10 MG/1
10 TABLET ORAL EVERY 6 HOURS PRN
Qty: 120 TABLET | Refills: 3 | Status: SHIPPED | OUTPATIENT
Start: 2024-04-08 | End: 2024-04-18

## 2024-04-09 ENCOUNTER — TELEPHONE (OUTPATIENT)
Dept: GASTROENTEROLOGY | Facility: CLINIC | Age: 29
End: 2024-04-09
Payer: MEDICAID

## 2024-04-09 NOTE — TELEPHONE ENCOUNTER
MA attempted to contact patient, but she did not answer. A voicemail was left for patient to return a call to our office.

## 2024-04-09 NOTE — TELEPHONE ENCOUNTER
----- Message from Ya Moreland MD sent at 4/9/2024  1:36 PM CDT -----  Regarding: RE: Pharmacy Call Back  I was sent a refill for the gabapentin.  Can you ask the patient is she takes both Lyrica and gabapentin?  ----- Message -----  From: Conchis Richardson MA  Sent: 4/9/2024   1:28 PM CDT  To: Ya Moreland MD  Subject: FW: Pharmacy Call Back                             ----- Message -----  From: Noreen Faulkner  Sent: 4/9/2024  12:01 PM CDT  To: Aniceto Pandya Staff  Subject: Pharmacy Call Back                               Type:  Pharmacy Calling to Clarify an RX    Name of Caller: Migdalia     Pharmacy Name: [facility]     Prescription Name: gabapentin (NEURONTIN) 600 MG tablet    What do they need to clarify? She's already on lyrica and wants to know if they are aware of it or if it's getting switched     Can you be contacted via [facility]? No     Best Call Back Number: [phone]    Additional Information:

## 2024-04-09 NOTE — TELEPHONE ENCOUNTER
----- Message from Noreen Faulkner sent at 4/9/2024 12:00 PM CDT -----  Regarding: Pharmacy Call Back  Type:  Pharmacy Calling to Clarify an RX    Name of Caller: Migdalia     Pharmacy Name: [facility]     Prescription Name: gabapentin (NEURONTIN) 600 MG tablet    What do they need to clarify? She's already on lyrica and wants to know if they are aware of it or if it's getting switched     Can you be contacted via [facility]? No     Best Call Back Number: [phone]    Additional Information:

## 2024-04-12 ENCOUNTER — TELEPHONE (OUTPATIENT)
Dept: GASTROENTEROLOGY | Facility: CLINIC | Age: 29
End: 2024-04-12
Payer: MEDICAID

## 2024-04-12 NOTE — TELEPHONE ENCOUNTER
I left a message for this patient in regards to medication question about lyrica 50 mg. I do not see anything in the notes stating patient was to stop so I called to verify.

## 2024-04-12 NOTE — TELEPHONE ENCOUNTER
----- Message from Bev Mahajan sent at 4/12/2024  1:32 PM CDT -----  Regarding: [facility] [phone]  .Type: Patient Call Back    Who called:[facility]     What is the request in detail: Rosalind is seeking advice if patient is stopping medication pregabalin (LYRICA) 50 MG capsule    Can the clinic reply by [facility]?no    Would the patient rather a call back or a response via [facility]?call back    Best call back number [phone]

## 2024-04-18 ENCOUNTER — OFFICE VISIT (OUTPATIENT)
Dept: GASTROENTEROLOGY | Facility: CLINIC | Age: 29
End: 2024-04-18
Payer: MEDICAID

## 2024-04-18 VITALS
DIASTOLIC BLOOD PRESSURE: 86 MMHG | SYSTOLIC BLOOD PRESSURE: 117 MMHG | HEIGHT: 65 IN | WEIGHT: 138.56 LBS | HEART RATE: 87 BPM | BODY MASS INDEX: 23.09 KG/M2

## 2024-04-18 DIAGNOSIS — R10.9 ABDOMINAL PAIN, UNSPECIFIED ABDOMINAL LOCATION: Primary | ICD-10-CM

## 2024-04-18 PROCEDURE — 99213 OFFICE O/P EST LOW 20 MIN: CPT | Mod: PBBFAC | Performed by: INTERNAL MEDICINE

## 2024-04-18 PROCEDURE — 99214 OFFICE O/P EST MOD 30 MIN: CPT | Mod: S$PBB,,, | Performed by: INTERNAL MEDICINE

## 2024-04-18 PROCEDURE — 99999 PR PBB SHADOW E&M-EST. PATIENT-LVL III: CPT | Mod: PBBFAC,,, | Performed by: INTERNAL MEDICINE

## 2024-04-18 PROCEDURE — 3079F DIAST BP 80-89 MM HG: CPT | Mod: CPTII,,, | Performed by: INTERNAL MEDICINE

## 2024-04-18 PROCEDURE — 3008F BODY MASS INDEX DOCD: CPT | Mod: CPTII,,, | Performed by: INTERNAL MEDICINE

## 2024-04-18 PROCEDURE — 3074F SYST BP LT 130 MM HG: CPT | Mod: CPTII,,, | Performed by: INTERNAL MEDICINE

## 2024-04-18 PROCEDURE — 1159F MED LIST DOCD IN RCRD: CPT | Mod: CPTII,,, | Performed by: INTERNAL MEDICINE

## 2024-04-18 RX ORDER — IMIPRAMINE HYDROCHLORIDE 25 MG/1
25 TABLET, FILM COATED ORAL NIGHTLY
Qty: 30 TABLET | Refills: 11 | Status: SHIPPED | OUTPATIENT
Start: 2024-04-18 | End: 2025-04-18

## 2024-04-18 NOTE — PROGRESS NOTES
"[facility]    CC: abdominal pain    HPI 29 y.o. femalewith past medical history of gastroparesis s/p G J tube placement and removal and pyloroplasty with follow up normal GES who presents with chronic, daily, generalized pain with abdominal bloating that is worse after eating.    She does not think that Reglan is helping.      She does think that Motegrity is helping her constipation.  She does have liquid bowel that comes out as a BM.    She is not interested in trying cholestyramine at this time.      Past Medical History  Past Medical History:   Diagnosis Date    Allergic sinusitis     Anxiety     Digestive disorder     ACID REFLUX    Dyspareunia due to medical condition in female     Endometriosis     GERD (gastroesophageal reflux disease)     Pelvic pain in female     Postpartum depression     Syncope     CHRONIC/RECURRING    UTI (urinary tract infection)     H/O         Physical Examination  /86   Pulse 87   Ht 5' 5" (1.651 m)   Wt 62.9 kg (138 lb 9 oz)   LMP 10/06/2021   BMI 23.06 kg/m²   General appearance: alert, cooperative, no distress  HENT: Normocephalic, atraumatic, neck symmetrical, no nasal discharge   Abdomen: soft, non-tender; non distended, no rebound or guarding  Neurologic: Alert and oriented X 3, moving all four extremities, intact sensation to light touch    Labs:  Lab Results   Component Value Date    WBC 5.48 10/10/2023    HGB 15.4 10/10/2023    HCT 45.8 10/10/2023    MCV 92 10/10/2023     10/10/2023         CMP  Sodium   Date Value Ref Range Status   10/10/2023 141 136 - 145 mmol/L Final     Potassium   Date Value Ref Range Status   10/10/2023 3.8 3.5 - 5.1 mmol/L Final     Chloride   Date Value Ref Range Status   10/10/2023 109 95 - 110 mmol/L Final     CO2   Date Value Ref Range Status   10/10/2023 25 23 - 29 mmol/L Final     Glucose   Date Value Ref Range Status   10/10/2023 81 70 - 110 mg/dL Final     BUN   Date Value Ref Range Status   10/10/2023 9 " 6 - 20 mg/dL Final     Creatinine   Date Value Ref Range Status   10/10/2023 0.9 0.5 - 1.4 mg/dL Final     Calcium   Date Value Ref Range Status   10/10/2023 9.4 8.7 - 10.5 mg/dL Final     Total Protein   Date Value Ref Range Status   10/10/2023 7.9 6.0 - 8.4 g/dL Final     Albumin   Date Value Ref Range Status   10/10/2023 4.6 3.5 - 5.2 g/dL Final     Total Bilirubin   Date Value Ref Range Status   10/10/2023 0.5 0.1 - 1.0 mg/dL Final     Comment:     For infants and newborns, interpretation of results should be based  on gestational age, weight and in agreement with clinical  observations.    Premature Infant recommended reference ranges:  Up to 24 hours.............<8.0 mg/dL  Up to 48 hours............<12.0 mg/dL  3-5 days..................<15.0 mg/dL  6-29 days.................<15.0 mg/dL       Alkaline Phosphatase   Date Value Ref Range Status   10/10/2023 47 (L) 55 - 135 U/L Final     AST   Date Value Ref Range Status   10/10/2023 11 10 - 40 U/L Final     ALT   Date Value Ref Range Status   10/10/2023 7 (L) 10 - 44 U/L Final     Anion Gap   Date Value Ref Range Status   10/10/2023 7 (L) 8 - 16 mmol/L Final     eGFR   Date Value Ref Range Status   10/10/2023 >60.0 >60 mL/min/1.73 m^2 Final         Assessment:   1. Abdominal pain, unspecified abdominal location    2.     Gastroparesis s/p pyloroplasty, most recent GES normal  3.     GERD  4.     Dysphagia    Plan:  -Stop Reglan as the patient does not feel that it is helping.  -Start Imipramine 25mg nightly to see if this helps with her abdominal pain and functional dysphagia.  -She was encouraged to eat small frequent meals throughout the day.  She should set an alarm to eat if she is forgetting.    Ya Moreland MD

## 2024-06-03 ENCOUNTER — TELEPHONE (OUTPATIENT)
Dept: GASTROENTEROLOGY | Facility: CLINIC | Age: 29
End: 2024-06-03
Payer: MEDICAID

## 2024-06-03 DIAGNOSIS — R10.9 ABDOMINAL PAIN, UNSPECIFIED ABDOMINAL LOCATION: Primary | ICD-10-CM

## 2024-06-03 RX ORDER — CHOLESTYRAMINE 4 G/9G
4 POWDER, FOR SUSPENSION ORAL
Qty: 270 PACKET | Refills: 3 | Status: SHIPPED | OUTPATIENT
Start: 2024-06-03 | End: 2025-06-03

## 2024-06-03 NOTE — TELEPHONE ENCOUNTER
Lázaro Balderrama, I sent in a prescription for cholestyramine for her to take three times per day with meals to see if that helps.

## 2024-06-03 NOTE — TELEPHONE ENCOUNTER
MA attempted to contact patient, but she did not answer. A voicemail was left informing patient a new prescription was sent to her pharmacy.

## 2024-06-03 NOTE — TELEPHONE ENCOUNTER
----- Message from Sharla Magallon sent at 6/3/2024  1:02 PM CDT -----  .Type: Patient Call Back    Who called: Self     What is the request in detail: Stated she's still having stomach pain. Feel like she's going to throw up. Would like to get an appointment     Can the clinic reply by [facility]? No      Would the patient rather a call back or a response via [facility]? Call Back     Best call back number: .[phone] (home)       Additional Information:

## 2024-06-03 NOTE — TELEPHONE ENCOUNTER
MA returned patient's call.     Mrs. Rodríguez is calling with complaints of abdominal pain, bloating, and nausea. Patient is taking Tofranil as prescribed. She is currently using Scopolamine patch for nausea, but it is not helping her symptoms.     Patient is also eating several small meals throughout the day.

## 2024-07-08 ENCOUNTER — TELEPHONE (OUTPATIENT)
Dept: GASTROENTEROLOGY | Facility: CLINIC | Age: 29
End: 2024-07-08
Payer: MEDICAID

## 2024-07-08 NOTE — TELEPHONE ENCOUNTER
----- Message from Jace Bhatt sent at 7/8/2024  2:55 PM CDT -----  Regarding: Arleen  Type:Patient Callback     Who called: Arleen     What is the request in detail: Stated that she has already sent in her symptoms to the doctor and it has been weeks since she has heard anything from the office. She needs for the doctor to reach out to her as soon as possible. She needs to schedule an appointment as soon as possible .     Can the clinic reply by [facility]? Yes     Would the patient rather a call back or a response via [facility]? Callback     Best call back number: [phone]      Additional Information:

## 2024-07-08 NOTE — TELEPHONE ENCOUNTER
"MA returned patient's call.     Patient is calling with complaints of experiencing constant abdominal pain, upper left quadrant, rated 5/10. The pain does get worse with eating. Her abdomen is also swollen.     She is constantly nauseous, but does not vomit. Per patient, Zofran, Phenergan, and Scopolamine patches does not help with her symptoms.     Patient is not taking cholestyramine because she is not eating. She also have concerns about taking the medication.     Due to not eating, she is losing weight. She went from 143-132, she is unsure the exact time frame, but believes it may be in a span of 2 or more weeks.     Patient stated, she has "to get back to work and is unable to answer any other questions at this time." She just wants the doctor to know, "she is nauseated, in pain, and can not function."  "

## 2024-07-08 NOTE — TELEPHONE ENCOUNTER
Hi,    Please let her know that labs do not need to be followed while taking that medication.  There is no indication for that.  If that provider feels that she needs labs then they can order them for her.  I am not sure what labs they are referring to.

## 2024-07-08 NOTE — TELEPHONE ENCOUNTER
MA returned patient's call, but she did not answer. A voicemail was left for patient to return a call to our office.

## 2024-07-08 NOTE — TELEPHONE ENCOUNTER
----- Message from Lupe Bolanos sent at 7/8/2024  1:06 PM CDT -----  Regarding: Appointment  Contact: [phone]  Calling to schedule appointment due to pain, nausea, weight loss, and more. She said she has called before and she is frustrated no one is calling her back.  Please contact patient as soon as possible.

## 2024-07-08 NOTE — TELEPHONE ENCOUNTER
----- Message from Jace Bhatt sent at 7/8/2024  2:55 PM CDT -----  Regarding: Arleen  Type:Patient Callback     Who called: Arleen     What is the request in detail: Stated that she has already sent in her symptoms to the doctor and it has been weeks since she has heard anything from the office. She needs for the doctor to reach out to her as soon as possible. She needs to schedule an appointment as soon as possible .     Can the clinic reply by [facility]? Yes     Would the patient rather a call back or a response via [facility]? Callback     Best call back number: [phone]      Additional Information:

## 2024-07-17 RX ORDER — SCOLOPAMINE TRANSDERMAL SYSTEM 1 MG/1
1 PATCH, EXTENDED RELEASE TRANSDERMAL
Qty: 10 PATCH | Refills: 6 | Status: SHIPPED | OUTPATIENT
Start: 2024-07-17

## 2024-07-17 NOTE — TELEPHONE ENCOUNTER
----- Message from Sofy Bar sent at 7/17/2024  1:10 PM CDT -----  .Type:  RX Refill Request    Who Called? PT     Refill or New Rx? REFILL     RX Name and Strength? scopolamine (TRANSDERM-SCOP) 1.3-1.5 mg (1 mg over 3 days)    Preferred Pharmacy with phone number? [facility]  [phone]    Pt Call Back Number?  [phone]    Additional Information: PLEASE FILL TODAY       Thank You

## 2024-08-22 ENCOUNTER — OFFICE VISIT (OUTPATIENT)
Dept: GASTROENTEROLOGY | Facility: CLINIC | Age: 29
End: 2024-08-22
Payer: MEDICAID

## 2024-08-22 ENCOUNTER — TELEPHONE (OUTPATIENT)
Dept: GASTROENTEROLOGY | Facility: CLINIC | Age: 29
End: 2024-08-22
Payer: MEDICAID

## 2024-08-22 VITALS
HEART RATE: 98 BPM | DIASTOLIC BLOOD PRESSURE: 79 MMHG | WEIGHT: 133.06 LBS | HEIGHT: 65 IN | BODY MASS INDEX: 22.17 KG/M2 | SYSTOLIC BLOOD PRESSURE: 116 MMHG

## 2024-08-22 DIAGNOSIS — R10.9 ABDOMINAL CRAMPING: Primary | ICD-10-CM

## 2024-08-22 DIAGNOSIS — R11.2 NAUSEA AND VOMITING, UNSPECIFIED VOMITING TYPE: ICD-10-CM

## 2024-08-22 PROCEDURE — 99999 PR PBB SHADOW E&M-EST. PATIENT-LVL III: CPT | Mod: PBBFAC,,, | Performed by: INTERNAL MEDICINE

## 2024-08-22 PROCEDURE — 99213 OFFICE O/P EST LOW 20 MIN: CPT | Mod: PBBFAC | Performed by: INTERNAL MEDICINE

## 2024-08-22 RX ORDER — APREPITANT 125 MG/1
125 CAPSULE ORAL
Qty: 8 CAPSULE | Refills: 11 | Status: SHIPPED | OUTPATIENT
Start: 2024-08-22

## 2024-08-22 RX ORDER — DICYCLOMINE HYDROCHLORIDE 20 MG/1
20 TABLET ORAL EVERY 6 HOURS PRN
Qty: 120 TABLET | Refills: 3 | Status: SHIPPED | OUTPATIENT
Start: 2024-08-22

## 2024-08-22 NOTE — TELEPHONE ENCOUNTER
----- Message from Breana Tamayo sent at 8/22/2024 10:27 AM CDT -----  Regarding: running late  Contact: [phone]  Pt calling in pt is running late due to traffic pt is coming from [place] please call to discuss further

## 2024-08-22 NOTE — PROGRESS NOTES
"[facility]    CC: abdominal pain    HPI 29 y.o. female with past medical history of gastroparesis s/p G J tube placement and removal and pyloroplasty with follow up normal GES who presents with chronic, daily, generalized abdominal pain with associated nausea and fullness.  She eats very little during the week due to her fullness.  On the weekends she smokes medical marijuana which helps with her symptoms and helps her to eat more.    She did not try the previously prescribed cholestyramine.    She does take Motegrity, she feels like it gives her diarrhea which helps.  Her stool is yellow.    She takes scopalamine, zofran and phenergan but nothing helps her nausea.    She reports she continues to lose weight.    She is taking Protonix BID and Pepcid BID and she feels that this helps.    She is seeing a psychologist.  No medications have been started yet.  She reports she was recently diagnosed with ADHD.    Past Medical History  Past Medical History:   Diagnosis Date    Allergic sinusitis     Anxiety     Digestive disorder     ACID REFLUX    Dyspareunia due to medical condition in female     Endometriosis     GERD (gastroesophageal reflux disease)     Pelvic pain in female     Postpartum depression     Syncope     CHRONIC/RECURRING    UTI (urinary tract infection)     H/O         Physical Examination  /79   Pulse 98   Ht 5' 5" (1.651 m)   Wt 60.3 kg (133 lb 0.8 oz)   LMP 10/06/2021   BMI 22.14 kg/m²   General appearance: alert, cooperative, no distress  HENT: Normocephalic, atraumatic, neck symmetrical, no nasal discharge   Abdomen: soft, non-tender; non distended, no rebound or guarding  Extremities: extremities symmetric; no clubbing, cyanosis, or edema  Neurologic: Alert and oriented X 3, moving all four extremities, intact sensation to light touch  Labs:  Lab Results   Component Value Date    WBC 10.50 08/10/2024    HGB 12.8 08/10/2024    HCT 36.7 (L) 08/10/2024    MCV 93 08/10/2024 "     (L) 08/10/2024         CMP  Sodium   Date Value Ref Range Status   08/10/2024 139 136 - 145 mmol/L Final     Potassium   Date Value Ref Range Status   08/10/2024 2.9 (L) 3.5 - 5.1 mmol/L Final     Chloride   Date Value Ref Range Status   08/10/2024 103 95 - 110 mmol/L Final     CO2   Date Value Ref Range Status   08/10/2024 26 23 - 29 mmol/L Final     Glucose   Date Value Ref Range Status   08/10/2024 110 (H) 74 - 106 mg/dL Final     BUN   Date Value Ref Range Status   08/10/2024 12 7 - 17 mg/dL Final     Creatinine   Date Value Ref Range Status   08/10/2024 0.85 0.70 - 1.20 mg/dL Final     Calcium   Date Value Ref Range Status   08/10/2024 8.9 8.4 - 10.2 mg/dL Final     Total Protein   Date Value Ref Range Status   08/10/2024 6.5 6.3 - 8.2 g/dL Final     Albumin   Date Value Ref Range Status   08/10/2024 4.0 3.5 - 5.2 g/dL Final     Total Bilirubin   Date Value Ref Range Status   08/10/2024 0.8 0.2 - 1.3 mg/dL Final     Alkaline Phosphatase   Date Value Ref Range Status   08/10/2024 37 (L) 38 - 145 U/L Final     AST   Date Value Ref Range Status   08/10/2024 20 14 - 36 U/L Final     ALT   Date Value Ref Range Status   08/10/2024 14 10 - 44 U/L Final     Anion Gap   Date Value Ref Range Status   08/10/2024 10 8 - 16 mmol/L Final     eGFR   Date Value Ref Range Status   08/10/2024 >60 >60 mL/min/1.73 m^2 Final           Assessment: The patient is a 28 yo female with past medical history of gastroparesis s/p G J tube placement and removal and pyloroplasty with follow up normal GES who presents for follow up of abdominal pain, nausea, vomiting.    Plan:  -Emend prescribed for nausea.  -Referral to the GI dietician for gastroparesis diet education.  -Bentyl as needed for abdominal pain.    Ya Moreland MD     No complaints

## 2024-12-18 DIAGNOSIS — K59.04 CHRONIC IDIOPATHIC CONSTIPATION: ICD-10-CM

## 2024-12-18 RX ORDER — PRUCALOPRIDE 2 MG/1
2 TABLET, FILM COATED ORAL DAILY
Qty: 30 TABLET | Refills: 11 | Status: SHIPPED | OUTPATIENT
Start: 2024-12-18

## 2024-12-19 ENCOUNTER — DOCUMENTATION ONLY (OUTPATIENT)
Dept: PHARMACY | Facility: CLINIC | Age: 29
End: 2024-12-19
Payer: MEDICAID

## 2025-01-09 ENCOUNTER — TELEPHONE (OUTPATIENT)
Dept: SURGERY | Facility: CLINIC | Age: 30
End: 2025-01-09
Payer: MEDICAID

## 2025-01-09 ENCOUNTER — PATIENT MESSAGE (OUTPATIENT)
Dept: SURGERY | Facility: CLINIC | Age: 30
End: 2025-01-09
Payer: MEDICAID

## 2025-01-09 NOTE — TELEPHONE ENCOUNTER
Spoke with patient  She wants appointment to discuss other surgical options s/p pyloric procedures in 2023.  Offered appointment with NP and she could be seen sooner, but she would like to see MD  Appointment offered and accepted for  2/11/25 at 11:00 am  She is going to send updated testing through [employer] [facility].

## 2025-01-09 NOTE — TELEPHONE ENCOUNTER
----- Message from Milli sent at 1/9/2025  1:08 PM CST -----  Name of Who is Calling:Pt         What is the request in detail:Pt would like a call back to a [facility] a appt. Please advise thank you         Can the clinic reply by [facility]:no        What Number to [facility]:Telephone Information:  Mobile          [phone]

## 2025-01-16 ENCOUNTER — TELEPHONE (OUTPATIENT)
Dept: SURGERY | Facility: CLINIC | Age: 30
End: 2025-01-16
Payer: MEDICAID

## 2025-01-16 NOTE — TELEPHONE ENCOUNTER
----- Message from Benigno sent at 1/16/2025  1:45 PM CST -----  Regarding: Call needed  Contact: [phone]  Hi,    Who called:The pt       Reason:Pt would like to know which medicaid plans does Dr. Durán accept? Pt says she need to change her coverage.  [phone]      Provider's name:Dr. Durán      Additional Information: Thank you

## 2025-01-16 NOTE — TELEPHONE ENCOUNTER
LVM for patient to call me back at [phone] re:  Too many variations on medicaid for us to keep track and that she should call whatever plan she is looking at and ask if Luis Armando is on that plan or maybe check with the business office.  Call with questions.

## 2025-01-29 ENCOUNTER — TELEPHONE (OUTPATIENT)
Dept: SURGERY | Facility: CLINIC | Age: 30
End: 2025-01-29
Payer: MEDICAID

## 2025-02-11 ENCOUNTER — OFFICE VISIT (OUTPATIENT)
Dept: SURGERY | Facility: CLINIC | Age: 30
End: 2025-02-11

## 2025-02-11 ENCOUNTER — TELEPHONE (OUTPATIENT)
Dept: ENDOSCOPY | Facility: HOSPITAL | Age: 30
End: 2025-02-11

## 2025-02-11 VITALS
HEART RATE: 91 BPM | WEIGHT: 127.31 LBS | HEIGHT: 65 IN | SYSTOLIC BLOOD PRESSURE: 128 MMHG | DIASTOLIC BLOOD PRESSURE: 84 MMHG | BODY MASS INDEX: 21.21 KG/M2

## 2025-02-11 DIAGNOSIS — Z13.31 POSITIVE DEPRESSION SCREENING: Primary | ICD-10-CM

## 2025-02-11 DIAGNOSIS — R07.9 CHEST PAIN, UNSPECIFIED TYPE: ICD-10-CM

## 2025-02-11 DIAGNOSIS — K31.84 GASTROPARESIS: ICD-10-CM

## 2025-02-11 DIAGNOSIS — K21.9 GASTROESOPHAGEAL REFLUX DISEASE, UNSPECIFIED WHETHER ESOPHAGITIS PRESENT: ICD-10-CM

## 2025-02-11 DIAGNOSIS — R10.13 EPIGASTRIC PAIN: ICD-10-CM

## 2025-02-11 PROCEDURE — 99999 PR PBB SHADOW E&M-EST. PATIENT-LVL IV: CPT | Mod: PBBFAC,,, | Performed by: SURGERY

## 2025-02-11 PROCEDURE — 99214 OFFICE O/P EST MOD 30 MIN: CPT | Mod: PBBFAC | Performed by: SURGERY

## 2025-02-11 PROCEDURE — 99214 OFFICE O/P EST MOD 30 MIN: CPT | Mod: S$PBB,,, | Performed by: SURGERY

## 2025-02-11 RX ORDER — TOPIRAMATE 100 MG/1
TABLET, FILM COATED ORAL
COMMUNITY

## 2025-02-11 RX ORDER — OXCARBAZEPINE 60 MG/ML
300 SUSPENSION ORAL 2 TIMES DAILY
COMMUNITY
Start: 2025-01-16

## 2025-02-11 RX ORDER — FLUOXETINE 20 MG/5ML
80 SOLUTION ORAL
COMMUNITY
Start: 2025-01-15

## 2025-02-11 NOTE — PATIENT INSTRUCTIONS
"Check [employer] website for self-pay assistance     Over-the-counter options for gastroparesis symptoms:     Medication Purpose Notes   FDgard Indigestion, bloating, nausea, upset stomach Take before meals  Contains guero oil and I-Menthol   IBgard Cramping, diarrhea, bloating, bowel urgency, constipation, gas Take daily  Contains peppermint oil   Iberogast Indigestion, heartburn, bloating, nausea, gas, abdominal discomfort, constipation/diarrhea Take daily  Contains iberis yaneth, peppermint, Irish chamomile, lemon balm, guero, licorice   Emetrol or Nauzene Rapid nausea relief    Boiron NauseaCalm Upset stomach, nausea, vomiting Contains Cocculus indicus, ipecacuanha, nux vomica, tabacum, petroleum   Stomach Settle drops Nausea, upset stomach Contains caryl, lemon, mint, vitamin B6   [employer] brand "Queasy Drops," "Queasy Drops Plus," "Preggie Pop Drops" Nausea, upset stomach Contains caryl, lemon, mint, vitamin B6   Dramamine Nausea Can try "less drowsy" versions   Imodium Diarrhea Take as directed if you aren't concerned you have a stomach virus   Laceteol DiarrhEase  Diarrhea Mainly contains probiotics   Motion sickness wrist band  Nausea Uses acupuncture knowledge to reduce the experience of nausea     "

## 2025-02-11 NOTE — PROGRESS NOTES
Minimally Invasive Surgery  Gastroparesis H&P      ASSESSMENT AND PLAN:     Chest pain  Advised patient that the safest course of action is going to the emergency room for assessment of her chest pain and that I highly recommend she go straight there from this appointment   Reviewed the associated symptoms/characteristics that are concerning to me   Gastroparesis symptoms are moderate to severe despite medical and surgical treatment.  This is improved from severe-extremely severe.  GERD and dysphagia symptoms are severe.    Patient's initial postop period demonstrated overall symptom improvement but as time has progressed her symptoms have worsened   She is currently primarily bothered by epigastric pain   She desires workup for stimulator placement.  The preop testing includes labs, 4-hr GES, UGI with SBF, EGD with Bravo monitoring, and cardiology clearance   It is possible her symptoms may be caused by SMA syndrome or GERD.  Additionally, her most recent emptying study was noted as abnormal but it is only slightly outside the normal range.  This was also true for her first emptying study.  We would like to utilize the information from the three tests to confirm the cause of her epigastric pain and nausea before placing a stimulator.   Constipation is moderate.  She manages with Motegrity.   Nutrition: This patient is unlikely to be malnourished.    Positive PHQ-2 and PHQ-9 scores.    I have reviewed the positive depression score which warrants active treatment with psychotherapy and/or medications.   She is actively undergoing psychotherapy and pharmacological interventions for depression and anxiety  No new orders or referrals today             RTC for f/u test results (may be VV), or sooner PRN: acutely worsened symptoms, need for additional medication to manage symptoms before surgery may be arranged.       Due to gastroparesis pathophysiology, this patient should not take GLP-1 agonists and opioids.   "    ______________________________________________________________________  SUBJECTIVE:     Ms. Rodríguez is a known patient of this clinic.  She provided the history.  Her partner, Harpreet, is also present.     Chief Complaint:  Gastroparesis    HPI:  Hillivana Rodríguez is a 30 y.o. y/o female with a BMI of 21.19 kg/m2 (weight of 127 lbs) and gastroparesis, GERD.  Pertinent surgical history includes pyloromyotomy (2023), PEG with/out J tube (2023) , and abdominal procedures (hysterectomy with BSO).  She has lost 13 lbs since LOV (9% of body weight).  She has visited an emergency facility 4 times in the last year.  She was not administered or prescribed narcotic medications at these visits.      She reports her most troubling symptom today is the epigastric pain, which restarted approximately 1 month ago.  It was helped by pyloroplasty in 2023 but has gotten worse recently.  She continued to have bloating and stomach pain postop, and recently nausea and vomiting have worsened.  She is developing some food fear 2/2 feeling so sick when eating.  It has not improved.  Eating makes the symptom worse and time makes the symptom better.  She has some symptoms of anxiety as well.      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           Gastroparesis Assessments  Symptoms 2/11/25:    Severity Frequency   Vomiting 2 2   Nausea 3 4   Early satiety 3 4   Bloating 2 4   Postprandial fullness 3 4   Epigastric pain 3 4   Epigastric burning 2 3   # episodes of vomiting in last 24 hours   0   Impression: Moderate to extremely severe GP symptoms      GERD Symptoms  yes PPI - protonix bid  yes Typical heartburn  yes Regurgitation  yes Dysphagia (yes solids, no liquids).  If yes, see Eckardt score, below.  yes Hoarseness  yes Sore throat  yes Cough  no Asthma  yes Chest pain: "chest tightness" and palpitations.  Does get SOB with the pain.  Feels like rib cage is bruised.  Central " chest wrapping around to her left side.  Eating starts the pain, smoking can relieve it if she's able to smoke at the time, otherwise just has to wait it out (lasts about 1 hour).  Gets SOB with exercise/activity (such as cleaning her house).  yes Water brash  yes Globus  yes Nausea  yes Vomiting    Eckardt Score:   Weight Loss (kg): 3 - More than 10 lbs.  Dysphagia: 2 - Daily  Retrosternal pain: 2 - Daily  Regurgitation: 1 - Occasional   Total Score: 8 (out of possible 12)   Impression: Severe GERD with significant dysphagia     Diet Assessment  Fluids: Well tolerated  Solids: Poorly tolerated (dysphagia, nausea, vomiting, and epigastric pain)  Nutrition supplements: Poorly tolerated (postprandial fullness and bloating)  Impression: Poor PO intake with risk for malnutrition.       Medications:  She has tried:   metoclopramide (Reglan) for motility (didn't help)  ondansetron, promethazine, scopolamine patches, and THC  for nausea and vomiting   dicyclomine for epigastric or abdominal pain  pantoprazole and famotidine for epigastric burning or GERD  prucalopride (Motegrity) for constipation   nothing for diarrhea  Of these, she is currently taking/smoking THC (weekends), patches (continuously), dicyclomine (qid), protonix (bid), famotidine (bid), and motegrity (qd).  She is unsatisfied with symptom control.   She does use THC   reviewed 2/11/25: Norco 10 picked up 1/2025, appears to be for finger lac repair.  Doesn't otherwise take narcotics.       Review of Systems:  Review of Systems   Constitutional:  Positive for malaise/fatigue and weight loss. Negative for chills and fever.   HENT:  Positive for sore throat.    Eyes: Negative.    Respiratory:  Positive for cough and shortness of breath.    Cardiovascular:  Positive for chest pain.   Gastrointestinal:  Positive for abdominal pain, constipation, heartburn, nausea and vomiting. Negative for blood in stool and diarrhea.   Genitourinary: Negative.     Musculoskeletal: Negative.    Skin: Negative.    Neurological:  Positive for dizziness, loss of consciousness (at home - when cleaning) and weakness. Negative for headaches.   Endo/Heme/Allergies:  Bruises/bleeds easily.   Psychiatric/Behavioral:  Positive for depression. Negative for suicidal ideas. The patient is nervous/anxious.          Past Medical History:  Past Medical History:   Diagnosis Date    Allergic sinusitis     Anxiety     Digestive disorder     ACID REFLUX    Dyspareunia due to medical condition in female     Endometriosis     GERD (gastroesophageal reflux disease)     Pelvic pain in female     Postpartum depression     Syncope     CHRONIC/RECURRING    UTI (urinary tract infection)     H/O          Past Surgical History:   Past Surgical History:   Procedure Laterality Date    COLONOSCOPY N/A 1/30/2020    Procedure: COLONOSCOPY;  Surgeon: Jonathon Chisholm MD;  Location: [facility];  Service: Endoscopy;  Laterality: N/A;    epidural for childbirth      ESOPHAGEAL MANOMETRY WITH MEASUREMENT OF IMPEDANCE N/A 10/2/2023    Procedure: MANOMETRY, ESOPHAGUS, WITH IMPEDANCE MEASUREMENT;  Surgeon: Ya Moreland MD;  Location: [facility] ([address] [place] [address]);  Service: Endoscopy;  Laterality: N/A;  Referred by: Dr. Jaimes (see Telephone Encounter dated 8/2/23)  Prep: N/A  Route instructions sent: portal  Other concerns: hx of gastroparesis - awaiting Gastric pacemaker to be placed in September SW 9/26-precall complete-Kpvt    ESOPHAGOGASTRODUODENOSCOPY N/A 1/30/2020    Procedure: ESOPHAGOGASTRODUODENOSCOPY (EGD);  Surgeon: Jonathon Chisholm MD;  Location: [facility];  Service: Endoscopy;  Laterality: N/A;    ESOPHAGOGASTRODUODENOSCOPY N/A 6/2/2023    Procedure: EGD (ESOPHAGOGASTRODUODENOSCOPY);  Surgeon: Estephania Jaimes MD;  Location: [facility];  Service: Endoscopy;  Laterality: N/A;    ESOPHAGOGASTRODUODENOSCOPY N/A 9/14/2023    Procedure: EGD (ESOPHAGOGASTRODUODENOSCOPY);  Surgeon: Clem Wise,  MD;  Location: [facility] ENDO (4TH FLR);  Service: Endoscopy;  Laterality: N/A;  inst. to [facility]  referral; Dr. Durán  -precall complete-mkp    HYSTERECTOMY      INSERTION, PEG TUBE  3/24/2023    Procedure: INSERTION, PEG TUBE;  Surgeon: Kelvin Durán MD;  [address];  Service: General;;    IUD placement and removal      LAPAROSCOPIC CHOLECYSTECTOMY N/A 2021    Procedure: CHOLECYSTECTOMY, LAPAROSCOPIC;  Surgeon: Phillip Nguyen MD;  Location: [facility];  Service: General;  Laterality: N/A;    LAPAROSCOPIC SALPINGECTOMY Bilateral 2021    Procedure: SALPINGECTOMY, LAPAROSCOPIC, bilateral;  Surgeon: Lev Giles Jr., MD;  Location: [facility] OR;  Service: OB/GYN;  Laterality: Bilateral;    LAPAROSCOPIC TOTAL HYSTERECTOMY N/A 2021    Procedure: HYSTERECTOMY, TOTAL, LAPAROSCOPIC;  Surgeon: Lev Giles Jr., MD;  Location: [facility] OR;  Service: OB/GYN;  Laterality: N/A;  11:30 per Omari, negative     PELVIC LAPAROSCOPY      POSTPARTUM LIGATION OF FALLOPIAN TUBE Bilateral 2020    Procedure: LIGATION, FALLOPIAN TUBE, POSTPARTUM;  Surgeon: Lev Giles Jr., MD;  Location: [facility] OR;  Service: OB/GYN;  Laterality: Bilateral;    WISDOM TOOTH EXTRACTION      XI ROBOTIC JEJUNOSTOMY N/A 3/24/2023    Procedure: XI ROBOTIC JEJUNOSTOMY;  Surgeon: Kelvin Durán MD;  [address];  Service: General;  Laterality: N/A;  NO NARCOTICS    XI ROBOTIC PYLOROMYOTOMY N/A 2023    Procedure: XI ROBOTIC PYLOROMYOTOMY;  Surgeon: Kelvin Durán MD;  [facility];  Service: General;  Laterality: N/A;          Social History:   Tobacco Use    Smoking status: Former     Current packs/day: 0.00     Types: Cigarettes     Quit date:      Years since quittin.1    Smokeless tobacco: Never    Tobacco comments:     Currently smokes medical marijuana    Substance and Sexual Activity    Alcohol use: Not Currently     Comment:  "occasionally    Drug use: Yes     Types: Marijuana     Comment: medical marijuana    Sexual activity: Yes     Partners: Male     Birth control/protection: None, See Surgical Hx          PHQ-2 Screenin/11/2025    11:04 AM   PHQ2 & PHQ9 Depression Results   Over the last two weeks how often have you been bothered by little interest or pleasure in doing things 3   Over the last two weeks how often have you been bothered by feeling down, depressed or hopeless 2   PHQ-2 Total Score 5   Over the last two weeks how often have you been bothered by trouble falling or staying asleep, or sleeping too much 2   Over the last two weeks how often have you been bothered by feeling tired or having little energy 3   Over the last two weeks how often have you been bothered by a poor appetite or overeating 3   Over the last two weeks how often have you been bothered by feeling bad about yourself - or that you are a failure or have let yourself or your family down 2   Over the last two weeks how often have you been bothered by trouble concentrating on things, such as reading the newspaper or watching television 1   Over the last two weeks how often have you been bothered by moving or speaking so slowly that other people could have noticed. Or the opposite - being so fidgety or restless that you have been moving around a lot more than usual. 2   Over the last two weeks how often have you been bothered by thoughts that you would be better off dead, or of hurting yourself 0   If you checked off any problems, how difficult have these problems made it for you to do your work, take care of things at home or get along with other people? Somewhat difficult   PHQ-9 Score 18           OBJECTIVE:       Vitals:  Vitals:    25 1101   BP: 128/84   Pulse: 91   Weight: 57.7 kg (127 lb 5.1 oz)   Height: 5' 5" (1.651 m)   PainSc:   5       Physical Exam  Vitals reviewed.   Constitutional:       Appearance: Normal appearance.   HENT:      " Head: Normocephalic and atraumatic.   Cardiovascular:      Rate and Rhythm: Normal rate and regular rhythm.   Pulmonary:      Effort: Pulmonary effort is normal.      Breath sounds: Normal breath sounds.   Abdominal:      General: Bowel sounds are normal. There is distension (bloating).      Palpations: Abdomen is soft.      Tenderness: There is abdominal tenderness in the epigastric area.   Skin:     General: Skin is warm and dry.   Neurological:      General: No focal deficit present.      Mental Status: She is alert and oriented to person, place, and time.   Psychiatric:         Mood and Affect: Mood and affect normal.         Behavior: Behavior normal.             Reviewed data:   CBC 1/19/25, unremarkable.   CMP 1/19/25, unremarkable.   EGD 9/14/23.  Report reviewed.  Normal examined duodenum.  Esophagus samples taken for EOE (benign squamous mucosa, no alteration, no increased epithelial cells, no dysplasia or malignancy).  Erythematous in the antrum and body (bx: mild chronic inactive gastritis, no HP).  Bilious gastric fluid, suctioned.  Duodenum sampled for celiac (mildly increased chronic inflammation, no dysplasia or malignancy).  No hiatal hernia.  CT 2/9/24.  Report and images reviewed with Dr. Durán.  Abdominal aorta normal, additional findings in report.  GES 11/25/24.  Report reviewed.  T-1/2 is 115.5 minutes   GES 8/17/23.  Report reviewed.  5% retained at 4 hours (normal is < 10% at 4 hours).   Esophageal manometry 10/3/23.  Report reviewed.  Normal result based on Harbeson Classification of Esophageal Motility version 4.0.  No evidence of rumination.   Colonoscopy 1/30/20.  Report reviewed.  Normal, additional findings in report.             Thank you for including me in the care of Ms. Rodríguez.  It was a pleasure speaking with her today.     Kalee Yeh, MSN, APRN, FNP-C  General Surgery   [facility]

## 2025-02-11 NOTE — TELEPHONE ENCOUNTER
Contacted the patient to schedule an endoscopy procedure(s) 2/11/25. The patient did not answer the call and left a voice message requesting a call back.

## 2025-02-11 NOTE — TELEPHONE ENCOUNTER
"----- Message from Med Assistant Fonseca sent at 2025 12:03 PM CST -----  Regarding: EGD / Bravo  Procedure: EGD/Bravo    Diagnosis: Gastroparesis     Procedure Timin-12 weeks    #If within 4 weeks selected, please nikolai as high priority#    #If greater than 12 weeks, please select "5-12 weeks" and delay sending until 3 months prior to requested date#       Additional Scheduling Information: Not urgent but if patient can be scheduled in 4 weeks, that would be ideal.    Is the patient taking a GLP-1 Agonist and/or blood thinner :no    Have you attached a patient to this message: yes  "

## 2025-02-17 ENCOUNTER — TELEPHONE (OUTPATIENT)
Dept: ENDOSCOPY | Facility: HOSPITAL | Age: 30
End: 2025-02-17
Payer: MEDICAID

## 2025-02-19 ENCOUNTER — TELEPHONE (OUTPATIENT)
Dept: ENDOSCOPY | Facility: HOSPITAL | Age: 30
End: 2025-02-19
Payer: MEDICAID

## 2025-02-19 VITALS — HEIGHT: 65 IN | WEIGHT: 122 LBS | BODY MASS INDEX: 20.33 KG/M2

## 2025-02-19 DIAGNOSIS — K31.84 GASTROPARESIS: Primary | ICD-10-CM

## 2025-02-19 NOTE — TELEPHONE ENCOUNTER
Referral for procedure from [facility]      Spoke to pt to schedule procedure(s) Upper Endoscopy (EGD) with [facility]       Physician to perform procedure(s) Dr. TRAMAINE Rendon  Date of Procedure (s) 3/18/25  Arrival Time 9:30 AM  Time of Procedure(s) 10:30 AM   Location of Procedure(s) [place] 2nd Floor  Type of Rx Prep sent to patient: N/A - gastroparesis prep  Instructions provided to patient via [facility]    Patient was informed on the following information and verbalized understanding. Screening questionnaire reviewed with patient and complete. If procedure requires anesthesia, a responsible adult needs to be present to accompany the patient home, patient cannot drive after receiving anesthesia. Appointment details are tentative, especially check-in time. Patient will receive a prep-op call 7 days prior to confirm check-in time for procedure. If applicable the patient should contact their pharmacy to verify Rx for procedure prep is ready for pick-up. Patient was advised to call the scheduling department at [phone] if pharmacy states no Rx is available. Patient was advised to call the endoscopy scheduling department if any questions or concerns arise.       Endoscopy Scheduling Department

## 2025-03-07 ENCOUNTER — ANESTHESIA EVENT (OUTPATIENT)
Dept: ENDOSCOPY | Facility: HOSPITAL | Age: 30
End: 2025-03-07
Payer: MEDICAID

## 2025-03-07 NOTE — ANESTHESIA PREPROCEDURE EVALUATION
03/07/2025  Tess Rodríguez is a 30 y.o., female.  [facility]  Anesthesia Pre-Operative Evaluation       Patient Name: Tess Rodríguez  YOB: 1995  MRN: [id number]  CSN: [id number]      Code Status: Prior   Date of Procedure: 3/18/2025  Anesthesia: Choice Procedure: Procedure(s) (LRB):  EGD (ESOPHAGOGASTRODUODENOSCOPY) (N/A)  PH MONITORING, ESOPHAGUS, WIRELESS, (OFF REFLUX MEDS) (N/A)  Pre-Operative Diagnosis: Gastroparesis [K31.84]  Proceduralist: Surgeons and Role:     * Bartolo Rendon MD - Primary        SUBJECTIVE:   Tess Rodríguez is a 30 y.o. female who  has a past medical history of Allergic sinusitis, Anxiety, Digestive disorder, Dyspareunia due to medical condition in female, Endometriosis, GERD (gastroesophageal reflux disease), Pelvic pain in female, Postpartum depression, Syncope, and UTI (urinary tract infection)..     she has a current medication list which includes the following long-term medication(s): famotidine, fluoxetine, oxcarbazepine, pantoprazole, pregabalin, sumatriptan, and topiramate.     ALLERGIES:     Review of patient's allergies indicates:   Allergen Reactions    Wellbutrin [bupropion hcl] Other (See Comments)     Suicidal thoughts     LDA:          Lines/Drains/Airways       None                  Anesthesia Evaluation      Airway   Dental      Pulmonary    Cardiovascular     ECG reviewed    Neuro/Psych    (+) psychiatric history    GI/Hepatic/Renal    (+) GERD    Endo/Other    Abdominal                     MEDICATIONS:     Antibiotics (From admission, onward)      None          VTE Risk Mitigation (From admission, onward)      None              Current Medications[1]       History:   There are no hospital problems to display for this patient.    Surgical History:    has a past surgical history that includes Pelvic laparoscopy; IUD  placement and removal; Esophagogastroduodenoscopy (N/A, 1/30/2020); Colonoscopy (N/A, 1/30/2020); Postpartum ligation of fallopian tube (Bilateral, 9/30/2020); epidural for childbirth; [place] tooth extraction; Laparoscopic total hysterectomy (N/A, 7/27/2021); Laparoscopic salpingectomy (Bilateral, 7/27/2021); Hysterectomy; Laparoscopic cholecystectomy (N/A, 12/14/2021); xi robotic jejunostomy (N/A, 3/24/2023); insertion, peg tube (3/24/2023); xi robotic pyloromyotomy (N/A, 5/19/2023); Esophagogastroduodenoscopy (N/A, 6/2/2023); Esophagogastroduodenoscopy (N/A, 9/14/2023); and Esophageal manometry with measurement of impedance (N/A, 10/2/2023).   Social History:    reports being sexually active and has had partner(s) who are male. She reports using the following methods of birth control/protection: None and See Surgical Hx.  reports that she quit smoking about 8 years ago. Her smoking use included cigarettes. She has never used smokeless tobacco. She reports that she does not currently use alcohol. She reports current drug use. Drug: Marijuana.     OBJECTIVE:     Vital Signs (Most Recent):    Vital Signs Range (Last 24H):          There is no height or weight on file to calculate BMI.   Wt Readings from Last 4 Encounters:   02/19/25 55.3 kg (122 lb)   02/11/25 57.7 kg (127 lb 5.1 oz)   01/19/25 53.5 kg (118 lb)   08/22/24 60.3 kg (133 lb 0.8 oz)       Significant Labs:  Lab Results   Component Value Date    WBC 5.40 01/19/2025    HGB 14.1 01/19/2025    HCT 40.9 01/19/2025     01/19/2025     01/19/2025    K 3.5 01/19/2025     01/19/2025    CREATININE 1.0 01/19/2025    BUN 23 (H) 01/19/2025    CO2 25 01/19/2025     01/19/2025    CALCIUM 8.8 01/19/2025    MG 1.8 08/10/2024    PHOS 4.3 05/20/2023    ALKPHOS 46 01/19/2025    ALT 12 01/19/2025    AST 13 01/19/2025    ALBUMIN 4.0 01/19/2025    INR 1.0 05/16/2020    APTT 27.0 05/16/2020    TROPONINI 0.021 01/19/2025    PREGTESTUR Negative 02/02/2023  "   HCGQUANT 32009 04/19/2020     Patient's last menstrual period was 10/06/2021.  No results found for this or any previous visit (from the past 72 hours).    EKG:   Results for orders placed or performed in visit on 01/19/25   EKG 12-lead    Collection Time: 01/19/25  1:01 PM   Result Value Ref Range    QRS Duration 82 ms    [facility] QTC Calculation 420 ms    Narrative    Test Reason : R07.9    Vent. Rate :  73 BPM     Atrial Rate :  73 BPM     P-R Int : 128 ms          QRS Dur :  82 ms      QT Int : 382 ms       P-R-T Axes :  88  89  62 degrees    QTcB Int : 420 ms    Normal sinus rhythm  Within normal limits  When compared with ECG of 31-Mar-2023 17:49,  No significant change was found  Confirmed by Justice Briscoe ([id number]) on 1/24/2025 7:46:17 AM    Referred By: AAAREFERRAL SELF           Confirmed By: Justice Briscoe       TTE:  No results found for this or any previous visit.  No results found for: "EF"   No results found for this or any previous visit.  BRENT:  No results found for this or any previous visit.  Stress Test:  No results found for this or any previous visit.     LHC:  No results found for this or any previous visit.     PFT:  No results found for: "FEV1", "FVC", "LYY0IGV", "TLC", "DLCO"     ASSESSMENT/PLAN:         Pre-op Assessment    I have reviewed the Patient Summary Reports.     I have reviewed the Nursing Notes. I have reviewed the NPO Status.   I have reviewed the Medications.     Review of Systems  Anesthesia Hx:  No problems with previous Anesthesia             Denies Family Hx of Anesthesia complications.    Denies Personal Hx of Anesthesia complications.                    Social:  Former Smoker, No Alcohol Use       Hematology/Oncology:  Hematology Normal   Oncology Normal                                   EENT/Dental:  EENT/Dental Normal           Cardiovascular:  Cardiovascular Normal                  ECG has been reviewed.                            Pulmonary:  Pulmonary Normal             "           Renal/:  Renal/ Normal                 Hepatic/GI:     GERD                Musculoskeletal:  Musculoskeletal Normal                Neurological:  Neurology Normal                                      Endocrine:  Endocrine Normal            Dermatological:  Skin Normal    Psych:  Psychiatric History                     Anesthesia Plan  Type of Anesthesia, risks & benefits discussed:    Anesthesia Type: Gen Natural Airway  Intra-op Monitoring Plan: Standard ASA Monitors  Induction:  IV  Informed Consent: Informed consent signed with the Patient and all parties understand the risks and agree with anesthesia plan.  All questions answered. Patient consented to blood products? No  ASA Score: 2  Day of Surgery Review of History & Physical: H&P Update referred to the surgeon/provider.    Ready For Surgery From Anesthesia Perspective.     .           [1]   No current facility-administered medications for this encounter.     Current Outpatient Medications   Medication Sig Dispense Refill    dicyclomine (BENTYL) 20 mg tablet Take 1 tablet (20 mg total) by mouth every 6 (six) hours as needed. 120 tablet 3    famotidine (PEPCID) 20 MG tablet Take 1 tablet (20 mg total) by mouth 2 (two) times daily as needed for Heartburn (and acid reflux not controlled with Pantoprazole twice per day). 60 tablet 11    FLUoxetine (PROZAC) 20 mg/5 mL (4 mg/mL) solution Take 80 mg by mouth.      OXcarbazepine (TRILEPTAL) 300 mg/5 mL (60 mg/mL) Susp Take 300 mg by mouth 2 (two) times daily.      pantoprazole (PROTONIX) 40 MG tablet Take 1 tablet (40 mg total) by mouth 2 (two) times daily. 60 tablet 11    pregabalin (LYRICA) 50 MG capsule Take 50 mg by mouth 2 (two) times daily.      promethazine (PHENERGAN) 25 MG tablet Take 1 tablet (25 mg total) by mouth every 4 (four) hours as needed for Nausea. 20 tablet 0    prucalopride (MOTEGRITY) 2 mg Tab Take 2 mg by mouth once daily. 30 tablet 11    scopolamine (TRANSDERM-SCOP) 1.3-1.5 mg (1  mg over 3 days) Place 1 patch onto the skin every 72 hours. 10 patch 6    sumatriptan (IMITREX) 50 MG tablet Take 50 mg by mouth every 2 (two) hours as needed for Migraine.      topiramate (TOPAMAX) 100 MG tablet

## 2025-03-10 ENCOUNTER — TELEPHONE (OUTPATIENT)
Dept: SURGERY | Facility: CLINIC | Age: 30
End: 2025-03-10
Payer: MEDICAID

## 2025-03-10 NOTE — TELEPHONE ENCOUNTER
Spoke with patient  Needs to reschedule Ugi with SBFT (currently scheduled on 3/18 but has EGD that day.  Has GES on 3/12  Can we get the UGI rescheduled between those dates.  Advised I would contact [facility] Scheduling to see if that is possible and would let her know    LVM at [facility] to call back on [phone] to reschedule.

## 2025-03-10 NOTE — TELEPHONE ENCOUNTER
----- Message from Lori sent at 3/10/2025  1:27 PM CDT -----  Regarding: Patient concerns  Contact: pt [phone]  Type:  Needs Medical AdviceWho Called: Arleen called in regards to pt says she's having a problem scheduling a [place] with a follow through test pt needs to know what to doWould the patient rather a call back or a response via [facility]? Call back Best Call Back Number: pt [phone] Additional Information:

## 2025-03-10 NOTE — TELEPHONE ENCOUNTER
Shari returned my call from [facility] Scheduling  She will contact patient and get her set up for 3/13  She said should be no problem for a day after GES.

## 2025-03-11 ENCOUNTER — TELEPHONE (OUTPATIENT)
Dept: SURGERY | Facility: CLINIC | Age: 30
End: 2025-03-11
Payer: MEDICAID

## 2025-03-11 ENCOUNTER — PATIENT MESSAGE (OUTPATIENT)
Dept: ENDOSCOPY | Facility: HOSPITAL | Age: 30
End: 2025-03-11
Payer: MEDICAID

## 2025-03-11 NOTE — TELEPHONE ENCOUNTER
Called and LVM at [phone] and [phone] to see why patient gastric emptying study was rescheduled from 3/12 to 3/14 and to please contact either me or the patient to explain what happened.  Pt has already made arrangements to be off of work on the 12th and the 13th, not 3/14.

## 2025-03-12 ENCOUNTER — TELEPHONE (OUTPATIENT)
Dept: SURGERY | Facility: CLINIC | Age: 30
End: 2025-03-12
Payer: MEDICAID

## 2025-03-18 ENCOUNTER — ANESTHESIA (OUTPATIENT)
Dept: ENDOSCOPY | Facility: HOSPITAL | Age: 30
End: 2025-03-18
Payer: MEDICAID

## 2025-03-18 ENCOUNTER — HOSPITAL ENCOUNTER (OUTPATIENT)
Facility: HOSPITAL | Age: 30
Discharge: HOME | End: 2025-03-18
Attending: INTERNAL MEDICINE | Admitting: INTERNAL MEDICINE
Payer: MEDICAID

## 2025-03-18 VITALS
HEIGHT: 65 IN | TEMPERATURE: 99 F | BODY MASS INDEX: 20.66 KG/M2 | SYSTOLIC BLOOD PRESSURE: 123 MMHG | RESPIRATION RATE: 20 BRPM | DIASTOLIC BLOOD PRESSURE: 83 MMHG | HEART RATE: 89 BPM | WEIGHT: 124 LBS | OXYGEN SATURATION: 100 %

## 2025-03-18 DIAGNOSIS — K31.84 GASTROPARESIS: Primary | ICD-10-CM

## 2025-03-18 DIAGNOSIS — K21.9 GERD (GASTROESOPHAGEAL REFLUX DISEASE): ICD-10-CM

## 2025-03-18 DIAGNOSIS — K21.9 GASTROESOPHAGEAL REFLUX DISEASE, UNSPECIFIED WHETHER ESOPHAGITIS PRESENT: ICD-10-CM

## 2025-03-18 PROCEDURE — 37000009 HC ANESTHESIA EA ADD 15 MINS: Performed by: INTERNAL MEDICINE

## 2025-03-18 PROCEDURE — 99900035 HC TECH TIME PER 15 MIN (STAT)

## 2025-03-18 PROCEDURE — 63600175 PHARM REV CODE 636 W HCPCS: Performed by: NURSE ANESTHETIST, CERTIFIED REGISTERED

## 2025-03-18 PROCEDURE — 27200942: Performed by: INTERNAL MEDICINE

## 2025-03-18 PROCEDURE — 25000003 PHARM REV CODE 250: Performed by: INTERNAL MEDICINE

## 2025-03-18 PROCEDURE — 91035 G-ESOPH REFLX TST W/ELECTROD: CPT | Mod: TC | Performed by: INTERNAL MEDICINE

## 2025-03-18 PROCEDURE — 94761 N-INVAS EAR/PLS OXIMETRY MLT: CPT

## 2025-03-18 PROCEDURE — 37000008 HC ANESTHESIA 1ST 15 MINUTES: Performed by: INTERNAL MEDICINE

## 2025-03-18 PROCEDURE — 43235 EGD DIAGNOSTIC BRUSH WASH: CPT | Mod: 59 | Performed by: INTERNAL MEDICINE

## 2025-03-18 RX ORDER — SODIUM CHLORIDE 9 MG/ML
INJECTION, SOLUTION INTRAVENOUS CONTINUOUS
Status: DISCONTINUED | OUTPATIENT
Start: 2025-03-18 | End: 2025-03-18 | Stop reason: HOSPADM

## 2025-03-18 RX ORDER — LIDOCAINE HYDROCHLORIDE 20 MG/ML
INJECTION, SOLUTION EPIDURAL; INFILTRATION; INTRACAUDAL; PERINEURAL
Status: DISCONTINUED | OUTPATIENT
Start: 2025-03-18 | End: 2025-03-18

## 2025-03-18 RX ORDER — PROPOFOL 10 MG/ML
VIAL (ML) INTRAVENOUS
Status: DISCONTINUED | OUTPATIENT
Start: 2025-03-18 | End: 2025-03-18

## 2025-03-18 RX ADMIN — PROPOFOL 100 MG: 10 INJECTION, EMULSION INTRAVENOUS at 10:03

## 2025-03-18 RX ADMIN — GLYCOPYRROLATE 0.2 MG: 0.2 INJECTION INTRAMUSCULAR; INTRAVENOUS at 10:03

## 2025-03-18 RX ADMIN — SODIUM CHLORIDE: 0.9 INJECTION, SOLUTION INTRAVENOUS at 10:03

## 2025-03-18 RX ADMIN — LIDOCAINE HYDROCHLORIDE 80 MG: 20 INJECTION, SOLUTION EPIDURAL; INFILTRATION; INTRACAUDAL; PERINEURAL at 10:03

## 2025-03-18 RX ADMIN — PROPOFOL 150 MCG/KG/MIN: 10 INJECTION, EMULSION INTRAVENOUS at 10:03

## 2025-03-18 NOTE — H&P
Short Stay Endoscopy History and Physical    PCP - Eusebio Escamilla MD    Procedure - EGD with Bravo off PPI  Sedation: GA  ASA - per anesthesia  Mallampati - per anesthesia  History of Anesthesia problems - no  Family history Anesthesia problems -  no     HPI:  This is a 30 y.o. female here for evaluation of : GERD and Gastroparesis    Reflux - yes  Dysphagia - no  Abdominal pain - no  Diarrhea - no    ROS:  Constitutional: No fevers, chills, No weight loss  ENT: No allergies  CV: No chest pain  Pulm: No cough, No shortness of breath  Ophtho: No vision changes  GI: see HPI  Medical History:  has a past medical history of Allergic sinusitis, Anxiety, Digestive disorder, Dyspareunia due to medical condition in female, Endometriosis, GERD (gastroesophageal reflux disease), Pelvic pain in female, Postpartum depression, Syncope, and UTI (urinary tract infection).    Surgical History:  has a past surgical history that includes Pelvic laparoscopy; IUD placement and removal; Esophagogastroduodenoscopy (N/A, 1/30/2020); Colonoscopy (N/A, 1/30/2020); Postpartum ligation of fallopian tube (Bilateral, 9/30/2020); epidural for childbirth; [place] tooth extraction; Laparoscopic total hysterectomy (N/A, 7/27/2021); Laparoscopic salpingectomy (Bilateral, 7/27/2021); Hysterectomy; Laparoscopic cholecystectomy (N/A, 12/14/2021); xi robotic jejunostomy (N/A, 3/24/2023); insertion, peg tube (3/24/2023); xi robotic pyloromyotomy (N/A, 5/19/2023); Esophagogastroduodenoscopy (N/A, 6/2/2023); Esophagogastroduodenoscopy (N/A, 9/14/2023); and Esophageal manometry with measurement of impedance (N/A, 10/2/2023).    Family History: family history includes Birth defects in her sister; Diabetes in her maternal grandmother and paternal grandfather; No Known Problems in her mother; Stomach cancer in her paternal grandfather.. Otherwise no colon cancer, inflammatory bowel disease, or GI malignancies.    Social History:  reports that she quit smoking  about 8 years ago. Her smoking use included cigarettes. She has never used smokeless tobacco. She reports that she does not currently use alcohol. She reports current drug use. Drug: Marijuana.    Review of patient's allergies indicates:   Allergen Reactions    Wellbutrin [bupropion hcl] Other (See Comments)     Suicidal thoughts       Medications:   Prescriptions Prior to Admission[1]    Objective Findings:    Vital Signs: Per nursing notes.    Physical Exam:  General Appearance: Well appearing in no acute distress  Head:   Normocephalic, without obvious abnormality  Eyes:    No scleral icterus  Airway: Open  Neck: No restriction in mobility  Lungs: CTA bilaterally in anterior and posterior fields, no wheezes, no crackles.  Heart:  Regular rate and rhythm, S1, S2 normal, no murmurs heard  Abdomen: Soft, non tender, non distended      Labs:  Lab Results   Component Value Date    WBC 5.40 01/19/2025    HGB 14.1 01/19/2025    HCT 40.9 01/19/2025     01/19/2025    ALT 12 01/19/2025    AST 13 01/19/2025     01/19/2025    K 3.5 01/19/2025     01/19/2025    CREATININE 1.0 01/19/2025    BUN 23 (H) 01/19/2025    CO2 25 01/19/2025    TSH 0.918 04/08/2022    INR 1.0 05/16/2020         I have explained the risks and benefits of endoscopy procedures to the patient including but not limited to bleeding, perforation, infection, and death.    Thank you so much for allowing me to participate in the care of [facility] Karen Marcos Rendon MD         [1]   Medications Prior to Admission   Medication Sig Dispense Refill Last Dose/Taking    dicyclomine (BENTYL) 20 mg tablet Take 1 tablet (20 mg total) by mouth every 6 (six) hours as needed. 120 tablet 3 3/15/2025    famotidine (PEPCID) 20 MG tablet Take 1 tablet (20 mg total) by mouth 2 (two) times daily as needed for Heartburn (and acid reflux not controlled with Pantoprazole twice per day). 60 tablet 11     FLUoxetine (PROZAC) 20 mg/5 mL (4 mg/mL) solution  Take 80 mg by mouth.   3/15/2025    OXcarbazepine (TRILEPTAL) 300 mg/5 mL (60 mg/mL) Susp Take 300 mg by mouth 2 (two) times daily.   3/15/2025    pantoprazole (PROTONIX) 40 MG tablet Take 1 tablet (40 mg total) by mouth 2 (two) times daily. 60 tablet 11     pregabalin (LYRICA) 50 MG capsule Take 50 mg by mouth 2 (two) times daily.   3/15/2025    promethazine (PHENERGAN) 25 MG tablet Take 1 tablet (25 mg total) by mouth every 4 (four) hours as needed for Nausea. 20 tablet 0 3/15/2025    prucalopride (MOTEGRITY) 2 mg Tab Take 2 mg by mouth once daily. 30 tablet 11 3/15/2025    scopolamine (TRANSDERM-SCOP) 1.3-1.5 mg (1 mg over 3 days) Place 1 patch onto the skin every 72 hours. 10 patch 6 3/15/2025    sumatriptan (IMITREX) 50 MG tablet Take 50 mg by mouth every 2 (two) hours as needed for Migraine.   3/15/2025    topiramate (TOPAMAX) 100 MG tablet    3/15/2025

## 2025-03-18 NOTE — PROVATION PATIENT INSTRUCTIONS
Discharge Summary/Instructions after an Endoscopic Procedure  Patient Name: Tess Rodríguez  Patient MRN: [id number]  Patient YOB: 1995 Tuesday, March 18, 2025  Bartolo Rendon MD  Dear patient,  As a result of recent federal legislation (The Federal Cures Act), you may   receive lab or pathology results from your procedure in your [facility]   account before your physician is able to contact you. Your physician or   their representative will relay the results to you with their   recommendations at their soonest availability.  Thank you,  RESTRICTIONS:  During your procedure today, you received medications for sedation.  These   medications may affect your judgment, balance and coordination.  Therefore,   for 24 hours, you have the following restrictions:   - DO NOT drive a car, operate machinery, make legal/financial decisions,   sign important papers or drink alcohol.    ACTIVITY:  Today: no heavy lifting, straining or running due to procedural   sedation/anesthesia.  The following day: return to full activity including work.  DIET:  Eat and drink normally unless instructed otherwise.     TREATMENT FOR COMMON SIDE EFFECTS:  - Mild abdominal pain, nausea, belching, bloating or excessive gas:  rest,   eat lightly and use a heating pad.  - Sore Throat: treat with throat lozenges and/or gargle with warm salt   water.  - Because air was used during the procedure, expelling large amounts of air   from your rectum or belching is normal.  - If a bowel prep was taken, you may not have a bowel movement for 1-3 days.    This is normal.  SYMPTOMS TO WATCH FOR AND REPORT TO YOUR PHYSICIAN:  1. Abdominal pain or bloating, other than gas cramps.  2. Chest pain.  3. Back pain.  4. Signs of infection such as: chills or fever occurring within 24 hours   after the procedure.  5. Rectal bleeding, which would show as bright red, maroon, or black stools.   (A tablespoon of blood from the rectum is not serious, especially  if   hemorrhoids are present.)  6. Vomiting.  7. Weakness or dizziness.  GO DIRECTLY TO THE NEAREST EMERGENCY ROOM IF YOU HAVE ANY OF THE FOLLOWING:      Difficulty breathing              Chills and/or fever over 101 F   Persistent vomiting and/or vomiting blood   Severe abdominal pain   Severe chest pain   Black, tarry stools   Bleeding- more than one tablespoon   Any other symptom or condition that you feel may need urgent attention  Your doctor recommends these additional instructions:  If any biopsies were taken, your doctors clinic will contact you in 1 to 2   weeks with any results.  - Patient has a contact number available for emergencies.  The signs and   symptoms of potential delayed complications were discussed with the   patient.  Return to normal activities tomorrow.  Written discharge   instructions were provided to the patient.   - Discharge patient to home.   - Resume previous diet.   - Continue present medications.  For questions, problems or results please call your physician - Bartolo Rendon MD at Work:  [phone].  [facility], EMERGENCY ROOM PHONE NUMBER: [phone]  IF A COMPLICATION OR EMERGENCY SITUATION ARISES AND YOU ARE UNABLE TO REACH   YOUR PHYSICIAN - GO DIRECTLY TO THE EMERGENCY ROOM.  Bartolo Rendon MD  3/18/2025 11:09:27 AM  This report has been verified and signed electronically.  Dear patient,  As a result of recent federal legislation (The Federal Cures Act), you may   receive lab or pathology results from your procedure in your [facility]   account before your physician is able to contact you. Your physician or   their representative will relay the results to you with their   recommendations at their soonest availability.  Thank you,  PROVATION

## 2025-03-18 NOTE — PLAN OF CARE
Pt in preop bay 1, VSS and IV inserted. Pt denies any open wounds on body or the use of any weight loss injections. Pt needs an  updated H&P, procedural consents, and anesthesia consents, otherwise ready to roll.

## 2025-03-18 NOTE — TRANSFER OF CARE
"Anesthesia Transfer of Care Note    Patient: Tess Rodríguez    Procedure(s) Performed: Procedure(s) (LRB):  EGD (ESOPHAGOGASTRODUODENOSCOPY) (N/A)  PH MONITORING, ESOPHAGUS, WIRELESS, (OFF REFLUX MEDS) (N/A)    Patient location: GI    Anesthesia Type: general    Transport from OR: Transported from OR on room air with adequate spontaneous ventilation    Post pain: adequate analgesia    Post assessment: no apparent anesthetic complications    Post vital signs: stable    Level of consciousness: awake and alert    Nausea/Vomiting: no nausea/vomiting    Complications: none    Transfer of care protocol was followed      Last vitals: Visit Vitals  BP 98/56 (BP Location: Left arm)   Pulse 96   Temp 37.2 °C (99 °F) (Temporal)   Resp 18   Ht 5' 5" (1.651 m)   Wt 56.2 kg (124 lb)   LMP 10/06/2021   SpO2 100%   Breastfeeding No   BMI 20.63 kg/m²     "

## 2025-03-19 NOTE — ANESTHESIA POSTPROCEDURE EVALUATION
Anesthesia Post Evaluation    Patient: Tess Rodríguez    Procedure(s) Performed: Procedure(s) (LRB):  EGD (ESOPHAGOGASTRODUODENOSCOPY) (N/A)  PH MONITORING, ESOPHAGUS, WIRELESS, (OFF REFLUX MEDS) (N/A)    Final Anesthesia Type: general      Patient location during evaluation: GI PACU  Patient participation: Yes- Able to Participate  Level of consciousness: awake and alert, oriented and awake  Post-procedure vital signs: reviewed and stable  Pain management: adequate  Airway patency: patent  FARRAH mitigation strategies: Multimodal analgesia  PONV status at discharge: No PONV  Anesthetic complications: no      Cardiovascular status: blood pressure returned to baseline and hemodynamically stable  Respiratory status: unassisted and spontaneous ventilation  Hydration status: euvolemic  Follow-up not needed.              Vitals Value Taken Time   /81 03/18/25 11:22   Temp 37.2 °C (99 °F) 03/18/25 11:04   Pulse 79 03/18/25 11:22   Resp 27 03/18/25 11:22   SpO2 100 % 03/18/25 11:22   Vitals shown include unfiled device data.      Event Time   Out of Recovery 11:19:00         Pain/Mary Ann Score: Mary Ann Score: 10 (3/18/2025 11:12 AM)

## 2025-03-25 ENCOUNTER — RESULTS FOLLOW-UP (OUTPATIENT)
Dept: SURGERY | Facility: CLINIC | Age: 30
End: 2025-03-25

## 2025-03-26 PROCEDURE — 91035 G-ESOPH REFLX TST W/ELECTROD: CPT | Mod: 26,,, | Performed by: INTERNAL MEDICINE

## 2025-03-26 NOTE — PROVATION PATIENT INSTRUCTIONS
Discharge Summary/Instructions after an Endoscopic Procedure  Patient Name: Tess Rodríguez  Patient MRN: [id number]  Patient YOB: 1995 Wednesday, March 26, 2025  Bartolo Rendon MD  Dear patient,  As a result of recent federal legislation (The Federal Cures Act), you may   receive lab or pathology results from your procedure in your [facility]   account before your physician is able to contact you. Your physician or   their representative will relay the results to you with their   recommendations at their soonest availability.  Thank you,  RESTRICTIONS:  During your procedure today, you received medications for sedation.  These   medications may affect your judgment, balance and coordination.  Therefore,   for 24 hours, you have the following restrictions:   - DO NOT drive a car, operate machinery, make legal/financial decisions,   sign important papers or drink alcohol.    ACTIVITY:  Today: no heavy lifting, straining or running due to procedural   sedation/anesthesia.  The following day: return to full activity including work.  DIET:  Eat and drink normally unless instructed otherwise.     TREATMENT FOR COMMON SIDE EFFECTS:  - Mild abdominal pain, nausea, belching, bloating or excessive gas:  rest,   eat lightly and use a heating pad.  - Sore Throat: treat with throat lozenges and/or gargle with warm salt   water.  - Because air was used during the procedure, expelling large amounts of air   from your rectum or belching is normal.  - If a bowel prep was taken, you may not have a bowel movement for 1-3 days.    This is normal.  SYMPTOMS TO WATCH FOR AND REPORT TO YOUR PHYSICIAN:  1. Abdominal pain or bloating, other than gas cramps.  2. Chest pain.  3. Back pain.  4. Signs of infection such as: chills or fever occurring within 24 hours   after the procedure.  5. Rectal bleeding, which would show as bright red, maroon, or black stools.   (A tablespoon of blood from the rectum is not serious, especially  if   hemorrhoids are present.)  6. Vomiting.  7. Weakness or dizziness.  GO DIRECTLY TO THE NEAREST EMERGENCY ROOM IF YOU HAVE ANY OF THE FOLLOWING:      Difficulty breathing              Chills and/or fever over 101 F   Persistent vomiting and/or vomiting blood   Severe abdominal pain   Severe chest pain   Black, tarry stools   Bleeding- more than one tablespoon   Any other symptom or condition that you feel may need urgent attention  Your doctor recommends these additional instructions:  If any biopsies were taken, your doctors clinic will contact you in 1 to 2   weeks with any results.  - Return to referring physician.  For questions, problems or results please call your physician - Bartolo Rendon MD at Work:  [phone].  [facility], EMERGENCY ROOM PHONE NUMBER: [phone]  IF A COMPLICATION OR EMERGENCY SITUATION ARISES AND YOU ARE UNABLE TO REACH   YOUR PHYSICIAN - GO DIRECTLY TO THE EMERGENCY ROOM.  Bartolo Rendon MD  3/26/2025 2:06:54 PM  This report has been verified and signed electronically.  Dear patient,  As a result of recent federal legislation (The Federal Cures Act), you may   receive lab or pathology results from your procedure in your [facility]   account before your physician is able to contact you. Your physician or   their representative will relay the results to you with their   recommendations at their soonest availability.  Thank you,  PROVATION

## 2025-03-28 ENCOUNTER — TELEPHONE (OUTPATIENT)
Dept: ENDOSCOPY | Facility: HOSPITAL | Age: 30
End: 2025-03-28
Payer: MEDICAID

## 2025-03-28 VITALS — BODY MASS INDEX: 20.83 KG/M2 | WEIGHT: 125 LBS | HEIGHT: 65 IN

## 2025-03-28 DIAGNOSIS — K21.9 GASTROESOPHAGEAL REFLUX DISEASE, UNSPECIFIED WHETHER ESOPHAGITIS PRESENT: Primary | ICD-10-CM

## 2025-03-28 DIAGNOSIS — K44.9 HIATAL HERNIA: ICD-10-CM

## 2025-03-28 NOTE — TELEPHONE ENCOUNTER
Referral for procedure from [facility]      Spoke to [facility] to schedule procedure(s) Esophageal Manometry       Physician to perform procedure(s) Dr. DEEDEE Moreland  Date of Procedure (s) 4/29/25  Arrival Time 7:00 AM  Time of Procedure(s) 8:00 AM   Location of Procedure(s) [facility] [address] Floor  Type of Rx Prep sent to patient: Other  Instructions provided to patient via [facility]    Patient was informed on the following information and verbalized understanding. Screening questionnaire reviewed with patient and complete. No ride arrangements are required for this procedure.   Appointment details are tentative, especially check-in time. Patient will receive a prep-op call 7 days prior to confirm check-in time for procedure. If applicable the patient should contact their pharmacy to verify Rx for procedure prep is ready for pick-up. Patient was advised to call the scheduling department at [phone] if pharmacy states no Rx is available. Patient was advised to call the endoscopy scheduling department if any questions or concerns arise.       Endoscopy Scheduling Department

## 2025-03-28 NOTE — TELEPHONE ENCOUNTER
"----- Message from RYAN Stephens sent at 3/27/2025 11:00 AM CDT -----  Regarding: Pt needs manometry  Procedure: ManometryDiagnosis: Hiatal Hernia; GERD Procedure Timing: Within 4 weeks (Urgent)#If within 4 weeks selected, please nikolai as high priority##If greater than 12 weeks, please select "5-12 weeks" and delay sending until 3 months prior to requested date# Additional Scheduling Information: Patient needs manometry; had [facility] on 3/26/25Is the patient taking a GLP-1 Agonist and/or blood thinner :noHave you attached a patient to this message: yes  "

## 2025-04-02 ENCOUNTER — TELEPHONE (OUTPATIENT)
Dept: SURGERY | Facility: CLINIC | Age: 30
End: 2025-04-02
Payer: MEDICAID

## 2025-04-02 NOTE — TELEPHONE ENCOUNTER
----- Message from Savanna sent at 4/2/2025  1:00 PM CDT -----  Regarding: Patient Advice  Contact: [phone]  Type:  Test ResultsWho Called: Patient Name of Test (Lab/Mammo/Etc): EGDDate of Test: [phone] Ordering Provider: Dr. Durán Where the test was performed: 3/18Would the patient rather a call back or a response via [facility]? callbackBe Call Back Number: [phone]

## 2025-04-29 ENCOUNTER — HOSPITAL ENCOUNTER (OUTPATIENT)
Facility: HOSPITAL | Age: 30
Discharge: HOME | End: 2025-04-29
Attending: INTERNAL MEDICINE | Admitting: SURGERY
Payer: MEDICAID

## 2025-04-29 ENCOUNTER — OFFICE VISIT (OUTPATIENT)
Dept: SURGERY | Facility: CLINIC | Age: 30
End: 2025-04-29
Payer: MEDICAID

## 2025-04-29 ENCOUNTER — TELEPHONE (OUTPATIENT)
Dept: SURGERY | Facility: CLINIC | Age: 30
End: 2025-04-29
Payer: MEDICAID

## 2025-04-29 VITALS
HEART RATE: 80 BPM | BODY MASS INDEX: 21.64 KG/M2 | WEIGHT: 130.06 LBS | SYSTOLIC BLOOD PRESSURE: 113 MMHG | TEMPERATURE: 98 F | RESPIRATION RATE: 16 BRPM | OXYGEN SATURATION: 98 % | DIASTOLIC BLOOD PRESSURE: 70 MMHG

## 2025-04-29 DIAGNOSIS — K63.8219 SMALL INTESTINAL BACTERIAL OVERGROWTH (SIBO): ICD-10-CM

## 2025-04-29 DIAGNOSIS — K31.84 GASTROPARESIS: Primary | ICD-10-CM

## 2025-04-29 DIAGNOSIS — R13.10 DYSPHAGIA: ICD-10-CM

## 2025-04-29 PROCEDURE — 25000003 PHARM REV CODE 250: Performed by: INTERNAL MEDICINE

## 2025-04-29 PROCEDURE — 91010 ESOPHAGUS MOTILITY STUDY: CPT | Mod: 26,,, | Performed by: INTERNAL MEDICINE

## 2025-04-29 PROCEDURE — 91037 ESOPH IMPED FUNCTION TEST: CPT | Mod: TC | Performed by: INTERNAL MEDICINE

## 2025-04-29 PROCEDURE — 91010 ESOPHAGUS MOTILITY STUDY: CPT | Mod: TC | Performed by: INTERNAL MEDICINE

## 2025-04-29 PROCEDURE — 91037 ESOPH IMPED FUNCTION TEST: CPT | Mod: 26,,, | Performed by: INTERNAL MEDICINE

## 2025-04-29 RX ORDER — LIDOCAINE HYDROCHLORIDE 20 MG/ML
JELLY TOPICAL ONCE
Status: COMPLETED | OUTPATIENT
Start: 2025-04-29 | End: 2025-04-29

## 2025-04-29 RX ADMIN — LIDOCAINE HYDROCHLORIDE 10 ML: 20 JELLY TOPICAL at 07:04

## 2025-04-29 NOTE — PROGRESS NOTES
The patient location is: in [place]  The chief complaint leading to consultation is: gastroparesis    Visit type: audiovisual      28 minutes of total time spent on the encounter, which includes face to face time and non-face to face time preparing to see the patient (eg, review of tests), Obtaining and/or reviewing separately obtained history, Documenting clinical information in the electronic or other health record, Independently interpreting results (not separately reported) and communicating results to the patient/family/caregiver, or Care coordination (not separately reported).         Each patient to whom he or she provides medical services by telemedicine is:  (1) informed of the relationship between the physician and patient and the respective role of any other health care provider with respect to management of the patient; and (2) notified that he or she may decline to receive medical services by telemedicine and may withdraw from such care at any time.    Notes:     29y/o with gerd and gastroparesis s/p peg with j tube 3/24/23 and s/p robotic pyloromyotomy 5/19/23.       Interval history 7/6/23: Her symptoms are slightly worse than last visit but she is eating more and gaining weight.  She has severe to extremely severe epigastric pain, moderate to extremely severe nausea and mild vomiting.  She has not been to the ER when the j tube fell out and it was in place.  She wants her j tube removed.     Interval history 8/22/23: She has severe to extremely severe epigastric pain, severe nausea and mild vomiting.  Severe but improved from preop.  She appears to have significant gerd despite medication.     Interval history 10/10/23: She was doing better but recently has been doing worse.  She has severe to extremely severe epigastric pain, and nausea and severe vomiting.  She is taking reglan 10 bid (only when trying to eat), zofran bid, otc med didn't help and ppi bid.  She has lost 18lb since her last visit here.   She has also been lightheaded and has seen neurology and ruled out for seizures.    Interval history 4/29/25:  She has severe symptoms and has had to miss work a few times in the last year.  She has vomiting 1 to several times a week (moderate), nausea 3-4 times a week (severe), and pain constant (severe).  Her bloating is extremely severe.  She is stopped reglan (didn't help), taking dicyclomine tid, using scopolamine patches, stopped zofran (stopped helping), ppi bid, and pepcid daily.  She occasionally burps with regurgitation.  She eats once a day, usually just dinner so that her symptoms don't worsen at work.  She is on motegrity for constipation which does keep her regulated.  She was 137lb last time I saw her and around 130lb now.  She generally stays around the same weight but does lose weight during the week and gains it on the weekend.  Her peg and j tubes are removed.      shows one narcotics rx in 2/2022 but none since.       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      shows no recent narcotic rx.     Preop studies  Ugi with small bowel 2022 reviewed, films viewed    -Delayed gastric emptying consistent with the clinical diagnosis of gastro paresis.    -Normal appearing small bowel.    -Questionable area in the cervical esophagus.  Would consider endoscopy.  Ct 2022 reviewed, films viewed    -No abnormality.    -Stomach appears ok  Ct 2023 (for peg tube site pain), reviewed, films viewed    1. Peg tube in the stomach, no abscess and appears appropriately placed to me    2. Previous cholecystectomy    3. There is a catheter in the left abdomen question if this is a drainage catheter    4. No bowel obstruction  Ges 2022 reviewed    -At 241 minutes, gastric emptying was noted to be 61%, c/w gastroparesis  Egd 2020 reviewed    - Normal esophagus.     - Gastritis. Biopsied.     - Normal examined duodenum.     - Biopsies were taken with a cold forceps for          histology in the second portion of the duodenum.   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  Some gerd symptoms and constipation.  She may have small bowel dysmotility as well so may have SIBO.  Obtain egd and ct.  Obtain sitzmarker test for constipation.  Start sibo treatment, xifaxin.  Consider gstim but she has medicaid.

## 2025-04-29 NOTE — PROVATION PATIENT INSTRUCTIONS
Discharge Summary/Instructions after an Endoscopic Procedure  Patient Name: Tess Rodríguez  Patient MRN: [id number]  Patient YOB: 1995 Tuesday, April 29, 2025  Ya Moreland MD  Dear patient,  As a result of recent federal legislation (The Federal Cures Act), you may   receive lab or pathology results from your procedure in your [facility]   account before your physician is able to contact you. Your physician or   their representative will relay the results to you with their   recommendations at their soonest availability.  Thank you,  RESTRICTIONS:  During your procedure today, you received medications for sedation.  These   medications may affect your judgment, balance and coordination.  Therefore,   for 24 hours, you have the following restrictions:   - DO NOT drive a car, operate machinery, make legal/financial decisions,   sign important papers or drink alcohol.    ACTIVITY:  Today: no heavy lifting, straining or running due to procedural   sedation/anesthesia.  The following day: return to full activity including work.  DIET:  Eat and drink normally unless instructed otherwise.     TREATMENT FOR COMMON SIDE EFFECTS:  - Mild abdominal pain, nausea, belching, bloating or excessive gas:  rest,   eat lightly and use a heating pad.  - Sore Throat: treat with throat lozenges and/or gargle with warm salt   water.  - Because air was used during the procedure, expelling large amounts of air   from your rectum or belching is normal.  - If a bowel prep was taken, you may not have a bowel movement for 1-3 days.    This is normal.  SYMPTOMS TO WATCH FOR AND REPORT TO YOUR PHYSICIAN:  1. Abdominal pain or bloating, other than gas cramps.  2. Chest pain.  3. Back pain.  4. Signs of infection such as: chills or fever occurring within 24 hours   after the procedure.  5. Rectal bleeding, which would show as bright red, maroon, or black stools.   (A tablespoon of blood from the rectum is not serious, especially  if   hemorrhoids are present.)  6. Vomiting.  7. Weakness or dizziness.  GO DIRECTLY TO THE NEAREST EMERGENCY ROOM IF YOU HAVE ANY OF THE FOLLOWING:      Difficulty breathing              Chills and/or fever over 101 F   Persistent vomiting and/or vomiting blood   Severe abdominal pain   Severe chest pain   Black, tarry stools   Bleeding- more than one tablespoon   Any other symptom or condition that you feel may need urgent attention  Your doctor recommends these additional instructions:  If any biopsies were taken, your doctors clinic will contact you in 1 to 2   weeks with any results.  Follow up with your referring provider.  For questions, problems or results please call your physician - Ya Moreland MD at Work:  [phone].  [facility], EMERGENCY ROOM PHONE NUMBER: [phone]  IF A COMPLICATION OR EMERGENCY SITUATION ARISES AND YOU ARE UNABLE TO REACH   YOUR PHYSICIAN - GO DIRECTLY TO THE EMERGENCY ROOM.  Ya Moreland MD  4/29/2025 5:19:00 PM  This report has been verified and signed electronically.  Dear patient,  As a result of recent federal legislation (The Federal Cures Act), you may   receive lab or pathology results from your procedure in your [facility]   account before your physician is able to contact you. Your physician or   their representative will relay the results to you with their   recommendations at their soonest availability.  Thank you,  PROVATION

## 2025-04-29 NOTE — OR NURSING
Esophageal manometry completed. Tolerated well. Discharge instructions and AVS reviewed verbally and understood. Expected, moderate, left naris, throat, ear, eustachian tube, and facial irritation / discomfort reported during procedure and at time of discharge.

## 2025-05-06 ENCOUNTER — OFFICE VISIT (OUTPATIENT)
Dept: SURGERY | Facility: CLINIC | Age: 30
End: 2025-05-06
Payer: MEDICAID

## 2025-05-06 DIAGNOSIS — K31.84 GASTROPARESIS: Primary | ICD-10-CM

## 2025-05-06 DIAGNOSIS — R10.13 EPIGASTRIC PAIN: ICD-10-CM

## 2025-05-06 PROCEDURE — 1159F MED LIST DOCD IN RCRD: CPT | Mod: CPTII,95,, | Performed by: SURGERY

## 2025-05-06 PROCEDURE — 98005 SYNCH AUDIO-VIDEO EST LOW 20: CPT | Mod: 95,,, | Performed by: SURGERY

## 2025-05-06 PROCEDURE — 1160F RVW MEDS BY RX/DR IN RCRD: CPT | Mod: CPTII,95,, | Performed by: SURGERY

## 2025-05-06 NOTE — PROGRESS NOTES
The patient location is: in [place]  The chief complaint leading to consultation is: gastroparesis     Visit type: audiovisual        20 minutes of total time spent on the encounter, which includes face to face time and non-face to face time preparing to see the patient (eg, review of tests), Obtaining and/or reviewing separately obtained history, Documenting clinical information in the electronic or other health record, Independently interpreting results (not separately reported) and communicating results to the patient/family/caregiver, or Care coordination (not separately reported).            Each patient to whom he or she provides medical services by telemedicine is:  (1) informed of the relationship between the physician and patient and the respective role of any other health care provider with respect to management of the patient; and (2) notified that he or she may decline to receive medical services by telemedicine and may withdraw from such care at any time.     Notes:      27y/o with gerd and gastroparesis s/p peg with j tube 3/24/23 and s/p robotic pyloromyotomy 5/19/23.  She is s/p gokul.     Interval history 7/6/23: Her symptoms are slightly worse than last visit but she is eating more and gaining weight.  She has severe to extremely severe epigastric pain, moderate to extremely severe nausea and mild vomiting.  She has not been to the ER when the j tube fell out and it was in place.  She wants her j tube removed.     Interval history 8/22/23: She has severe to extremely severe epigastric pain, severe nausea and mild vomiting.  Severe but improved from preop.  She appears to have significant gerd despite medication.     Interval history 10/10/23: She was doing better but recently has been doing worse.  She has severe to extremely severe epigastric pain, and nausea and severe vomiting.  She is taking reglan 10 bid (only when trying to eat), zofran bid, otc med didn't help and ppi bid.  She has lost 18lb  since her last visit here.  She has also been lightheaded and has seen neurology and ruled out for seizures.     Interval history 4/29/25:  She has severe symptoms and has had to miss work a few times in the last year.  She has vomiting 1 to several times a week (moderate), nausea 3-4 times a week (severe), and pain constant (severe).  Her bloating is extremely severe.  She is stopped reglan (didn't help), taking dicyclomine tid, using scopolamine patches, stopped zofran (stopped helping), ppi bid, and pepcid daily.  She occasionally burps with regurgitation.  She eats once a day, usually just dinner so that her symptoms don't worsen at work.  She is on motegrity for constipation which does keep her regulated.  She was 137lb last time I saw her and around 130lb now.  She generally stays around the same weight but does lose weight during the week and gains it on the weekend.  Her peg and j tubes are removed.    Interval history 5/6/25:  She was seen once in the ER for exacerbation of symptoms since I last saw her and she was given toredol.  In general she has not been doing well since and has been in extreme pain.  This is more than normal.  Her main issue is pain (upper left abdomen radiating to the back), stomach tightness from swelling.  She has associated nausea.  There were no inciting events and she wasn't eating much at the time.  She has some constipation and tried an enema (fleets) and after bm she had no significant relief.      shows one narcotics rx in 2/2022 but none since.       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      shows 1/2025 narcotics filled      Preop studies  Ugi with small bowel 2022 reviewed, films viewed    -Delayed gastric emptying consistent with the clinical diagnosis of gastro paresis.    -Normal appearing small bowel.    -Questionable area in the cervical esophagus.  Would consider endoscopy.  Ct 2022 reviewed, films viewed    -No  abnormality.    -Stomach appears ok  Ct 2023 (for peg tube site pain), reviewed, films viewed    1. Peg tube in the stomach, no abscess and appears appropriately placed to me    2. Previous cholecystectomy    3. There is a catheter in the left abdomen question if this is a drainage catheter    4. No bowel obstruction  Ges 2022 reviewed    -At 241 minutes, gastric emptying was noted to be 61%, c/w gastroparesis  Egd 2020 reviewed    - Normal esophagus.     - Gastritis. Biopsied.     - Normal examined duodenum.     - Biopsies were taken with a cold forceps for         histology in the second portion of the duodenum.   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  Severe gerd and outside of current pain likely a good candidate for robotic fundo.  Constipation.  New bout of severe luq pain with bloating.  I suggest ct with contrast to see if there is a new issue.  Obtain sitzmarker test for constipation when she is fealing better, also at the same time start xifaxin.

## 2025-05-09 ENCOUNTER — PATIENT MESSAGE (OUTPATIENT)
Dept: SURGERY | Facility: CLINIC | Age: 30
End: 2025-05-09
Payer: MEDICAID

## 2025-05-12 ENCOUNTER — DOCUMENTATION ONLY (OUTPATIENT)
Dept: SURGERY | Facility: CLINIC | Age: 30
End: 2025-05-12
Payer: MEDICAID

## 2025-05-12 DIAGNOSIS — K21.9 GASTROESOPHAGEAL REFLUX DISEASE, UNSPECIFIED WHETHER ESOPHAGITIS PRESENT: ICD-10-CM

## 2025-05-12 RX ORDER — FAMOTIDINE 20 MG/1
20 TABLET, FILM COATED ORAL 2 TIMES DAILY PRN
Qty: 60 TABLET | Refills: 11 | Status: SHIPPED | OUTPATIENT
Start: 2025-05-12 | End: 2026-05-12

## 2025-05-12 NOTE — PROGRESS NOTES
CT has been denied by insurance.  I thought this was necessary as her symptoms are not as good as I expect and there may be a bowel obstruction of some sort causing this.  We will try to get this approved.

## 2025-05-12 NOTE — TELEPHONE ENCOUNTER
[facility]  Famotidine (Pepcid) 20mg

## 2025-05-22 ENCOUNTER — PATIENT MESSAGE (OUTPATIENT)
Dept: SURGERY | Facility: CLINIC | Age: 30
End: 2025-05-22
Payer: MEDICAID

## 2025-06-03 ENCOUNTER — RESULTS FOLLOW-UP (OUTPATIENT)
Dept: SURGERY | Facility: CLINIC | Age: 30
End: 2025-06-03
Payer: MEDICAID

## 2025-07-10 ENCOUNTER — DOCUMENTATION ONLY (OUTPATIENT)
Dept: SURGERY | Facility: CLINIC | Age: 30
End: 2025-07-10
Payer: MEDICAID

## 2025-07-10 NOTE — PROGRESS NOTES
Swallowing Disorders Conference 7/8/25      We discussed her symptoms and reviewed ges 2023, 2025, egd 2023, 2025 motility 2023, 2025, ugisbf 2025, ct 2025.  We looked at the films and images.    Her symptoms are  likely mostly due to gerd.  We concluded that she likely would benefit from hiatal hernia with toupet.  We cannot guarantee this would be an excellent result and she could still have symptoms, especially bloating or worsen symptoms but think the risk is worth trying.  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

## 2025-07-11 ENCOUNTER — TELEPHONE (OUTPATIENT)
Dept: SURGERY | Facility: CLINIC | Age: 30
End: 2025-07-11
Payer: MEDICAID

## 2025-07-11 NOTE — TELEPHONE ENCOUNTER
----- Message from Kelvin Durán MD sent at 7/10/2025  6:53 AM CDT -----  Please bring her in to discuss possible robotic hh toupet.  That is the result of swallowing conference.

## 2025-07-11 NOTE — TELEPHONE ENCOUNTER
Spoke with patient  She would like virtual visit  Appointment offered and accepted for  VV on July 15 2025 at 5:45 pm  Pt verbalized understanding to all and has no further questions at this time.

## 2025-07-11 NOTE — TELEPHONE ENCOUNTER
Copied from CRM #[id number]. Topic: General Inquiry - Patient Advice  >> Jul 11, 2025  3:53 PM Lori wrote:  Type:  Needs Medical Advice    Who Called: Arleen called in regards to getting an appt for surgery   Would the patient rather a call back or a response via [facility]? Call back   Best Call Back Number: pt [phone]   Additional Information:

## 2025-07-15 ENCOUNTER — OFFICE VISIT (OUTPATIENT)
Dept: SURGERY | Facility: CLINIC | Age: 30
End: 2025-07-15
Payer: MEDICAID

## 2025-07-15 DIAGNOSIS — K31.84 GASTROPARESIS: Primary | ICD-10-CM

## 2025-07-15 PROCEDURE — 98006 SYNCH AUDIO-VIDEO EST MOD 30: CPT | Mod: 95,,, | Performed by: SURGERY

## 2025-07-15 PROCEDURE — 1160F RVW MEDS BY RX/DR IN RCRD: CPT | Mod: CPTII,95,, | Performed by: SURGERY

## 2025-07-15 PROCEDURE — 1159F MED LIST DOCD IN RCRD: CPT | Mod: CPTII,95,, | Performed by: SURGERY

## 2025-07-15 NOTE — LETTER
July 15, 2025        Eusebio Escamilla MD  [address]  [place] [address]             [facility]  [address]  [facility] [address]  Phone: [phone]   Patient: Tess Rodríguez   MR Number: [id number]   YOB: 1995   Date of Visit: 7/15/2025       Dear Dr. Escamilla:    Thank you for referring Tess Rodríguez to me for evaluation. Attached you will find relevant portions of my assessment and plan of care.    If you have questions, please do not hesitate to call me. I look forward to following Tess Rodríguez along with you.    Sincerely,      Kelvin Durán MD            CC  No Recipients    Enclosure

## 2025-07-15 NOTE — PROGRESS NOTES
The patient location is: [place]  The chief complaint leading to consultation is: gerd and gastroparesis    Visit type: audiovisual      18 minutes of total time spent on the encounter, which includes face to face time and non-face to face time preparing to see the patient (eg, review of tests), Obtaining and/or reviewing separately obtained history, Documenting clinical information in the electronic or other health record, Independently interpreting results (not separately reported) and communicating results to the patient/family/caregiver, or Care coordination (not separately reported).         Each patient to whom he or she provides medical services by telemedicine is:  (1) informed of the relationship between the physician and patient and the respective role of any other health care provider with respect to management of the patient; and (2) notified that he or she may decline to receive medical services by telemedicine and may withdraw from such care at any time.    Notes:     31y/o with gerd and gastroparesis s/p peg with j tube 3/24/23 and s/p robotic pyloromyotomy 5/19/23.  She is s/p gokul.     Interval history 7/6/23: Her symptoms are slightly worse than last visit but she is eating more and gaining weight.  She has severe to extremely severe epigastric pain, moderate to extremely severe nausea and mild vomiting.  She has not been to the ER when the j tube fell out and it was in place.  She wants her j tube removed.     Interval history 8/22/23: She has severe to extremely severe epigastric pain, severe nausea and mild vomiting.  Severe but improved from preop.  She appears to have significant gerd despite medication.     Interval history 10/10/23: She was doing better but recently has been doing worse.  She has severe to extremely severe epigastric pain, and nausea and severe vomiting.  She is taking reglan 10 bid (only when trying to eat), zofran bid, otc med didn't help and ppi bid.  She has lost 18lb  since her last visit here.  She has also been lightheaded and has seen neurology and ruled out for seizures.     Interval history 4/29/25:  She has severe symptoms and has had to miss work a few times in the last year.  She has vomiting 1 to several times a week (moderate), nausea 3-4 times a week (severe), and pain constant (severe).  Her bloating is extremely severe.  She is stopped reglan (didn't help), taking dicyclomine tid, using scopolamine patches, stopped zofran (stopped helping), ppi bid, and pepcid daily.  She occasionally burps with regurgitation.  She eats once a day, usually just dinner so that her symptoms don't worsen at work.  She is on motegrity for constipation which does keep her regulated.  She was 137lb last time I saw her and around 130lb now.  She generally stays around the same weight but does lose weight during the week and gains it on the weekend.  Her peg and j tubes are removed.     Interval history 5/6/25:  She was seen once in the ER for exacerbation of symptoms since I last saw her and she was given toredol.  In general she has not been doing well since and has been in extreme pain.  This is more than normal.  Her main issue is pain (upper left abdomen radiating to the back), stomach tightness from swelling.  She has associated nausea.  There were no inciting events and she wasn't eating much at the time.  She has some constipation and tried an enema (fleets) and after bm she had no significant relief.    Interval history 7/15/25:  She continue about the same.  She continues in pain and this is worsening.  She continues with gerd symptoms with nausea and vomiting.  This is despite appropriate medication.  Symptoms are worse with eating and she is scared to eat.  She says she has lost weight.      shows one narcotics rx in 2/2022 but none since.       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   shows 1/2025 narcotics filled      Preop studies  Ugi  with small bowel 2022 reviewed, films viewed    -Delayed gastric emptying consistent with the clinical diagnosis of gastro paresis.    -Normal appearing small bowel.    -Questionable area in the cervical esophagus.  Would consider endoscopy.  Ct 2022 reviewed, films viewed    -No abnormality.    -Stomach appears ok  Ct 2023 (for peg tube site pain), reviewed, films viewed    1. Peg tube in the stomach, no abscess and appears appropriately placed to me    2. Previous cholecystectomy    3. There is a catheter in the left abdomen question if this is a drainage catheter    4. No bowel obstruction  Ges 2022 reviewed    -At 241 minutes, gastric emptying was noted to be 61%, c/w gastroparesis  Egd 2020 reviewed    - Normal esophagus.     - Gastritis. Biopsied.     - Normal examined duodenum.     - Biopsies were taken with a cold forceps for         histology in the second portion of the duodenum.   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  We looked at the films and images.     Her symptoms are  likely mostly due to gerd.  We concluded that she likely would benefit from hiatal hernia with toupet.  We cannot guarantee this would be an excellent result and she could still have symptoms, especially bloating or worsen symptoms but think the risk is worth trying.  We feel that her jackhammer esophagus is likely not part of her symptom  complex.        Assessment and plan  S/p pyloric procedure for gastroparesis with significant improvement in symptoms but still with significant symptoms despite near normalization of ges.  Severe gerd for robotic hh with toupet.  Keep on ppi postop for gastritis.  Pcp clearance.  She understands that this is likely to help but could make her symptoms worsen.  Constipation.  Obtain sitzmarker test for constipation when she is fealing better, also at the same time start xifaxin (she can follow up with her pcp or GI on this).

## 2025-07-18 ENCOUNTER — TELEPHONE (OUTPATIENT)
Dept: SURGERY | Facility: CLINIC | Age: 30
End: 2025-07-18
Payer: MEDICAID

## 2025-07-18 DIAGNOSIS — K21.9 GASTROESOPHAGEAL REFLUX DISEASE, UNSPECIFIED WHETHER ESOPHAGITIS PRESENT: Primary | ICD-10-CM

## 2025-07-18 NOTE — TELEPHONE ENCOUNTER
Returned pt phone and discussed surgery dates.  Pt was requesting 7/25, but no OR block time on that date.  She requested the next available Friday would is 8/8-pt ok with date.    Preop and post op information to be sent through portal.   She has no further questions or concerns at this time.

## 2025-07-18 NOTE — TELEPHONE ENCOUNTER
Copied from CRM #[id number]. Topic: Appointments - Appointment Access  >> Jul 18, 2025 12:45 PM Savanna wrote:  Type:  Schedule procedure     Caller is calling to schedule procedure for hernia repair    Name of Caller:Tess     When is the first available appointment?No available appts       Best Call Back Number:[phone]

## 2025-08-07 ENCOUNTER — ANESTHESIA EVENT (OUTPATIENT)
Dept: SURGERY | Facility: HOSPITAL | Age: 30
End: 2025-08-07
Payer: MEDICAID

## 2025-08-07 ENCOUNTER — TELEPHONE (OUTPATIENT)
Dept: SURGERY | Facility: CLINIC | Age: 30
End: 2025-08-07
Payer: MEDICAID

## 2025-08-07 RX ORDER — CARIPRAZINE 4.5 MG/1
4.5 CAPSULE, GELATIN COATED ORAL DAILY
COMMUNITY
Start: 2025-06-25

## 2025-08-07 RX ORDER — DEXTROAMPHETAMINE SACCHARATE, AMPHETAMINE ASPARTATE, DEXTROAMPHETAMINE SULFATE AND AMPHETAMINE SULFATE 7.5; 7.5; 7.5; 7.5 MG/1; MG/1; MG/1; MG/1
1 TABLET ORAL 2 TIMES DAILY
COMMUNITY
Start: 2025-06-25

## 2025-08-07 RX ORDER — MIRTAZAPINE 15 MG/1
15 TABLET, FILM COATED ORAL NIGHTLY
COMMUNITY
Start: 2025-06-23

## 2025-08-08 ENCOUNTER — HOSPITAL ENCOUNTER (OUTPATIENT)
Facility: HOSPITAL | Age: 30
Discharge: HOME | End: 2025-08-09
Attending: SURGERY | Admitting: SURGERY
Payer: MEDICAID

## 2025-08-08 ENCOUNTER — ANESTHESIA (OUTPATIENT)
Dept: SURGERY | Facility: HOSPITAL | Age: 30
End: 2025-08-08
Payer: MEDICAID

## 2025-08-08 DIAGNOSIS — K44.9 HIATAL HERNIA: Primary | ICD-10-CM

## 2025-08-08 PROCEDURE — 63600175 PHARM REV CODE 636 W HCPCS

## 2025-08-08 PROCEDURE — 25000003 PHARM REV CODE 250

## 2025-08-08 PROCEDURE — 43280 LAPAROSCOPY FUNDOPLASTY: CPT | Mod: ,,, | Performed by: SURGERY

## 2025-08-08 PROCEDURE — 36000712 HC OR TIME LEV V 1ST 15 MIN: Performed by: SURGERY

## 2025-08-08 PROCEDURE — 76942 ECHO GUIDE FOR BIOPSY: CPT

## 2025-08-08 PROCEDURE — 37000009 HC ANESTHESIA EA ADD 15 MINS: Performed by: SURGERY

## 2025-08-08 PROCEDURE — 63600175 PHARM REV CODE 636 W HCPCS: Performed by: ANESTHESIOLOGY

## 2025-08-08 PROCEDURE — 71000015 HC POSTOP RECOV 1ST HR: Performed by: SURGERY

## 2025-08-08 PROCEDURE — 36000713 HC OR TIME LEV V EA ADD 15 MIN: Performed by: SURGERY

## 2025-08-08 PROCEDURE — 64468 THRC FASCIAL PLN BLK BI NJX: CPT | Mod: XU,,, | Performed by: ANESTHESIOLOGY

## 2025-08-08 PROCEDURE — 71000033 HC RECOVERY, INTIAL HOUR: Performed by: SURGERY

## 2025-08-08 PROCEDURE — 71000016 HC POSTOP RECOV ADDL HR: Performed by: SURGERY

## 2025-08-08 PROCEDURE — 94761 N-INVAS EAR/PLS OXIMETRY MLT: CPT

## 2025-08-08 PROCEDURE — 37000008 HC ANESTHESIA 1ST 15 MINUTES: Performed by: SURGERY

## 2025-08-08 RX ORDER — KETOROLAC TROMETHAMINE 30 MG/ML
INJECTION, SOLUTION INTRAMUSCULAR; INTRAVENOUS
Status: DISPENSED
Start: 2025-08-08 | End: 2025-08-09

## 2025-08-08 RX ORDER — ONDANSETRON HYDROCHLORIDE 2 MG/ML
8 INJECTION, SOLUTION INTRAVENOUS EVERY 8 HOURS
Status: DISCONTINUED | OUTPATIENT
Start: 2025-08-08 | End: 2025-08-09 | Stop reason: HOSPADM

## 2025-08-08 RX ORDER — SODIUM CHLORIDE 0.9 % (FLUSH) 0.9 %
2 SYRINGE (ML) INJECTION EVERY 6 HOURS PRN
Status: DISCONTINUED | OUTPATIENT
Start: 2025-08-08 | End: 2025-08-08 | Stop reason: HOSPADM

## 2025-08-08 RX ORDER — KETAMINE HCL IN 0.9 % NACL 50 MG/5 ML
SYRINGE (ML) INTRAVENOUS
Status: DISCONTINUED | OUTPATIENT
Start: 2025-08-08 | End: 2025-08-08

## 2025-08-08 RX ORDER — DEXMEDETOMIDINE HYDROCHLORIDE 100 UG/ML
INJECTION, SOLUTION INTRAVENOUS
Status: DISCONTINUED | OUTPATIENT
Start: 2025-08-08 | End: 2025-08-08

## 2025-08-08 RX ORDER — BUPIVACAINE HYDROCHLORIDE 7.5 MG/ML
INJECTION, SOLUTION EPIDURAL; RETROBULBAR
Status: COMPLETED | OUTPATIENT
Start: 2025-08-08 | End: 2025-08-08

## 2025-08-08 RX ORDER — SODIUM CHLORIDE, SODIUM LACTATE, POTASSIUM CHLORIDE, CALCIUM CHLORIDE 600; 310; 30; 20 MG/100ML; MG/100ML; MG/100ML; MG/100ML
INJECTION, SOLUTION INTRAVENOUS CONTINUOUS
Status: DISCONTINUED | OUTPATIENT
Start: 2025-08-08 | End: 2025-08-09 | Stop reason: HOSPADM

## 2025-08-08 RX ORDER — CLONIDINE 100 UG/ML
INJECTION, SOLUTION EPIDURAL
Status: COMPLETED | OUTPATIENT
Start: 2025-08-08 | End: 2025-08-08

## 2025-08-08 RX ORDER — LIDOCAINE HYDROCHLORIDE 20 MG/ML
INJECTION, SOLUTION EPIDURAL; INFILTRATION; INTRACAUDAL; PERINEURAL
Status: DISCONTINUED | OUTPATIENT
Start: 2025-08-08 | End: 2025-08-08

## 2025-08-08 RX ORDER — SODIUM CHLORIDE 0.9 % (FLUSH) 0.9 %
10 SYRINGE (ML) INJECTION
Status: DISCONTINUED | OUTPATIENT
Start: 2025-08-08 | End: 2025-08-08 | Stop reason: HOSPADM

## 2025-08-08 RX ORDER — ROCURONIUM BROMIDE 10 MG/ML
INJECTION, SOLUTION INTRAVENOUS
Status: DISCONTINUED | OUTPATIENT
Start: 2025-08-08 | End: 2025-08-08

## 2025-08-08 RX ORDER — HALOPERIDOL LACTATE 5 MG/ML
0.5 INJECTION, SOLUTION INTRAMUSCULAR EVERY 10 MIN PRN
Status: DISCONTINUED | OUTPATIENT
Start: 2025-08-08 | End: 2025-08-08 | Stop reason: HOSPADM

## 2025-08-08 RX ORDER — DEXAMETHASONE SODIUM PHOSPHATE 4 MG/ML
INJECTION, SOLUTION INTRA-ARTICULAR; INTRALESIONAL; INTRAMUSCULAR; INTRAVENOUS; SOFT TISSUE
Status: DISCONTINUED | OUTPATIENT
Start: 2025-08-08 | End: 2025-08-08

## 2025-08-08 RX ORDER — SODIUM CHLORIDE, SODIUM LACTATE, POTASSIUM CHLORIDE, CALCIUM CHLORIDE 600; 310; 30; 20 MG/100ML; MG/100ML; MG/100ML; MG/100ML
INJECTION, SOLUTION INTRAVENOUS CONTINUOUS
Status: DISCONTINUED | OUTPATIENT
Start: 2025-08-08 | End: 2025-08-08

## 2025-08-08 RX ORDER — DEXAMETHASONE SODIUM PHOSPHATE 10 MG/ML
INJECTION, SOLUTION INTRA-ARTICULAR; INTRALESIONAL; INTRAMUSCULAR; INTRAVENOUS; SOFT TISSUE
Status: COMPLETED | OUTPATIENT
Start: 2025-08-08 | End: 2025-08-08

## 2025-08-08 RX ORDER — SCOPOLAMINE 1 MG/3D
1 PATCH, EXTENDED RELEASE TRANSDERMAL
Status: DISCONTINUED | OUTPATIENT
Start: 2025-08-08 | End: 2025-08-09 | Stop reason: HOSPADM

## 2025-08-08 RX ORDER — METHOCARBAMOL 100 MG/ML
1000 INJECTION, SOLUTION INTRAMUSCULAR; INTRAVENOUS EVERY 8 HOURS
Status: DISCONTINUED | OUTPATIENT
Start: 2025-08-08 | End: 2025-08-08

## 2025-08-08 RX ORDER — MIDAZOLAM HYDROCHLORIDE 1 MG/ML
.5-4 INJECTION, SOLUTION INTRAMUSCULAR; INTRAVENOUS
Status: DISCONTINUED | OUTPATIENT
Start: 2025-08-08 | End: 2025-08-08 | Stop reason: HOSPADM

## 2025-08-08 RX ORDER — LIDOCAINE HYDROCHLORIDE 10 MG/ML
1 INJECTION, SOLUTION EPIDURAL; INFILTRATION; INTRACAUDAL; PERINEURAL ONCE
Status: DISCONTINUED | OUTPATIENT
Start: 2025-08-09 | End: 2025-08-08 | Stop reason: HOSPADM

## 2025-08-08 RX ORDER — ACETAMINOPHEN 650 MG/20.3ML
1000 LIQUID ORAL EVERY 8 HOURS
Status: DISCONTINUED | OUTPATIENT
Start: 2025-08-08 | End: 2025-08-08

## 2025-08-08 RX ORDER — KETOROLAC TROMETHAMINE 15 MG/ML
15 INJECTION, SOLUTION INTRAMUSCULAR; INTRAVENOUS EVERY 6 HOURS PRN
Status: DISCONTINUED | OUTPATIENT
Start: 2025-08-08 | End: 2025-08-09 | Stop reason: HOSPADM

## 2025-08-08 RX ORDER — PROCHLORPERAZINE EDISYLATE 5 MG/ML
2.5 INJECTION INTRAMUSCULAR; INTRAVENOUS EVERY 6 HOURS PRN
Status: DISCONTINUED | OUTPATIENT
Start: 2025-08-08 | End: 2025-08-09 | Stop reason: HOSPADM

## 2025-08-08 RX ORDER — ONDANSETRON 8 MG/1
8 TABLET, ORALLY DISINTEGRATING ORAL EVERY 8 HOURS PRN
Status: DISCONTINUED | OUTPATIENT
Start: 2025-08-09 | End: 2025-08-08

## 2025-08-08 RX ORDER — PROCHLORPERAZINE EDISYLATE 5 MG/ML
5 INJECTION INTRAMUSCULAR; INTRAVENOUS EVERY 6 HOURS PRN
Status: DISCONTINUED | OUTPATIENT
Start: 2025-08-09 | End: 2025-08-08

## 2025-08-08 RX ORDER — ONDANSETRON HYDROCHLORIDE 2 MG/ML
INJECTION, SOLUTION INTRAVENOUS
Status: DISCONTINUED | OUTPATIENT
Start: 2025-08-08 | End: 2025-08-08

## 2025-08-08 RX ORDER — METHOCARBAMOL 100 MG/ML
500 INJECTION, SOLUTION INTRAMUSCULAR; INTRAVENOUS EVERY 8 HOURS
Status: DISCONTINUED | OUTPATIENT
Start: 2025-08-08 | End: 2025-08-09 | Stop reason: HOSPADM

## 2025-08-08 RX ORDER — ONDANSETRON HYDROCHLORIDE 2 MG/ML
8 INJECTION, SOLUTION INTRAVENOUS EVERY 8 HOURS
Status: DISCONTINUED | OUTPATIENT
Start: 2025-08-08 | End: 2025-08-08

## 2025-08-08 RX ORDER — GLUCAGON 1 MG
1 KIT INJECTION
Status: DISCONTINUED | OUTPATIENT
Start: 2025-08-08 | End: 2025-08-08 | Stop reason: HOSPADM

## 2025-08-08 RX ORDER — KETOROLAC TROMETHAMINE 15 MG/ML
15 INJECTION, SOLUTION INTRAMUSCULAR; INTRAVENOUS ONCE
Status: COMPLETED | OUTPATIENT
Start: 2025-08-08 | End: 2025-08-08

## 2025-08-08 RX ORDER — SODIUM CHLORIDE 9 MG/ML
INJECTION, SOLUTION INTRAVENOUS CONTINUOUS
Status: DISCONTINUED | OUTPATIENT
Start: 2025-08-09 | End: 2025-08-08

## 2025-08-08 RX ORDER — ACETAMINOPHEN 650 MG/20.3ML
1000 LIQUID ORAL EVERY 8 HOURS
Status: DISCONTINUED | OUTPATIENT
Start: 2025-08-08 | End: 2025-08-09 | Stop reason: HOSPADM

## 2025-08-08 RX ORDER — PANTOPRAZOLE SODIUM 40 MG/10ML
40 INJECTION, POWDER, LYOPHILIZED, FOR SOLUTION INTRAVENOUS DAILY
Status: DISCONTINUED | OUTPATIENT
Start: 2025-08-09 | End: 2025-08-09 | Stop reason: HOSPADM

## 2025-08-08 RX ORDER — CEFAZOLIN 2 G/1
2 INJECTION, POWDER, FOR SOLUTION INTRAMUSCULAR; INTRAVENOUS
Status: DISCONTINUED | OUTPATIENT
Start: 2025-08-08 | End: 2025-08-08 | Stop reason: HOSPADM

## 2025-08-08 RX ORDER — CEFAZOLIN SODIUM 1 G/3ML
INJECTION, POWDER, FOR SOLUTION INTRAMUSCULAR; INTRAVENOUS
Status: DISCONTINUED | OUTPATIENT
Start: 2025-08-08 | End: 2025-08-08

## 2025-08-08 RX ORDER — ACETAMINOPHEN 500 MG
1000 TABLET ORAL
Status: COMPLETED | OUTPATIENT
Start: 2025-08-08 | End: 2025-08-08

## 2025-08-08 RX ORDER — GABAPENTIN 250 MG/5ML
125 SOLUTION ORAL EVERY 8 HOURS
Status: DISCONTINUED | OUTPATIENT
Start: 2025-08-08 | End: 2025-08-09 | Stop reason: HOSPADM

## 2025-08-08 RX ORDER — PROPOFOL 10 MG/ML
VIAL (ML) INTRAVENOUS
Status: DISCONTINUED | OUTPATIENT
Start: 2025-08-08 | End: 2025-08-08

## 2025-08-08 RX ORDER — ENOXAPARIN SODIUM 100 MG/ML
40 INJECTION SUBCUTANEOUS EVERY 24 HOURS
Status: DISCONTINUED | OUTPATIENT
Start: 2025-08-08 | End: 2025-08-09 | Stop reason: HOSPADM

## 2025-08-08 RX ADMIN — MIDAZOLAM 2 MG: 1 INJECTION INTRAMUSCULAR; INTRAVENOUS at 12:08

## 2025-08-08 RX ADMIN — DEXMEDETOMIDINE 2 MCG: 200 INJECTION, SOLUTION INTRAVENOUS at 02:08

## 2025-08-08 RX ADMIN — METHOCARBAMOL 1000 MG: 100 INJECTION INTRAMUSCULAR; INTRAVENOUS at 03:08

## 2025-08-08 RX ADMIN — GABAPENTIN 125 MG: 250 SOLUTION ORAL at 04:08

## 2025-08-08 RX ADMIN — DEXAMETHASONE SODIUM PHOSPHATE 2 MG: 10 INJECTION, SOLUTION INTRAMUSCULAR; INTRAVENOUS at 12:08

## 2025-08-08 RX ADMIN — SODIUM CHLORIDE: 9 INJECTION, SOLUTION INTRAVENOUS at 12:08

## 2025-08-08 RX ADMIN — ROCURONIUM BROMIDE 20 MG: 10 INJECTION, SOLUTION INTRAVENOUS at 01:08

## 2025-08-08 RX ADMIN — METHOCARBAMOL 500 MG: 100 INJECTION INTRAMUSCULAR; INTRAVENOUS at 10:08

## 2025-08-08 RX ADMIN — SODIUM CHLORIDE, POTASSIUM CHLORIDE, SODIUM LACTATE AND CALCIUM CHLORIDE: 600; 310; 30; 20 INJECTION, SOLUTION INTRAVENOUS at 03:08

## 2025-08-08 RX ADMIN — GABAPENTIN 125 MG: 250 SOLUTION ORAL at 09:08

## 2025-08-08 RX ADMIN — CLONIDINE HYDROCHLORIDE 100 MCG: 100 INJECTION, SOLUTION INTRAVENOUS at 12:08

## 2025-08-08 RX ADMIN — SODIUM CHLORIDE: 9 INJECTION, SOLUTION INTRAVENOUS at 10:08

## 2025-08-08 RX ADMIN — Medication 15 MG: at 01:08

## 2025-08-08 RX ADMIN — SODIUM CHLORIDE, SODIUM GLUCONATE, SODIUM ACETATE, POTASSIUM CHLORIDE, MAGNESIUM CHLORIDE, SODIUM PHOSPHATE, DIBASIC, AND POTASSIUM PHOSPHATE: .53; .5; .37; .037; .03; .012; .00082 INJECTION, SOLUTION INTRAVENOUS at 12:08

## 2025-08-08 RX ADMIN — ONDANSETRON 4 MG: 2 INJECTION INTRAMUSCULAR; INTRAVENOUS at 02:08

## 2025-08-08 RX ADMIN — LIDOCAINE HYDROCHLORIDE 40 MG: 20 INJECTION, SOLUTION EPIDURAL; INFILTRATION; INTRACAUDAL at 12:08

## 2025-08-08 RX ADMIN — ROCURONIUM BROMIDE 60 MG: 10 INJECTION, SOLUTION INTRAVENOUS at 12:08

## 2025-08-08 RX ADMIN — KETOROLAC TROMETHAMINE 15 MG: 15 INJECTION, SOLUTION INTRAMUSCULAR; INTRAVENOUS at 03:08

## 2025-08-08 RX ADMIN — Medication 10 MG: at 02:08

## 2025-08-08 RX ADMIN — HALOPERIDOL LACTATE 0.5 MG: 5 INJECTION, SOLUTION INTRAMUSCULAR at 04:08

## 2025-08-08 RX ADMIN — DEXMEDETOMIDINE 4 MCG: 200 INJECTION, SOLUTION INTRAVENOUS at 01:08

## 2025-08-08 RX ADMIN — PROPOFOL 200 MG: 10 INJECTION, EMULSION INTRAVENOUS at 12:08

## 2025-08-08 RX ADMIN — ACETAMINOPHEN 1000 MG: 500 TABLET ORAL at 10:08

## 2025-08-08 RX ADMIN — ACETAMINOPHEN 999.01 MG: 650 SOLUTION ORAL at 09:08

## 2025-08-08 RX ADMIN — BUPIVACAINE HYDROCHLORIDE 30 ML: 7.5 INJECTION, SOLUTION EPIDURAL; RETROBULBAR at 12:08

## 2025-08-08 RX ADMIN — SCOPALAMINE 1 PATCH: 1 PATCH, EXTENDED RELEASE TRANSDERMAL at 04:08

## 2025-08-08 RX ADMIN — CEFAZOLIN 2 G: 330 INJECTION, POWDER, FOR SOLUTION INTRAMUSCULAR; INTRAVENOUS at 12:08

## 2025-08-08 RX ADMIN — ENOXAPARIN SODIUM 40 MG: 40 INJECTION SUBCUTANEOUS at 05:08

## 2025-08-08 RX ADMIN — ACETAMINOPHEN 999.01 MG: 650 SOLUTION ORAL at 04:08

## 2025-08-08 RX ADMIN — SUGAMMADEX 200 MG: 100 INJECTION, SOLUTION INTRAVENOUS at 02:08

## 2025-08-08 RX ADMIN — ONDANSETRON 8 MG: 2 INJECTION INTRAMUSCULAR; INTRAVENOUS at 10:08

## 2025-08-08 RX ADMIN — DEXAMETHASONE SODIUM PHOSPHATE 4 MG: 4 INJECTION INTRA-ARTICULAR; INTRALESIONAL; INTRAMUSCULAR; INTRAVENOUS; SOFT TISSUE at 12:08

## 2025-08-09 VITALS
SYSTOLIC BLOOD PRESSURE: 104 MMHG | RESPIRATION RATE: 18 BRPM | BODY MASS INDEX: 21.93 KG/M2 | HEIGHT: 65 IN | WEIGHT: 131.63 LBS | OXYGEN SATURATION: 98 % | HEART RATE: 69 BPM | TEMPERATURE: 98 F | DIASTOLIC BLOOD PRESSURE: 66 MMHG

## 2025-08-09 PROBLEM — K44.9 HIATAL HERNIA: Status: ACTIVE | Noted: 2025-08-09

## 2025-08-09 LAB
ANION GAP (OHS): 9 MMOL/L (ref 8–16)
BUN SERPL-MCNC: 9 MG/DL (ref 6–20)
CALCIUM SERPL-MCNC: 8.2 MG/DL (ref 8.7–10.5)
CHLORIDE SERPL-SCNC: 109 MMOL/L (ref 95–110)
CO2 SERPL-SCNC: 21 MMOL/L (ref 23–29)
CREAT SERPL-MCNC: 0.8 MG/DL (ref 0.5–1.4)
ERYTHROCYTE [DISTWIDTH] IN BLOOD BY AUTOMATED COUNT: 12.2 % (ref 11.5–14.5)
GFR SERPLBLD CREATININE-BSD FMLA CKD-EPI: >60 ML/MIN/1.73/M2
GLUCOSE SERPL-MCNC: 96 MG/DL (ref 70–110)
HCT VFR BLD AUTO: 36.3 % (ref 37–48.5)
HGB BLD-MCNC: 12.5 GM/DL (ref 12–16)
MAGNESIUM SERPL-MCNC: 1.9 MG/DL (ref 1.6–2.6)
MCH RBC QN AUTO: 32.1 PG (ref 27–31)
MCHC RBC AUTO-ENTMCNC: 34.4 G/DL (ref 32–36)
MCV RBC AUTO: 93 FL (ref 82–98)
PHOSPHATE SERPL-MCNC: 3.9 MG/DL (ref 2.7–4.5)
PLATELET # BLD AUTO: 173 K/UL (ref 150–450)
PMV BLD AUTO: 10.9 FL (ref 9.2–12.9)
POTASSIUM SERPL-SCNC: 4.1 MMOL/L (ref 3.5–5.1)
RBC # BLD AUTO: 3.9 M/UL (ref 4–5.4)
SODIUM SERPL-SCNC: 139 MMOL/L (ref 136–145)
WBC # BLD AUTO: 10.35 K/UL (ref 3.9–12.7)

## 2025-08-09 PROCEDURE — 25000003 PHARM REV CODE 250

## 2025-08-09 PROCEDURE — 63600175 PHARM REV CODE 636 W HCPCS: Mod: JZ,TB

## 2025-08-09 PROCEDURE — 36415 COLL VENOUS BLD VENIPUNCTURE: CPT

## 2025-08-09 RX ORDER — ONDANSETRON 4 MG/1
8 TABLET, ORALLY DISINTEGRATING ORAL EVERY 6 HOURS PRN
Qty: 30 TABLET | Refills: 1 | Status: SHIPPED | OUTPATIENT
Start: 2025-08-09

## 2025-08-09 RX ORDER — KETOROLAC TROMETHAMINE 10 MG/1
10 TABLET, FILM COATED ORAL EVERY 6 HOURS
Qty: 20 TABLET | Refills: 0 | Status: SHIPPED | OUTPATIENT
Start: 2025-08-09 | End: 2025-08-14

## 2025-08-09 RX ORDER — DEXTROMETHORPHAN HYDROBROMIDE, GUAIFENESIN 5; 100 MG/5ML; MG/5ML
650 LIQUID ORAL EVERY 8 HOURS
COMMUNITY
Start: 2025-08-09 | End: 2025-08-16

## 2025-08-09 RX ADMIN — GABAPENTIN 125 MG: 250 SOLUTION ORAL at 05:08

## 2025-08-09 RX ADMIN — PANTOPRAZOLE SODIUM 40 MG: 40 INJECTION, POWDER, FOR SOLUTION INTRAVENOUS at 07:08

## 2025-08-09 RX ADMIN — ONDANSETRON 8 MG: 2 INJECTION INTRAMUSCULAR; INTRAVENOUS at 06:08

## 2025-08-09 RX ADMIN — METHOCARBAMOL 500 MG: 100 INJECTION INTRAMUSCULAR; INTRAVENOUS at 06:08

## 2025-08-09 RX ADMIN — ACETAMINOPHEN 999.01 MG: 650 SOLUTION ORAL at 05:08

## 2025-08-09 RX ADMIN — KETOROLAC TROMETHAMINE 15 MG: 15 INJECTION INTRAMUSCULAR at 07:08

## 2025-08-21 ENCOUNTER — OFFICE VISIT (OUTPATIENT)
Dept: SURGERY | Facility: CLINIC | Age: 30
End: 2025-08-21
Payer: MEDICAID

## 2025-08-21 VITALS
WEIGHT: 127.13 LBS | BODY MASS INDEX: 21.18 KG/M2 | HEIGHT: 65 IN | SYSTOLIC BLOOD PRESSURE: 122 MMHG | HEART RATE: 99 BPM | DIASTOLIC BLOOD PRESSURE: 81 MMHG

## 2025-08-21 DIAGNOSIS — R13.10 DYSPHAGIA, UNSPECIFIED TYPE: ICD-10-CM

## 2025-08-21 DIAGNOSIS — Z09 POSTOP CHECK: Primary | ICD-10-CM

## 2025-08-21 PROBLEM — K44.9 HIATAL HERNIA: Status: RESOLVED | Noted: 2025-08-09 | Resolved: 2025-08-21

## 2025-08-21 PROCEDURE — 99999 PR PBB SHADOW E&M-EST. PATIENT-LVL III: CPT | Mod: PBBFAC,,, | Performed by: SURGERY

## 2025-08-21 PROCEDURE — 1159F MED LIST DOCD IN RCRD: CPT | Mod: CPTII,,, | Performed by: SURGERY

## 2025-08-21 PROCEDURE — 3079F DIAST BP 80-89 MM HG: CPT | Mod: CPTII,,, | Performed by: SURGERY

## 2025-08-21 PROCEDURE — 99213 OFFICE O/P EST LOW 20 MIN: CPT | Mod: PBBFAC | Performed by: SURGERY

## 2025-08-21 PROCEDURE — 1160F RVW MEDS BY RX/DR IN RCRD: CPT | Mod: CPTII,,, | Performed by: SURGERY

## 2025-08-21 PROCEDURE — 3074F SYST BP LT 130 MM HG: CPT | Mod: CPTII,,, | Performed by: SURGERY

## 2025-08-21 PROCEDURE — 99024 POSTOP FOLLOW-UP VISIT: CPT | Mod: ,,, | Performed by: SURGERY

## (undated) DEVICE — SEAL UNIVERSAL 5MM-8MM XI

## (undated) DEVICE — TRAY MINOR GEN SURG OMC

## (undated) DEVICE — TUBE SET SINGLE LUMEN FILTERED

## (undated) DEVICE — ELECTRODE REM PLYHSV RETURN 9

## (undated) DEVICE — NDL HYPO REG 25G X 1 1/2

## (undated) DEVICE — Device

## (undated) DEVICE — OBTURATOR BLADELESS 8MM XI

## (undated) DEVICE — ELECTRODE MEGADYNE RETURN DUAL

## (undated) DEVICE — DRAPE COLUMN DAVINCI XI

## (undated) DEVICE — DRAPE SCOPE PILLOW WARMER

## (undated) DEVICE — R TROCAR ENDOPTH XCL 5MM 7.5CM

## (undated) DEVICE — SOL NS 1000CC

## (undated) DEVICE — ADHESIVE DERMABOND ADVANCED

## (undated) DEVICE — GOWN SURGICAL X-LARGE

## (undated) DEVICE — DRAPE ARM DAVINCI XI

## (undated) DEVICE — SUT BLU GS-22 0 3.5M 23CM 9IN

## (undated) DEVICE — SUT GUT PL. 4-0 27 FS-2

## (undated) DEVICE — COVER LIGHT HANDLE

## (undated) DEVICE — GOWN POLY REINF BRTH SLV XL

## (undated) DEVICE — SUT VLOC 2-0 6IN 1/2 CIRCLE TP

## (undated) DEVICE — OBTURATOR BLADELESS 8MM XI CLR

## (undated) DEVICE — KIT ANTIFOG W/SPONG & FLUID

## (undated) DEVICE — BLADE SURG CARBON STEEL SZ11

## (undated) DEVICE — SUT VICRYL+ 27 UR-6 VIOL

## (undated) DEVICE — TROCAR ENDOPATH XCEL 5X100MM

## (undated) DEVICE — SPONGE PATTY SURGICAL .5X3IN

## (undated) DEVICE — PENCIL ROCKER SWITCH 10FT CORD

## (undated) DEVICE — DRAPE INCISE IOBAN 2 23X17IN

## (undated) DEVICE — SUT 0 VICRYL / UR6 (J603)

## (undated) DEVICE — DEVICE SYNCHROSEAL DA VINCI

## (undated) DEVICE — KIT PEG PULL STANDARD 20F

## (undated) DEVICE — SYR 10CC LUER LOCK